# Patient Record
Sex: FEMALE | Race: WHITE | NOT HISPANIC OR LATINO | Employment: FULL TIME | URBAN - NONMETROPOLITAN AREA
[De-identification: names, ages, dates, MRNs, and addresses within clinical notes are randomized per-mention and may not be internally consistent; named-entity substitution may affect disease eponyms.]

---
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
## 2020-01-26 ENCOUNTER — TRANSFERRED RECORDS (OUTPATIENT)
Dept: HEALTH INFORMATION MANAGEMENT | Facility: CLINIC | Age: 20
End: 2020-01-26

## 2020-01-27 ENCOUNTER — OFFICE VISIT (OUTPATIENT)
Dept: OBGYN | Facility: OTHER | Age: 20
End: 2020-01-27
Attending: OBSTETRICS & GYNECOLOGY
Payer: COMMERCIAL

## 2020-01-27 VITALS
BODY MASS INDEX: 35.26 KG/M2 | WEIGHT: 199 LBS | SYSTOLIC BLOOD PRESSURE: 122 MMHG | HEIGHT: 63 IN | DIASTOLIC BLOOD PRESSURE: 70 MMHG | HEART RATE: 75 BPM

## 2020-01-27 DIAGNOSIS — N83.291 COMPLEX CYST OF RIGHT OVARY: Primary | ICD-10-CM

## 2020-01-27 PROCEDURE — G0463 HOSPITAL OUTPT CLINIC VISIT: HCPCS

## 2020-01-27 PROCEDURE — 99212 OFFICE O/P EST SF 10 MIN: CPT | Performed by: OBSTETRICS & GYNECOLOGY

## 2020-01-27 RX ORDER — NORETHINDRONE ACETATE AND ETHINYL ESTRADIOL .03; 1.5 MG/1; MG/1
1 TABLET ORAL DAILY
Qty: 28 TABLET | Refills: 11 | Status: SHIPPED | OUTPATIENT
Start: 2020-01-27 | End: 2020-06-15

## 2020-01-27 RX ORDER — OXYCODONE HYDROCHLORIDE 5 MG/1
5 TABLET ORAL
COMMUNITY
Start: 2020-01-26 | End: 2020-02-05

## 2020-01-27 ASSESSMENT — PAIN SCALES - GENERAL: PAINLEVEL: MODERATE PAIN (5)

## 2020-01-27 ASSESSMENT — MIFFLIN-ST. JEOR: SCORE: 1641.79

## 2020-01-27 NOTE — NURSING NOTE
"Chief Complaint   Patient presents with     Follow Up     ER-ovarian cyst ruptured       Initial /70   Pulse 75   Ht 1.6 m (5' 3\")   Wt 90.3 kg (199 lb)   BMI 35.25 kg/m   Estimated body mass index is 35.25 kg/m  as calculated from the following:    Height as of this encounter: 1.6 m (5' 3\").    Weight as of this encounter: 90.3 kg (199 lb).  Medication Reconciliation: complete  Yael Reyes LPN    "

## 2020-02-05 ENCOUNTER — OFFICE VISIT (OUTPATIENT)
Dept: OBGYN | Facility: OTHER | Age: 20
End: 2020-02-05
Attending: ADVANCED PRACTICE MIDWIFE
Payer: COMMERCIAL

## 2020-02-05 VITALS
BODY MASS INDEX: 35.26 KG/M2 | SYSTOLIC BLOOD PRESSURE: 130 MMHG | HEIGHT: 63 IN | WEIGHT: 199 LBS | DIASTOLIC BLOOD PRESSURE: 78 MMHG

## 2020-02-05 DIAGNOSIS — Z30.46 NEXPLANON REMOVAL: ICD-10-CM

## 2020-02-05 DIAGNOSIS — Z30.46 ENCOUNTER FOR NEXPLANON REMOVAL: Primary | ICD-10-CM

## 2020-02-05 PROCEDURE — G0463 HOSPITAL OUTPT CLINIC VISIT: HCPCS | Mod: 25

## 2020-02-05 PROCEDURE — 11982 REMOVE DRUG IMPLANT DEVICE: CPT | Performed by: ADVANCED PRACTICE MIDWIFE

## 2020-02-05 PROCEDURE — 11982 REMOVE DRUG IMPLANT DEVICE: CPT

## 2020-02-05 ASSESSMENT — MIFFLIN-ST. JEOR: SCORE: 1641.79

## 2020-02-05 ASSESSMENT — PAIN SCALES - GENERAL: PAINLEVEL: SEVERE PAIN (7)

## 2020-02-05 NOTE — PATIENT INSTRUCTIONS
Return to office in one year for well woman care and as needed.    Thank you for allowing Zac IRAHETA CNM and our OB team to participate in your care.  If you have a scheduling or an appointment question please contact [facility] Unit Coordinator at their direct line [phone].   ALL nursing questions or concerns can be directed to your OB nurse at: [phone] Alia Ruiz/Yael

## 2020-02-05 NOTE — PROGRESS NOTES
"Meg Diallo is a 20 year old female  Here today for Nexplanon removal.  Pt seen Dr. Turner OB/GYN for ovarian cysts.  Pt changed to combined oral contraception (PREETI) for birth control and for prevention of cysts.        O:   /78 (BP Location: Left arm, Patient Position: Chair, Cuff Size: Adult Regular)   Ht 1.6 m (5' 3\")   Wt 90.3 kg (199 lb)   BMI 35.25 kg/m     Pleasant without acute distress    Procedure:  Nexplanon removal  After obtaining consent from the patient the nexplanon was removed.  Her arm was prepped copiously with betadine and 1% lidocaine ~ 1 cc was injected into her arm over the nexplanon oriana.  A 0.8 cm incision was made over the previous scar and the nexplanon oriana was removed without difficulty. Pressure was placed over the incision and minimal bleeding was noted.  It was covered with triple antibiotic and 2 x 2 guaze with tegaderm and the arm was bandaged with a kerlex guaze.  She will leave that on for at least 24 hrs.    She tolerated the procedure well.  No complications.     A:    Hx of ovarian cyst  Currently on PREETI  Nexplanon in left arm    P:    Nexplanon removal    Total visit greater than 15 minutes with 10 minutes spent face to face counseling this patient on Nexplanon, explained removal procedure, site care and signs of infection.    MYRTLE [place], ANAT    "

## 2020-02-05 NOTE — NURSING NOTE
"Chief Complaint   Patient presents with     Contraception       Initial /78 (BP Location: Left arm, Patient Position: Chair, Cuff Size: Adult Regular)   Ht 1.6 m (5' 3\")   Wt 90.3 kg (199 lb)   BMI 35.25 kg/m   Estimated body mass index is 35.25 kg/m  as calculated from the following:    Height as of this encounter: 1.6 m (5' 3\").    Weight as of this encounter: 90.3 kg (199 lb).  Medication Reconciliation: complete  Sara Morel LPN    "

## 2020-02-28 ENCOUNTER — TRANSFERRED RECORDS (OUTPATIENT)
Dept: HEALTH INFORMATION MANAGEMENT | Facility: CLINIC | Age: 20
End: 2020-02-28

## 2020-03-03 ENCOUNTER — TRANSFERRED RECORDS (OUTPATIENT)
Dept: HEALTH INFORMATION MANAGEMENT | Facility: CLINIC | Age: 20
End: 2020-03-03

## 2020-03-03 LAB
ALT SERPL-CCNC: 22 IU/L (ref 6–31)
AST SERPL-CCNC: 16 IU/L (ref 10–40)
CREAT SERPL-MCNC: 0.83 MG/DL (ref 0.4–1)
GFR SERPL CREATININE-BSD FRML MDRD: >60 ML/MIN/1.73M2
GLUCOSE SERPL-MCNC: 87 MG/DL (ref 70–99)
POTASSIUM SERPL-SCNC: 4 MEQ/L (ref 3.4–5.1)

## 2020-05-18 ENCOUNTER — TRANSFERRED RECORDS (OUTPATIENT)
Dept: HEALTH INFORMATION MANAGEMENT | Facility: CLINIC | Age: 20
End: 2020-05-18

## 2020-06-03 ENCOUNTER — HOSPITAL ENCOUNTER (OUTPATIENT)
Dept: CT IMAGING | Facility: HOSPITAL | Age: 20
End: 2020-06-03
Attending: NURSE PRACTITIONER
Payer: COMMERCIAL

## 2020-06-03 ENCOUNTER — HOSPITAL ENCOUNTER (OUTPATIENT)
Dept: ULTRASOUND IMAGING | Facility: HOSPITAL | Age: 20
End: 2020-06-03
Attending: NURSE PRACTITIONER
Payer: COMMERCIAL

## 2020-06-03 ENCOUNTER — OFFICE VISIT (OUTPATIENT)
Dept: FAMILY MEDICINE | Facility: OTHER | Age: 20
End: 2020-06-03
Attending: NURSE PRACTITIONER
Payer: COMMERCIAL

## 2020-06-03 VITALS
TEMPERATURE: 97.7 F | WEIGHT: 195 LBS | HEART RATE: 65 BPM | HEIGHT: 63 IN | DIASTOLIC BLOOD PRESSURE: 66 MMHG | OXYGEN SATURATION: 100 % | SYSTOLIC BLOOD PRESSURE: 122 MMHG | BODY MASS INDEX: 34.55 KG/M2

## 2020-06-03 DIAGNOSIS — R10.2 PELVIC PAIN IN FEMALE: ICD-10-CM

## 2020-06-03 DIAGNOSIS — R89.9 ABNORMAL LABORATORY TEST: ICD-10-CM

## 2020-06-03 DIAGNOSIS — R10.84 ABDOMINAL PAIN, GENERALIZED: Primary | ICD-10-CM

## 2020-06-03 LAB
ALBUMIN SERPL-MCNC: 4 G/DL (ref 3.4–5)
ALBUMIN UR-MCNC: 10 MG/DL
ALP SERPL-CCNC: 55 U/L (ref 40–150)
ALT SERPL W P-5'-P-CCNC: 24 U/L (ref 0–50)
ANION GAP SERPL CALCULATED.3IONS-SCNC: 9 MMOL/L (ref 3–14)
APPEARANCE UR: CLEAR
AST SERPL W P-5'-P-CCNC: 15 U/L (ref 0–45)
BACTERIA #/AREA URNS HPF: ABNORMAL /HPF
BASOPHILS # BLD AUTO: 0.1 10E9/L (ref 0–0.2)
BASOPHILS NFR BLD AUTO: 0.6 %
BILIRUB SERPL-MCNC: 0.2 MG/DL (ref 0.2–1.3)
BILIRUB UR QL STRIP: NEGATIVE
BUN SERPL-MCNC: 15 MG/DL (ref 7–30)
CALCIUM SERPL-MCNC: 8.6 MG/DL (ref 8.5–10.1)
CHLORIDE SERPL-SCNC: 107 MMOL/L (ref 94–109)
CO2 SERPL-SCNC: 22 MMOL/L (ref 20–32)
COLOR UR AUTO: YELLOW
CREAT SERPL-MCNC: 0.68 MG/DL (ref 0.52–1.04)
DIFFERENTIAL METHOD BLD: NORMAL
EOSINOPHIL # BLD AUTO: 0.6 10E9/L (ref 0–0.7)
EOSINOPHIL NFR BLD AUTO: 6.9 %
ERYTHROCYTE [DISTWIDTH] IN BLOOD BY AUTOMATED COUNT: 12.8 % (ref 10–15)
GFR SERPL CREATININE-BSD FRML MDRD: >90 ML/MIN/{1.73_M2}
GLUCOSE SERPL-MCNC: 93 MG/DL (ref 70–99)
GLUCOSE UR STRIP-MCNC: NEGATIVE MG/DL
HCG UR QL: NEGATIVE
HCT VFR BLD AUTO: 40 % (ref 35–47)
HGB BLD-MCNC: 13.7 G/DL (ref 11.7–15.7)
HGB UR QL STRIP: NEGATIVE
IMM GRANULOCYTES # BLD: 0 10E9/L (ref 0–0.4)
IMM GRANULOCYTES NFR BLD: 0.1 %
KETONES UR STRIP-MCNC: NEGATIVE MG/DL
LEUKOCYTE ESTERASE UR QL STRIP: ABNORMAL
LYMPHOCYTES # BLD AUTO: 2.7 10E9/L (ref 0.8–5.3)
LYMPHOCYTES NFR BLD AUTO: 32.2 %
MCH RBC QN AUTO: 28.7 PG (ref 26.5–33)
MCHC RBC AUTO-ENTMCNC: 34.3 G/DL (ref 31.5–36.5)
MCV RBC AUTO: 84 FL (ref 78–100)
MONOCYTES # BLD AUTO: 0.8 10E9/L (ref 0–1.3)
MONOCYTES NFR BLD AUTO: 9.8 %
MUCOUS THREADS #/AREA URNS LPF: PRESENT /LPF
NEUTROPHILS # BLD AUTO: 4.2 10E9/L (ref 1.6–8.3)
NEUTROPHILS NFR BLD AUTO: 50.4 %
NITRATE UR QL: NEGATIVE
NRBC # BLD AUTO: 0 10*3/UL
NRBC BLD AUTO-RTO: 0 /100
PH UR STRIP: 6 PH (ref 4.7–8)
PLATELET # BLD AUTO: 273 10E9/L (ref 150–450)
POTASSIUM SERPL-SCNC: 3.6 MMOL/L (ref 3.4–5.3)
PROT SERPL-MCNC: 8.4 G/DL (ref 6.8–8.8)
RBC # BLD AUTO: 4.77 10E12/L (ref 3.8–5.2)
RBC #/AREA URNS AUTO: 2 /HPF (ref 0–2)
SODIUM SERPL-SCNC: 138 MMOL/L (ref 133–144)
SOURCE: ABNORMAL
SP GR UR STRIP: 1.03 (ref 1–1.03)
SQUAMOUS #/AREA URNS AUTO: 11 /HPF (ref 0–1)
UROBILINOGEN UR STRIP-MCNC: NORMAL MG/DL (ref 0–2)
WBC # BLD AUTO: 8.3 10E9/L (ref 4–11)
WBC #/AREA URNS AUTO: 6 /HPF (ref 0–5)

## 2020-06-03 PROCEDURE — 36415 COLL VENOUS BLD VENIPUNCTURE: CPT | Mod: ZL | Performed by: NURSE PRACTITIONER

## 2020-06-03 PROCEDURE — 85025 COMPLETE CBC W/AUTO DIFF WBC: CPT | Mod: ZL | Performed by: NURSE PRACTITIONER

## 2020-06-03 PROCEDURE — 80053 COMPREHEN METABOLIC PANEL: CPT | Mod: ZL | Performed by: NURSE PRACTITIONER

## 2020-06-03 PROCEDURE — G0463 HOSPITAL OUTPT CLINIC VISIT: HCPCS

## 2020-06-03 PROCEDURE — 87491 CHLMYD TRACH DNA AMP PROBE: CPT | Mod: ZL | Performed by: NURSE PRACTITIONER

## 2020-06-03 PROCEDURE — 99215 OFFICE O/P EST HI 40 MIN: CPT | Performed by: NURSE PRACTITIONER

## 2020-06-03 PROCEDURE — 81025 URINE PREGNANCY TEST: CPT | Mod: ZL | Performed by: NURSE PRACTITIONER

## 2020-06-03 PROCEDURE — 74177 CT ABD & PELVIS W/CONTRAST: CPT | Mod: TC

## 2020-06-03 PROCEDURE — 81001 URINALYSIS AUTO W/SCOPE: CPT | Mod: ZL | Performed by: NURSE PRACTITIONER

## 2020-06-03 PROCEDURE — 87591 N.GONORRHOEAE DNA AMP PROB: CPT | Mod: ZL | Performed by: NURSE PRACTITIONER

## 2020-06-03 PROCEDURE — G0463 HOSPITAL OUTPT CLINIC VISIT: HCPCS | Mod: 25

## 2020-06-03 PROCEDURE — 76856 US EXAM PELVIC COMPLETE: CPT | Mod: TC

## 2020-06-03 PROCEDURE — 25500064 ZZH RX 255 OP 636: Performed by: RADIOLOGY

## 2020-06-03 RX ORDER — IOPAMIDOL 612 MG/ML
100 INJECTION, SOLUTION INTRAVASCULAR ONCE
Status: COMPLETED | OUTPATIENT
Start: 2020-06-03 | End: 2020-06-03

## 2020-06-03 RX ORDER — NITROFURANTOIN 25; 75 MG/1; MG/1
100 CAPSULE ORAL 2 TIMES DAILY
Qty: 14 CAPSULE | Refills: 0 | Status: SHIPPED | OUTPATIENT
Start: 2020-06-03 | End: 2020-06-15

## 2020-06-03 RX ADMIN — IOPAMIDOL 100 ML: 612 INJECTION, SOLUTION INTRAVENOUS at 12:01

## 2020-06-03 RX ADMIN — DIATRIZOATE MEGLUMINE AND DIATRIZOATE SODIUM 30 ML: 660; 100 SOLUTION ORAL; RECTAL at 12:01

## 2020-06-03 ASSESSMENT — ANXIETY QUESTIONNAIRES
GAD7 TOTAL SCORE: 18
6. BECOMING EASILY ANNOYED OR IRRITABLE: MORE THAN HALF THE DAYS
4. TROUBLE RELAXING: MORE THAN HALF THE DAYS
1. FEELING NERVOUS, ANXIOUS, OR ON EDGE: NEARLY EVERY DAY
2. NOT BEING ABLE TO STOP OR CONTROL WORRYING: NEARLY EVERY DAY
7. FEELING AFRAID AS IF SOMETHING AWFUL MIGHT HAPPEN: NEARLY EVERY DAY
IF YOU CHECKED OFF ANY PROBLEMS ON THIS QUESTIONNAIRE, HOW DIFFICULT HAVE THESE PROBLEMS MADE IT FOR YOU TO DO YOUR WORK, TAKE CARE OF THINGS AT HOME, OR GET ALONG WITH OTHER PEOPLE: VERY DIFFICULT
3. WORRYING TOO MUCH ABOUT DIFFERENT THINGS: NEARLY EVERY DAY
5. BEING SO RESTLESS THAT IT IS HARD TO SIT STILL: MORE THAN HALF THE DAYS

## 2020-06-03 ASSESSMENT — ASTHMA QUESTIONNAIRES
QUESTION_5 LAST FOUR WEEKS HOW WOULD YOU RATE YOUR ASTHMA CONTROL: WELL CONTROLLED
QUESTION_3 LAST FOUR WEEKS HOW OFTEN DID YOUR ASTHMA SYMPTOMS (WHEEZING, COUGHING, SHORTNESS OF BREATH, CHEST TIGHTNESS OR PAIN) WAKE YOU UP AT NIGHT OR EARLIER THAN USUAL IN THE MORNING: NOT AT ALL
ACT_TOTALSCORE: 23
QUESTION_4 LAST FOUR WEEKS HOW OFTEN HAVE YOU USED YOUR RESCUE INHALER OR NEBULIZER MEDICATION (SUCH AS ALBUTEROL): NOT AT ALL
QUESTION_2 LAST FOUR WEEKS HOW OFTEN HAVE YOU HAD SHORTNESS OF BREATH: ONCE OR TWICE A WEEK
QUESTION_1 LAST FOUR WEEKS HOW MUCH OF THE TIME DID YOUR ASTHMA KEEP YOU FROM GETTING AS MUCH DONE AT WORK, SCHOOL OR AT HOME: NONE OF THE TIME

## 2020-06-03 ASSESSMENT — MIFFLIN-ST. JEOR: SCORE: 1623.64

## 2020-06-03 ASSESSMENT — PATIENT HEALTH QUESTIONNAIRE - PHQ9: SUM OF ALL RESPONSES TO PHQ QUESTIONS 1-9: 14

## 2020-06-03 ASSESSMENT — PAIN SCALES - GENERAL: PAINLEVEL: SEVERE PAIN (6)

## 2020-06-03 NOTE — PROGRESS NOTES
"Subjective     Meg Diallo is a 20 year old female who presents to clinic today for the following health issues:    HPI   Abdominal Pain    Duration: Initially Meg noticed blood in the urine 2 weeks ago and went to [facility] urgent care (no records available) about 9 days ago with pelvic pain and vaginal area pain with swelling. The blood in her urine had resolved by this time.   She reports that she was diagnosed with yeast infection and given a prescription to two medications. Pain is intermittent and sharp, almost pulsating feeling. \"I have a difficult time being able to sleep due to the pain\". She denies having any imagining.     Description (location/character/radiation): left side and radiates to right side at times. Seems to migrate.        Associated flank pain: None    Intensity:  moderate    Accompanying signs and symptoms:        Fever/Chills: no        Gas/Bloating: no        Nausea/vomitting: YES- nausea with food; especially water. I have not been eating as much.        Diarrhea: no        Dysuria or Hematuria: no     History (previous similar pain/trauma/previous testing): similar pain with cyst on ovary. Denies hx of ectopic pregnancy.  Believes they did a pregnancy test at [facility] and that it was negative. Is on oral birth control for ovarian cyst.     Precipitating or alleviating factors:       Pain worse with eating/BM/urination: yes       Pain relieved by BM: no     Therapies tried and outcome: tylenol and ibuprofen     LMP:  Unsure exactly. However, believes it last started around April 26th, 2019. Denies intercourse in past 3 months.       Patient Active Problem List   Diagnosis     Tinnitus     Complex cyst of right ovary--2.5cm-Ruptured,seen ER--1/2020     Major depressive disorder, recurrent episode, moderate (H)     H/O methicillin resistant Staphylococcus aureus infection     Anxiety state     Bipolar disorder, in full remission, most recent episode depressed (H)     " Mild intellectual disabilities     Encounter for initial prescription of implantable subdermal contraceptive     Pain in joint, ankle and foot, unspecified laterality     Complex cyst of LEFT OVARY 5cm on US---8/2019, Laparoscopy drainage--8/2019     Left lower quadrant pain--US 5cm Complex LEFT OVARIAN CYST---8/2019, Laparoscopy drainage-SIMPLE CYST---8/2019     S/P LAPAROSCOPY w Drainage LEFT OVARIAN CYST 5cm---8/22/2019     Nexplanon removal     Past Surgical History:   Procedure Laterality Date     HEAD & NECK SURGERY      laser on face     LAPAROSCOPIC CYSTECTOMY OVARIAN (ONCOLOGY) Left 8/22/2019    Procedure: LAPAROSCOPY TREAT LEFT OVARIAN CYST, MULTIPLE ASPIRATIONS LEFT OVARIAN CYST;  Surgeon: Frow, David Franke, MD;  Location: [facility] OR     [facility] [place] surgery birthmark to left side of face         Social History     Tobacco Use     Smoking status: Never Smoker     Smokeless tobacco: Never Used   Substance Use Topics     Alcohol use: No     Family History   Problem Relation Age of Onset     Bipolar Disorder Mother      Depression Mother      Heart Disease Father      Diabetes Other      Myocardial Infarction Paternal Grandfather      Lung Cancer Paternal Grandfather      Other - See Comments Paternal Aunt         mulitiple sclerosis     Breast Cancer Maternal Grandfather         x 2         Current Outpatient Medications   Medication Sig Dispense Refill     albuterol (PROAIR HFA/PROVENTIL HFA/VENTOLIN HFA) 108 (90 Base) MCG/ACT inhaler Inhale 2 puffs into the lungs every 6 hours as needed for shortness of breath / dyspnea or wheezing 1 Inhaler 3     budesonide-formoterol (SYMBICORT) 80-4.5 MCG/ACT Inhaler Inhale 2 puffs into the lungs 2 times daily 3 Inhaler 1     nitroFURantoin macrocrystal-monohydrate (MACROBID) 100 MG capsule Take 1 capsule (100 mg) by mouth 2 times daily for 7 days 14 capsule 0     norethindrone-ethinyl estradiol (MICROGESTIN 1.5/30) 1.5-30 MG-MCG tablet Take 1 tablet by mouth daily Take at same  "time every day 28 tablet 11     Allergies   Allergen Reactions     Lexapro [Escitalopram] Other (See Comments)     hypersensitivity to touch      Recent Labs   Lab Test 06/03/20  1047 03/03/20 08/15/19  05/08/18  1027 06/22/17  2357  11/20/16  1146   ALT 24 22 13  --   --   --    < > 21   CR 0.68 0.83 0.76   < >  --  0.41*   < > 0.64   GFRESTIMATED >90 >60 >60  --   --  >90  Non  GFR Calc     < > >90  Non  GFR Calc     GFRESTBLACK >90  --   --   --   --  >90  African American GFR Calc     < > >90   GFR Calc     POTASSIUM 3.6 4.0 3.6   < >  --  3.8   < > 3.5   TSH  --   --   --   --  2.26  --   --  1.38    < > = values in this interval not displayed.      BP Readings from Last 3 Encounters:   06/03/20 122/66   02/05/20 130/78   01/27/20 122/70    Wt Readings from Last 3 Encounters:   06/03/20 88.5 kg (195 lb)   02/05/20 90.3 kg (199 lb)   01/27/20 90.3 kg (199 lb)              Reviewed and updated as needed this visit by Provider         Review of Systems   CONSTITUTIONAL: NEGATIVE for fever, chills, change in weight  INTEGUMENTARY/SKIN: NEGATIVE for worrisome rashes, moles or lesions  EYES: NEGATIVE for vision changes or irritation  ENT/MOUTH: NEGATIVE for ear, mouth and throat problems  RESP: NEGATIVE for significant cough or SOB  CV: NEGATIVE for chest pain, palpitations or peripheral edema  GI: As in HPI   female: As in HPI  MUSCULOSKELETAL: NEGATIVE for significant arthralgias or myalgia  NEURO: NEGATIVE for weakness, dizziness or paresthesias  ENDOCRINE: NEGATIVE for temperature intolerance, skin/hair changes  HEME/ALLERGY/IMMUNE: As in HPI  PSYCHIATRIC: fatigue      Objective    /66   Pulse 65   Temp 97.7  F (36.5  C)   Ht 1.6 m (5' 3\")   Wt 88.5 kg (195 lb)   LMP 05/03/2020 (Approximate)   SpO2 100%   BMI 34.54 kg/m    Body mass index is 34.54 kg/m .     Results for orders placed or performed in visit on 06/03/20   CT Abdomen Pelvis w Contrast     " Status: None    Narrative    EXAMINATION: CT ABDOMEN PELVIS W CONTRAST, 6/3/2020 12:14 PM    TECHNIQUE:  Helical CT images from the lung bases through the  symphysis pubis were obtained  with IV contrast. Contrast dose: Isovue  300, 100ml    COMPARISON: none    HISTORY: Nausea, vomiting; Abd pain, acute, generalized; Abdominal  pain, generalized; Pelvic pain in female    FINDINGS:    There is dependent atelectasis at the lung bases.    The liver is free of masses or biliary ductal enlargement. No  calcified gallstones are seen.    The the spleen and pancreas appear normal.    The adrenal glands are normal.    The right and left kidneys are free of masses or hydronephrosis.    The periaortic lymph nodes are normal in caliber.    No intraperitoneal masses or inflammatory changes are noted. The  appendix was visualized and appears normal.    In the pelvis the bladder and rectum appear normal. There is a 4 cm  low-density mass in the left ovary possibly a cyst ultrasound  follow-up is recommended. There appears to be a recently ruptured  follicle in the right ovary with a small amount of free fluid in the  pelvic cul-de-sac    The regional skeleton is intact      Impression    IMPRESSION: 4 cm diameter low-density mass in the left ovary possibly  a benign cyst. Ultrasound follow-up is recommended     CAROLYNE ZUNIGA MD   US Pelvic Complete w Transvaginal & Abd/Pel Duplex Limited     Status: None    Narrative    PROCEDURE: US PELVIS COMPLETE W TRANSVAGINAL AND DOPPLER LIMITED  6/3/2020 1:44 PM    HISTORY: worsening pelvic pain. 4cm left ovarian cyst. Rule out  torsion.; Pelvic pain in female    COMPARISONS: None.    TECHNIQUE: Ultrasound the pelvis    FINDINGS: The uterus measures 8 x 3.3 x 5.3 cm. The endometrium  measures 5 mm in diameter. There are no masses. The right ovary  measures 2.3 x 3.3 x 2.1 cm. The left ovary measures 3.7 x 4.7 x 3.6  cm. There is a mass with internal echoes in the left ovary measuring  4  cm in maximal diameter. Both benign or malignant ovarian mass could  produce this appearance. A recently ruptured follicle is seen in the  right ovary. Arterial venous flow is identified in both ovaries There  is free fluid in the pelvic cul-de-sac.         Impression    IMPRESSION: 4 cm mass with internal echoes are seen in the left ovary.  Both benign or malignant ovarian mass could produce this appearance.    Recently ruptured right ovarian follicle with free fluid in the  cul-de-sac    CAROLYNE ZUNIGA MD   HCG qualitative urine     Status: None   Result Value Ref Range    HCG Qual Urine Negative NEG^Negative   CBC with platelets and differential     Status: None   Result Value Ref Range    WBC 8.3 4.0 - 11.0 10e9/L    RBC Count 4.77 3.8 - 5.2 10e12/L    Hemoglobin 13.7 11.7 - 15.7 g/dL    Hematocrit 40.0 35.0 - 47.0 %    MCV 84 78 - 100 fl    MCH 28.7 26.5 - 33.0 pg    MCHC 34.3 31.5 - 36.5 g/dL    RDW 12.8 10.0 - 15.0 %    Platelet Count 273 150 - 450 10e9/L    Diff Method Automated Method     % Neutrophils 50.4 %    % Lymphocytes 32.2 %    % Monocytes 9.8 %    % Eosinophils 6.9 %    % Basophils 0.6 %    % Immature Granulocytes 0.1 %    Nucleated RBCs 0 0 /100    Absolute Neutrophil 4.2 1.6 - 8.3 10e9/L    Absolute Lymphocytes 2.7 0.8 - 5.3 10e9/L    Absolute Monocytes 0.8 0.0 - 1.3 10e9/L    Absolute Eosinophils 0.6 0.0 - 0.7 10e9/L    Absolute Basophils 0.1 0.0 - 0.2 10e9/L    Abs Immature Granulocytes 0.0 0 - 0.4 10e9/L    Absolute Nucleated RBC 0.0    Comprehensive metabolic panel (BMP + Alb, Alk Phos, ALT, AST, Total. Bili, TP)     Status: None   Result Value Ref Range    Sodium 138 133 - 144 mmol/L    Potassium 3.6 3.4 - 5.3 mmol/L    Chloride 107 94 - 109 mmol/L    Carbon Dioxide 22 20 - 32 mmol/L    Anion Gap 9 3 - 14 mmol/L    Glucose 93 70 - 99 mg/dL    Urea Nitrogen 15 7 - 30 mg/dL    Creatinine 0.68 0.52 - 1.04 mg/dL    GFR Estimate >90 >60 mL/min/[1.73_m2]    GFR Estimate If Black >90 >60  mL/min/[1.73_m2]    Calcium 8.6 8.5 - 10.1 mg/dL    Bilirubin Total 0.2 0.2 - 1.3 mg/dL    Albumin 4.0 3.4 - 5.0 g/dL    Protein Total 8.4 6.8 - 8.8 g/dL    Alkaline Phosphatase 55 40 - 150 U/L    ALT 24 0 - 50 U/L    AST 15 0 - 45 U/L   UA reflex to Microscopic and Culture - [place]     Status: Abnormal    Specimen: Midstream Urine   Result Value Ref Range    Color Urine Yellow     Appearance Urine Clear     Glucose Urine Negative NEG^Negative mg/dL    Bilirubin Urine Negative NEG^Negative    Ketones Urine Negative NEG^Negative mg/dL    Specific Gravity Urine 1.035 1.003 - 1.035    Blood Urine Negative NEG^Negative    pH Urine 6.0 4.7 - 8.0 pH    Protein Albumin Urine 10 (A) NEG^Negative mg/dL    Urobilinogen mg/dL Normal 0.0 - 2.0 mg/dL    Nitrite Urine Negative NEG^Negative    Leukocyte Esterase Urine Small (A) NEG^Negative    Source Midstream Urine     RBC Urine 2 0 - 2 /HPF    WBC Urine 6 (H) 0 - 5 /HPF    Bacteria Urine None (A) NEG^Negative /HPF    Squamous Epithelial /HPF Urine 11 (H) 0 - 1 /HPF    Mucous Urine Present (A) NEG^Negative /LPF   GC/Chlamydia by PCR - [facility]     Status: None    Specimen: Urine   Result Value Ref Range    Specimen Source Urine     Neisseria gonorrhoreae PCR Not Detected NDET^Not Detected    Chlamydia Trachomatis PCR Not Detected NDET^Not Detected         Physical Exam     GENERAL: Alert. Initial impression was that this was a generally healthy individual who was well appearing.   EYES: Eyes grossly normal to inspection, PERRL and conjunctivae and sclerae normal  RESP: lungs clear to auscultation - no rales, rhonchi or wheezes  CV: regular rate and rhythm, normal S1 S2, no S3 or S4, no murmur, click or rub, no peripheral edema and peripheral pulses strong  ABDOMEN: Overall generalized, moderate tenderness to abdomen and pelvic region most specifically periumbilical, LLQ and left pelvic, but also at McBurney's point. Positive obturator test and heel drop test. The left pelvic region  and LLQ pain is slightly more tender than other areas.  Mild guarding noted with palpation.  No organomegaly or masses but this aspect of the exam is somewhat limited by tenderness. Bowel sounds normal. No bruits heard  MS: extremities normal- no gross deformities noted  SKIN: no suspicious lesions or rashes  NEURO: Normal strength and tone, mentation intact and speech normal  PSYCH: mentation appears normal. initially presented with a flat affect and anxious during discussions of her health. Judgement and insight intact    Results for orders placed or performed in visit on 06/03/20   CT Abdomen Pelvis w Contrast     Status: None    Narrative    EXAMINATION: CT ABDOMEN PELVIS W CONTRAST, 6/3/2020 12:14 PM    TECHNIQUE:  Helical CT images from the lung bases through the  symphysis pubis were obtained  with IV contrast. Contrast dose: Isovue  300, 100ml    COMPARISON: none    HISTORY: Nausea, vomiting; Abd pain, acute, generalized; Abdominal  pain, generalized; Pelvic pain in female    FINDINGS:    There is dependent atelectasis at the lung bases.    The liver is free of masses or biliary ductal enlargement. No  calcified gallstones are seen.    The the spleen and pancreas appear normal.    The adrenal glands are normal.    The right and left kidneys are free of masses or hydronephrosis.    The periaortic lymph nodes are normal in caliber.    No intraperitoneal masses or inflammatory changes are noted. The  appendix was visualized and appears normal.    In the pelvis the bladder and rectum appear normal. There is a 4 cm  low-density mass in the left ovary possibly a cyst ultrasound  follow-up is recommended. There appears to be a recently ruptured  follicle in the right ovary with a small amount of free fluid in the  pelvic cul-de-sac    The regional skeleton is intact      Impression    IMPRESSION: 4 cm diameter low-density mass in the left ovary possibly  a benign cyst. Ultrasound follow-up is recommended     CAROLYNE  MD NADIA   US Pelvic Complete w Transvaginal & Abd/Pel Duplex Limited     Status: None    Narrative    PROCEDURE: US PELVIS COMPLETE W TRANSVAGINAL AND DOPPLER LIMITED  6/3/2020 1:44 PM    HISTORY: worsening pelvic pain. 4cm left ovarian cyst. Rule out  torsion.; Pelvic pain in female    COMPARISONS: None.    TECHNIQUE: Ultrasound the pelvis    FINDINGS: The uterus measures 8 x 3.3 x 5.3 cm. The endometrium  measures 5 mm in diameter. There are no masses. The right ovary  measures 2.3 x 3.3 x 2.1 cm. The left ovary measures 3.7 x 4.7 x 3.6  cm. There is a mass with internal echoes in the left ovary measuring 4  cm in maximal diameter. Both benign or malignant ovarian mass could  produce this appearance. A recently ruptured follicle is seen in the  right ovary. Arterial venous flow is identified in both ovaries There  is free fluid in the pelvic cul-de-sac.         Impression    IMPRESSION: 4 cm mass with internal echoes are seen in the left ovary.  Both benign or malignant ovarian mass could produce this appearance.    Recently ruptured right ovarian follicle with free fluid in the  cul-de-sac    CAROLYNE ZUNIGA MD   HCG qualitative urine     Status: None   Result Value Ref Range    HCG Qual Urine Negative NEG^Negative   CBC with platelets and differential     Status: None   Result Value Ref Range    WBC 8.3 4.0 - 11.0 10e9/L    RBC Count 4.77 3.8 - 5.2 10e12/L    Hemoglobin 13.7 11.7 - 15.7 g/dL    Hematocrit 40.0 35.0 - 47.0 %    MCV 84 78 - 100 fl    MCH 28.7 26.5 - 33.0 pg    MCHC 34.3 31.5 - 36.5 g/dL    RDW 12.8 10.0 - 15.0 %    Platelet Count 273 150 - 450 10e9/L    Diff Method Automated Method     % Neutrophils 50.4 %    % Lymphocytes 32.2 %    % Monocytes 9.8 %    % Eosinophils 6.9 %    % Basophils 0.6 %    % Immature Granulocytes 0.1 %    Nucleated RBCs 0 0 /100    Absolute Neutrophil 4.2 1.6 - 8.3 10e9/L    Absolute Lymphocytes 2.7 0.8 - 5.3 10e9/L    Absolute Monocytes 0.8 0.0 - 1.3 10e9/L     Absolute Eosinophils 0.6 0.0 - 0.7 10e9/L    Absolute Basophils 0.1 0.0 - 0.2 10e9/L    Abs Immature Granulocytes 0.0 0 - 0.4 10e9/L    Absolute Nucleated RBC 0.0    Comprehensive metabolic panel (BMP + Alb, Alk Phos, ALT, AST, Total. Bili, TP)     Status: None   Result Value Ref Range    Sodium 138 133 - 144 mmol/L    Potassium 3.6 3.4 - 5.3 mmol/L    Chloride 107 94 - 109 mmol/L    Carbon Dioxide 22 20 - 32 mmol/L    Anion Gap 9 3 - 14 mmol/L    Glucose 93 70 - 99 mg/dL    Urea Nitrogen 15 7 - 30 mg/dL    Creatinine 0.68 0.52 - 1.04 mg/dL    GFR Estimate >90 >60 mL/min/[1.73_m2]    GFR Estimate If Black >90 >60 mL/min/[1.73_m2]    Calcium 8.6 8.5 - 10.1 mg/dL    Bilirubin Total 0.2 0.2 - 1.3 mg/dL    Albumin 4.0 3.4 - 5.0 g/dL    Protein Total 8.4 6.8 - 8.8 g/dL    Alkaline Phosphatase 55 40 - 150 U/L    ALT 24 0 - 50 U/L    AST 15 0 - 45 U/L   UA reflex to Microscopic and Culture - [place]     Status: Abnormal    Specimen: Midstream Urine   Result Value Ref Range    Color Urine Yellow     Appearance Urine Clear     Glucose Urine Negative NEG^Negative mg/dL    Bilirubin Urine Negative NEG^Negative    Ketones Urine Negative NEG^Negative mg/dL    Specific Gravity Urine 1.035 1.003 - 1.035    Blood Urine Negative NEG^Negative    pH Urine 6.0 4.7 - 8.0 pH    Protein Albumin Urine 10 (A) NEG^Negative mg/dL    Urobilinogen mg/dL Normal 0.0 - 2.0 mg/dL    Nitrite Urine Negative NEG^Negative    Leukocyte Esterase Urine Small (A) NEG^Negative    Source Midstream Urine     RBC Urine 2 0 - 2 /HPF    WBC Urine 6 (H) 0 - 5 /HPF    Bacteria Urine None (A) NEG^Negative /HPF    Squamous Epithelial /HPF Urine 11 (H) 0 - 1 /HPF    Mucous Urine Present (A) NEG^Negative /LPF   GC/Chlamydia by PCR - [facility]     Status: None    Specimen: Urine   Result Value Ref Range    Specimen Source Urine     Neisseria gonorrhoreae PCR Not Detected NDET^Not Detected    Chlamydia Trachomatis PCR Not Detected NDET^Not Detected             Assessment &  Plan    Initially, Meg presented with a history of chronic episodic pain related to her ovarian cysts. During the HPI interview and ROS, she was well appearing but perhaps slightly anxious and withdrawn. However, exam necessitated broadening the differential diagnosis. The most likely diagnosis was another episode of an ovarian cyst and considered a US for this. However, with the generalized nature of the abd pain, I opted for CT after lengthy discussion of risks/benefits/costs of imagine options (CT vs US). We also discussed option of ED visit and ultimately through shared decision making, Meg opted to do the labs and imaging through the clinic setting for this subacute episode.  An US was ultimately ordered based on CT results and recommendation by the on-call OB/GYN to rule out ovarian torsion. After labs and imaging, appendicitis is less likely and I have discussed referral to OB/GYN for follow-up. I have recommended to her that she see the next available appointment for this regardless of provider. She is opting to wait to see Dr. Turner, who she has seen in the past. I have advised Meg to go to the ED (not urgent care) if the pain changes/worsens, nausea increases, if she has any new symptoms including fever, not able to tolerate food, vomiting, change in bowel or bladder. She is in agreement and thanks me for this visit.    1. Abdominal pain, generalized  - HCG qualitative urine  - CBC with platelets and differential  - Comprehensive metabolic panel (BMP + Alb, Alk Phos, ALT, AST, Total. Bili, TP)  - UA reflex to Microscopic and Culture - [place]  - CT Abdomen Pelvis w Contrast    2. Pelvic pain in female  - HCG qualitative urine  - CBC with platelets and differential  - Comprehensive metabolic panel (BMP + Alb, Alk Phos, ALT, AST, Total. Bili, TP)  - UA reflex to Microscopic and Culture - [place]  - CT Abdomen Pelvis w Contrast  - nitroFURantoin macrocrystal-monohydrate (MACROBID) 100 MG capsule;  Take 1 capsule (100 mg) by mouth 2 times daily for 7 days  Dispense: 14 capsule; Refill: 0  - US Pelvic Complete w Transvaginal & Abd/Pel Duplex Limited  - GC/Chlamydia by PCR - [facility]    3. Abnormal laboratory test  - nitroFURantoin macrocrystal-monohydrate (MACROBID) 100 MG capsule; Take 1 capsule (100 mg) by mouth 2 times daily for 7 days  Dispense: 14 capsule; Refill: 0        Return if symptoms worsen or fail to improve.    Alisha Santoro, CNP  [facility] - [place]

## 2020-06-03 NOTE — NURSING NOTE
"Chief Complaint   Patient presents with     Abdominal Pain       Initial /66   Pulse 65   Temp 97.7  F (36.5  C)   Ht 1.6 m (5' 3\")   Wt 88.5 kg (195 lb)   LMP 05/03/2020 (Approximate)   SpO2 100%   BMI 34.54 kg/m   Estimated body mass index is 34.54 kg/m  as calculated from the following:    Height as of this encounter: 1.6 m (5' 3\").    Weight as of this encounter: 88.5 kg (195 lb).  Medication Reconciliation: complete  Law Enrique LPN  "

## 2020-06-03 NOTE — PATIENT INSTRUCTIONS
Make appointment with Dr. Turner for later this week or next week.   If Ultrasound is normal, you can go home and start taking the antibiotic. I am treating you for a UTI and also checking for STIs (gonnorhea and chlamydia).

## 2020-06-04 LAB
C TRACH DNA SPEC QL NAA+PROBE: NOT DETECTED
N GONORRHOEA DNA SPEC QL NAA+PROBE: NOT DETECTED
SPECIMEN SOURCE: NORMAL

## 2020-06-04 ASSESSMENT — ASTHMA QUESTIONNAIRES: ACT_TOTALSCORE: 23

## 2020-06-04 ASSESSMENT — ANXIETY QUESTIONNAIRES: GAD7 TOTAL SCORE: 18

## 2020-06-15 ENCOUNTER — OFFICE VISIT (OUTPATIENT)
Dept: OBGYN | Facility: OTHER | Age: 20
End: 2020-06-15
Attending: OBSTETRICS & GYNECOLOGY
Payer: COMMERCIAL

## 2020-06-15 VITALS
HEART RATE: 69 BPM | HEIGHT: 63 IN | OXYGEN SATURATION: 99 % | WEIGHT: 195 LBS | SYSTOLIC BLOOD PRESSURE: 122 MMHG | DIASTOLIC BLOOD PRESSURE: 76 MMHG | BODY MASS INDEX: 34.55 KG/M2

## 2020-06-15 DIAGNOSIS — Z98.890 S/P LAPAROSCOPY: ICD-10-CM

## 2020-06-15 DIAGNOSIS — N83.202 CYST OF LEFT OVARY: Primary | ICD-10-CM

## 2020-06-15 DIAGNOSIS — K58.2 IRRITABLE BOWEL SYNDROME WITH BOTH CONSTIPATION AND DIARRHEA: ICD-10-CM

## 2020-06-15 PROCEDURE — G0463 HOSPITAL OUTPT CLINIC VISIT: HCPCS | Performed by: COUNSELOR

## 2020-06-15 PROCEDURE — 99213 OFFICE O/P EST LOW 20 MIN: CPT | Performed by: OBSTETRICS & GYNECOLOGY

## 2020-06-15 PROCEDURE — G0463 HOSPITAL OUTPT CLINIC VISIT: HCPCS | Mod: 25 | Performed by: COUNSELOR

## 2020-06-15 RX ORDER — DICYCLOMINE HYDROCHLORIDE 10 MG/1
10 CAPSULE ORAL
Qty: 120 CAPSULE | Refills: 4 | Status: SHIPPED | OUTPATIENT
Start: 2020-06-15 | End: 2020-07-27

## 2020-06-15 RX ORDER — ACETAMINOPHEN AND CODEINE PHOSPHATE 120; 12 MG/5ML; MG/5ML
0.35 SOLUTION ORAL 2 TIMES DAILY WITH MEALS
Qty: 56 TABLET | Refills: 11 | Status: SHIPPED | OUTPATIENT
Start: 2020-06-15 | End: 2021-01-26

## 2020-06-15 ASSESSMENT — MIFFLIN-ST. JEOR: SCORE: 1623.64

## 2020-06-15 ASSESSMENT — PAIN SCALES - GENERAL: PAINLEVEL: NO PAIN (0)

## 2020-06-15 NOTE — PROGRESS NOTES
S:   Having episodic RLQ pain at times.  Also, has episodic watery diarrhea with alternating constipation.    Had recent CT and US which showed ruptured right ovary cyst and 4cm left ovary cyst with internal echos.  She denies any LLQ pain at this time.  Had been on Loestrin 1.5/30 for over 6 months and keeps getting cyst of ovaries.    Previous laparoscopy done 8/2019 with benign pelvis and drainage of LEFT ovarian cyst.      O:   CT and US shows 4cm LEFT ovary cyst with internal echos.    EXAM:   Tender right colon, left colon is OK.  Rest of exam is normal. Pelvic is not done as imaging done recently.      A:   4cm LEFT [place] Cyst with internal echos.  IBS with constipation and diarrhea      P:   IBS diet changes, Bentyl        Change OCPs to Progesterone only OCP twice daily for better Ovarian supression.  Offered scope, but she declines at this time.    See primary care about IBS if necessary.    Needs US follow-up in 3 months.

## 2020-06-20 ENCOUNTER — TELEPHONE (OUTPATIENT)
Dept: OBGYN | Facility: OTHER | Age: 20
End: 2020-06-20

## 2020-06-20 NOTE — TELEPHONE ENCOUNTER
Received a PA from [facility] for Norethindrone 0.35 mg tablets. Submitted on CMM. Waiting for a response.

## 2020-06-22 NOTE — TELEPHONE ENCOUNTER
Received an APPROVAL from [facility] for Norethindrone 0.35 mg tablets. Effective dates 03/20/2020 to 06/20/2021. Forms scanned to [facility].

## 2020-06-26 NOTE — PROGRESS NOTES
"Subjective     Meg Diallo is a 20 year old female who presents to clinic today for the following health issues:    HPI - From patient report.   Bowel Issues      Duration: Many years    Description (location/character/radiation): Abdominal pain, is at times is severe LUQ, RLQ    Intensity:  moderate    Accompanying signs and symptoms: Bloating with surgar, salt, meat, almost everything    History (similar episodes/previous evaluation): None    Precipitating or alleviating factors: None    Therapies tried and outcome: Bentyl- reaction to med: Burn hands and she discontinued this medication.      Hx Stools: Usually has constipation for a few days and then it goes oily and loose at times. Constipation can last about 2 weeks where she reports \"tiny round stools\", with straining present, and a feeling of incomplete emptying. She denies blood or black/tar-like stools.    Hx: of 2 urinary tract infections within approximatly past 12 months     Has started trying the FODMAP diet since appointment 6/15/2020. She reports it is helping but she has lost 7 lbs in past week. She has been told this may be IBS.       Patient Active Problem List   Diagnosis     Tinnitus     Complex cyst of right ovary--2.5cm-Ruptured,seen ER--1/2020     Major depressive disorder, recurrent episode, moderate (H)     H/O methicillin resistant Staphylococcus aureus infection     Anxiety state     Bipolar disorder, in full remission, most recent episode depressed (H)     Mild intellectual disabilities     Encounter for initial prescription of implantable subdermal contraceptive     Pain in joint, ankle and foot, unspecified laterality     Complex cyst of LEFT OVARY 5cm on US---8/2019, Laparoscopy drainage--8/2019     Left lower quadrant pain--US 5cm Complex LEFT OVARIAN CYST---8/2019, Laparoscopy drainage-SIMPLE CYST---8/2019     S/P LAPAROSCOPY w Drainage LEFT OVARIAN CYST 5cm---8/22/2019     Nexplanon removal     Cyst of left ovary--CT, US  4cm w " internal echos--6/5/20     Past Surgical History:   Procedure Laterality Date     HEAD & NECK SURGERY      laser on face     LAPAROSCOPIC CYSTECTOMY OVARIAN (ONCOLOGY) Left 8/22/2019    Procedure: LAPAROSCOPY TREAT LEFT OVARIAN CYST, MULTIPLE ASPIRATIONS LEFT OVARIAN CYST;  Surgeon: Frow, David Franke, MD;  Location: [facility]     [facility] birthmark to left side of face         Social History     Tobacco Use     Smoking status: Never Smoker     Smokeless tobacco: Never Used   Substance Use Topics     Alcohol use: No     Family History   Problem Relation Age of Onset     Bipolar Disorder Mother      Depression Mother      Heart Disease Father      Diabetes Other      Myocardial Infarction Paternal Grandfather      Lung Cancer Paternal Grandfather      Other - See Comments Paternal Aunt         mulitiple sclerosis     Breast Cancer Maternal Grandfather         x 2         Current Outpatient Medications   Medication Sig Dispense Refill     albuterol (PROAIR HFA/PROVENTIL HFA/VENTOLIN HFA) 108 (90 Base) MCG/ACT inhaler Inhale 2 puffs into the lungs every 6 hours as needed for shortness of breath / dyspnea or wheezing 1 Inhaler 3     budesonide-formoterol (SYMBICORT) 80-4.5 MCG/ACT Inhaler Inhale 2 puffs into the lungs 2 times daily 3 Inhaler 1     docusate sodium (COLACE) 100 MG capsule Take 1 capsule (100 mg) by mouth 2 times daily 120 capsule 0     norethindrone (MICRONOR) 0.35 MG tablet Take 1 tablet (0.35 mg) by mouth 2 times daily (with meals) Begin these new pills when completes current packet of old pills. 56 tablet 11     polyethylene glycol (MIRALAX) 17 GM/SCOOP powder Take 17 g (1 capful) by mouth as needed for constipation 578 g 0     psyllium (METAMUCIL/KONSYL) 58.6 % powder Take 6-12 g (1-2 teaspoonful) by mouth daily 425 g 0     dicyclomine (BENTYL) 10 MG capsule Take 1 capsule (10 mg) by mouth 4 times daily (before meals and nightly) (Patient not taking: Reported on 7/6/2020) 120 capsule 4     Allergies  "  Allergen Reactions     Lexapro [Escitalopram] Other (See Comments)     hypersensitivity to touch      Recent Labs   Lab Test 06/03/20  1047 03/03/20 08/15/19  05/08/18  1027 06/22/17  2357  11/20/16  1146   ALT 24 22 13  --   --   --    < > 21   CR 0.68 0.83 0.76   < >  --  0.41*   < > 0.64   GFRESTIMATED >90 >60 >60  --   --  >90  Non  GFR Calc     < > >90  Non  GFR Calc     GFRESTBLACK >90  --   --   --   --  >90  African American GFR Calc     < > >90   GFR Calc     POTASSIUM 3.6 4.0 3.6   < >  --  3.8   < > 3.5   TSH  --   --   --   --  2.26  --   --  1.38    < > = values in this interval not displayed.      BP Readings from Last 3 Encounters:   07/06/20 124/76   06/15/20 122/76   06/03/20 122/66    Wt Readings from Last 3 Encounters:   07/06/20 88.5 kg (195 lb)   06/15/20 88.5 kg (195 lb)   06/03/20 88.5 kg (195 lb)            Reviewed and updated as needed this visit by Provider         Review of Systems   Constitutional, HEENT, cardiovascular, pulmonary, GI, , musculoskeletal, neuro, skin, endocrine and psych systems are negative, except as otherwise noted.      Objective    /76   Pulse 85   Temp 98  F (36.7  C) (Tympanic)   Ht 1.6 m (5' 3\")   Wt 88.5 kg (195 lb)   SpO2 98%   BMI 34.54 kg/m    Body mass index is 34.54 kg/m .     Physical Exam   GENERAL: healthy, alert and no distress  EYES: Eyes grossly normal to inspection, PERRL and conjunctivae and sclerae normal  HENT: ear canals and TM's normal, nose and mouth without ulcers or lesions  NECK: no adenopathy, no asymmetry, masses, or scars and thyroid normal to palpation  RESP: lungs clear to auscultation - no rales, rhonchi or wheezes  CV: regular rate and rhythm, normal S1 S2, no S3 or S4, no murmur, click or rub, no peripheral edema   ABDOMEN: soft,  no hepatosplenomegaly, no masses and bowel sounds normal. Mild tenderness to LLQ and RLQ.   MS: no gross musculoskeletal defects noted, no " edema  SKIN: no suspicious lesions or rashes  NEURO: Normal strength and tone, mentation intact and speech normal  PSYCH: mentation appears normal, affect normal/bright     Results for orders placed or performed in visit on 07/06/20   CRP, inflammation     Status: None   Result Value Ref Range    CRP Inflammation 4.1 0.0 - 8.0 mg/L   Vitamin B12     Status: None   Result Value Ref Range    Vitamin B12 612 193 - 986 pg/mL     Component      Latest Ref Rng & Units 6/3/2020 6/3/2020 6/3/2020          10:47 AM 10:47 AM 10:50 AM   WBC      4.0 - 11.0 10e9/L 8.3     RBC Count      3.8 - 5.2 10e12/L 4.77     Hemoglobin      11.7 - 15.7 g/dL 13.7     Hematocrit      35.0 - 47.0 % 40.0     MCV      78 - 100 fl 84     MCH      26.5 - 33.0 pg 28.7     MCHC      31.5 - 36.5 g/dL 34.3     RDW      10.0 - 15.0 % 12.8     Platelet Count      150 - 450 10e9/L 273     Diff Method       Automated Method     % Neutrophils      % 50.4     % Lymphocytes      % 32.2     % Monocytes      % 9.8     % Eosinophils      % 6.9     % Basophils      % 0.6     % Immature Granulocytes      % 0.1     Nucleated RBCs      0 /100 0     Absolute Neutrophil      1.6 - 8.3 10e9/L 4.2     Absolute Lymphocytes      0.8 - 5.3 10e9/L 2.7     Absolute Monocytes      0.0 - 1.3 10e9/L 0.8     Absolute Eosinophils      0.0 - 0.7 10e9/L 0.6     Absolute Basophils      0.0 - 0.2 10e9/L 0.1     Abs Immature Granulocytes      0 - 0.4 10e9/L 0.0     Absolute Nucleated RBC       0.0     Color Urine            Appearance Urine            Glucose Urine      NEG:Negative mg/dL      Bilirubin Urine      NEG:Negative      Ketones Urine      NEG:Negative mg/dL      Specific Gravity Urine      1.003 - 1.035      Blood Urine      NEG:Negative      pH Urine      4.7 - 8.0 pH      Protein Albumin Urine      NEG:Negative mg/dL      Urobilinogen mg/dL      0.0 - 2.0 mg/dL      Nitrite Urine      NEG:Negative      Leukocyte Esterase Urine      NEG:Negative      Source             RBC Urine      0 - 2 /HPF      WBC Urine      0 - 5 /HPF      Bacteria Urine      NEG:Negative /HPF      Squamous Epithelial /HPF Urine      0 - 1 /HPF      Mucous Urine      NEG:Negative /LPF      Sodium      133 - 144 mmol/L  138    Potassium      3.4 - 5.3 mmol/L  3.6    Chloride      94 - 109 mmol/L  107    Carbon Dioxide      20 - 32 mmol/L  22    Anion Gap      3 - 14 mmol/L  9    Glucose      70 - 99 mg/dL  93    Urea Nitrogen      7 - 30 mg/dL  15    Creatinine      0.52 - 1.04 mg/dL  0.68    GFR Estimate      >60 mL/min/1.73:m2  >90    GFR Estimate If Black      >60 mL/min/1.73:m2  >90    Calcium      8.5 - 10.1 mg/dL  8.6    Bilirubin Total      0.2 - 1.3 mg/dL  0.2    Albumin      3.4 - 5.0 g/dL  4.0    Protein Total      6.8 - 8.8 g/dL  8.4    Alkaline Phosphatase      40 - 150 U/L  55    ALT      0 - 50 U/L  24    AST      0 - 45 U/L  15    Specimen Source:            Neisseria gonorrhoreae PCR      NDET:Not Detected      Chlamydia Trachomatis PCR      NDET:Not Detected      HCG Qual Urine      NEG:Negative   Negative   CRP Inflammation      0.0 - 8.0 mg/L      Vitamin B12      193 - 986 pg/mL        Component      Latest Ref Rng & Units 6/3/2020 6/3/2020 7/6/2020          10:50 AM 10:50 AM 11:58 AM   WBC      4.0 - 11.0 10e9/L      RBC Count      3.8 - 5.2 10e12/L      Hemoglobin      11.7 - 15.7 g/dL      Hematocrit      35.0 - 47.0 %      MCV      78 - 100 fl      MCH      26.5 - 33.0 pg      MCHC      31.5 - 36.5 g/dL      RDW      10.0 - 15.0 %      Platelet Count      150 - 450 10e9/L      Diff Method            % Neutrophils      %      % Lymphocytes      %      % Monocytes      %      % Eosinophils      %      % Basophils      %      % Immature Granulocytes      %      Nucleated RBCs      0 /100      Absolute Neutrophil      1.6 - 8.3 10e9/L      Absolute Lymphocytes      0.8 - 5.3 10e9/L      Absolute Monocytes      0.0 - 1.3 10e9/L      Absolute Eosinophils      0.0 - 0.7 10e9/L       Absolute Basophils      0.0 - 0.2 10e9/L      Abs Immature Granulocytes      0 - 0.4 10e9/L      Absolute Nucleated RBC            Color Urine       Yellow     Appearance Urine       Clear     Glucose Urine      NEG:Negative mg/dL Negative     Bilirubin Urine      NEG:Negative Negative     Ketones Urine      NEG:Negative mg/dL Negative     Specific Gravity Urine      1.003 - 1.035 1.035     Blood Urine      NEG:Negative Negative     pH Urine      4.7 - 8.0 pH 6.0     Protein Albumin Urine      NEG:Negative mg/dL 10 (A)     Urobilinogen mg/dL      0.0 - 2.0 mg/dL Normal     Nitrite Urine      NEG:Negative Negative     Leukocyte Esterase Urine      NEG:Negative Small (A)     Source       Midstream Urine     RBC Urine      0 - 2 /HPF 2     WBC Urine      0 - 5 /HPF 6 (H)     Bacteria Urine      NEG:Negative /HPF None (A)     Squamous Epithelial /HPF Urine      0 - 1 /HPF 11 (H)     Mucous Urine      NEG:Negative /LPF Present (A)     Sodium      133 - 144 mmol/L      Potassium      3.4 - 5.3 mmol/L      Chloride      94 - 109 mmol/L      Carbon Dioxide      20 - 32 mmol/L      Anion Gap      3 - 14 mmol/L      Glucose      70 - 99 mg/dL      Urea Nitrogen      7 - 30 mg/dL      Creatinine      0.52 - 1.04 mg/dL      GFR Estimate      >60 mL/min/1.73:m2      GFR Estimate If Black      >60 mL/min/1.73:m2      Calcium      8.5 - 10.1 mg/dL      Bilirubin Total      0.2 - 1.3 mg/dL      Albumin      3.4 - 5.0 g/dL      Protein Total      6.8 - 8.8 g/dL      Alkaline Phosphatase      40 - 150 U/L      ALT      0 - 50 U/L      AST      0 - 45 U/L      Specimen Source:        Urine    Neisseria gonorrhoreae PCR      NDET:Not Detected  Not Detected    Chlamydia Trachomatis PCR      NDET:Not Detected  Not Detected    HCG Qual Urine      NEG:Negative      CRP Inflammation      0.0 - 8.0 mg/L   4.1   Vitamin B12      193 - 986 pg/mL        Component      Latest Ref Rng & Units 7/6/2020          11:58 AM   WBC      4.0 - 11.0  10e9/L    RBC Count      3.8 - 5.2 10e12/L    Hemoglobin      11.7 - 15.7 g/dL    Hematocrit      35.0 - 47.0 %    MCV      78 - 100 fl    MCH      26.5 - 33.0 pg    MCHC      31.5 - 36.5 g/dL    RDW      10.0 - 15.0 %    Platelet Count      150 - 450 10e9/L    Diff Method          % Neutrophils      %    % Lymphocytes      %    % Monocytes      %    % Eosinophils      %    % Basophils      %    % Immature Granulocytes      %    Nucleated RBCs      0 /100    Absolute Neutrophil      1.6 - 8.3 10e9/L    Absolute Lymphocytes      0.8 - 5.3 10e9/L    Absolute Monocytes      0.0 - 1.3 10e9/L    Absolute Eosinophils      0.0 - 0.7 10e9/L    Absolute Basophils      0.0 - 0.2 10e9/L    Abs Immature Granulocytes      0 - 0.4 10e9/L    Absolute Nucleated RBC          Color Urine          Appearance Urine          Glucose Urine      NEG:Negative mg/dL    Bilirubin Urine      NEG:Negative    Ketones Urine      NEG:Negative mg/dL    Specific Gravity Urine      1.003 - 1.035    Blood Urine      NEG:Negative    pH Urine      4.7 - 8.0 pH    Protein Albumin Urine      NEG:Negative mg/dL    Urobilinogen mg/dL      0.0 - 2.0 mg/dL    Nitrite Urine      NEG:Negative    Leukocyte Esterase Urine      NEG:Negative    Source          RBC Urine      0 - 2 /HPF    WBC Urine      0 - 5 /HPF    Bacteria Urine      NEG:Negative /HPF    Squamous Epithelial /HPF Urine      0 - 1 /HPF    Mucous Urine      NEG:Negative /LPF    Sodium      133 - 144 mmol/L    Potassium      3.4 - 5.3 mmol/L    Chloride      94 - 109 mmol/L    Carbon Dioxide      20 - 32 mmol/L    Anion Gap      3 - 14 mmol/L    Glucose      70 - 99 mg/dL    Urea Nitrogen      7 - 30 mg/dL    Creatinine      0.52 - 1.04 mg/dL    GFR Estimate      >60 mL/min/1.73:m2    GFR Estimate If Black      >60 mL/min/1.73:m2    Calcium      8.5 - 10.1 mg/dL    Bilirubin Total      0.2 - 1.3 mg/dL    Albumin      3.4 - 5.0 g/dL    Protein Total      6.8 - 8.8 g/dL    Alkaline Phosphatase       "40 - 150 U/L    ALT      0 - 50 U/L    AST      0 - 45 U/L    Specimen Source:          Neisseria gonorrhoreae PCR      NDET:Not Detected    Chlamydia Trachomatis PCR      NDET:Not Detected    HCG Qual Urine      NEG:Negative    CRP Inflammation      0.0 - 8.0 mg/L    Vitamin B12      193 - 986 pg/mL 612       Assessment & Plan   1. Irritable bowel syndrome with both constipation and diarrhea  - Fecal Lactoferrin; Future  - CRP, inflammation  - docusate sodium (COLACE) 100 MG capsule; Take 1 capsule (100 mg) by mouth 2 times daily  Dispense: 120 capsule; Refill: 0  - polyethylene glycol (MIRALAX) 17 GM/SCOOP powder; Take 17 g (1 capful) by mouth as needed for constipation  Dispense: 578 g; Refill: 0  - psyllium (METAMUCIL/KONSYL) 58.6 % powder; Take 6-12 g (1-2 teaspoonful) by mouth daily  Dispense: 425 g; Refill: 0  - GASTROENTEROLOGY ADULT REF CONSULT ONLY; Future  - Vitamin B12    We have discussed at length the importance of fiber and GI motility. I have recommended that she slowly add in daily fiber and start daily stool softener. Laxitive only as needed. I also have advised a daily log of symptoms, foods, medications, stressors, etc.       BMI:   Estimated body mass index is 34.54 kg/m  as calculated from the following:    Height as of this encounter: 1.6 m (5' 3\").    Weight as of this encounter: 88.5 kg (195 lb).   Weight management plan: Discussed healthy diet and exercise guidelines    Return in about 4 weeks (around 8/3/2020).    Alisha Santoro, [facility] [place]      "

## 2020-07-06 ENCOUNTER — OFFICE VISIT (OUTPATIENT)
Dept: FAMILY MEDICINE | Facility: OTHER | Age: 20
End: 2020-07-06
Attending: NURSE PRACTITIONER
Payer: COMMERCIAL

## 2020-07-06 VITALS
OXYGEN SATURATION: 98 % | HEIGHT: 63 IN | BODY MASS INDEX: 34.55 KG/M2 | HEART RATE: 85 BPM | TEMPERATURE: 98 F | SYSTOLIC BLOOD PRESSURE: 124 MMHG | WEIGHT: 195 LBS | DIASTOLIC BLOOD PRESSURE: 76 MMHG

## 2020-07-06 DIAGNOSIS — K58.2 IRRITABLE BOWEL SYNDROME WITH BOTH CONSTIPATION AND DIARRHEA: Primary | ICD-10-CM

## 2020-07-06 LAB — CRP SERPL-MCNC: 4.1 MG/L (ref 0–8)

## 2020-07-06 PROCEDURE — 36415 COLL VENOUS BLD VENIPUNCTURE: CPT | Mod: ZL | Performed by: NURSE PRACTITIONER

## 2020-07-06 PROCEDURE — 99214 OFFICE O/P EST MOD 30 MIN: CPT | Performed by: NURSE PRACTITIONER

## 2020-07-06 PROCEDURE — 82607 VITAMIN B-12: CPT | Mod: ZL | Performed by: NURSE PRACTITIONER

## 2020-07-06 PROCEDURE — G0463 HOSPITAL OUTPT CLINIC VISIT: HCPCS

## 2020-07-06 PROCEDURE — 86140 C-REACTIVE PROTEIN: CPT | Mod: ZL | Performed by: NURSE PRACTITIONER

## 2020-07-06 RX ORDER — DOCUSATE SODIUM 100 MG/1
100 CAPSULE, LIQUID FILLED ORAL 2 TIMES DAILY
Qty: 120 CAPSULE | Refills: 0 | Status: SHIPPED | OUTPATIENT
Start: 2020-07-06 | End: 2020-09-04

## 2020-07-06 RX ORDER — POLYETHYLENE GLYCOL 3350 17 G/17G
1 POWDER, FOR SOLUTION ORAL PRN
Qty: 578 G | Refills: 0 | Status: SHIPPED | OUTPATIENT
Start: 2020-07-06 | End: 2020-09-04

## 2020-07-06 ASSESSMENT — PAIN SCALES - GENERAL: PAINLEVEL: NO PAIN (0)

## 2020-07-06 ASSESSMENT — MIFFLIN-ST. JEOR: SCORE: 1623.64

## 2020-07-06 NOTE — NURSING NOTE
"Chief Complaint   Patient presents with     Abdominal Pain       Initial /76   Pulse 85   Temp 98  F (36.7  C) (Tympanic)   Ht 1.6 m (5' 3\")   Wt 88.5 kg (195 lb)   SpO2 98%   BMI 34.54 kg/m   Estimated body mass index is 34.54 kg/m  as calculated from the following:    Height as of this encounter: 1.6 m (5' 3\").    Weight as of this encounter: 88.5 kg (195 lb).  Medication Reconciliation: complete  Hilda Gifford LPN  "

## 2020-07-06 NOTE — PATIENT INSTRUCTIONS
Daily  Docusate (Colace) twice a day unless stools are loose, then hold  Fiber (Metamucil) (okay to use generic if insurance does not cover): Daily in AM    As needed only for constipation: polyethylene glycol (Miralax) per package instructions

## 2020-07-07 LAB — VIT B12 SERPL-MCNC: 612 PG/ML (ref 193–986)

## 2020-07-24 ENCOUNTER — TRANSFERRED RECORDS (OUTPATIENT)
Dept: HEALTH INFORMATION MANAGEMENT | Facility: CLINIC | Age: 20
End: 2020-07-24

## 2020-07-24 ENCOUNTER — OFFICE VISIT (OUTPATIENT)
Dept: CARE COORDINATION | Facility: OTHER | Age: 20
End: 2020-07-24
Attending: INTERNAL MEDICINE
Payer: COMMERCIAL

## 2020-07-24 VITALS
WEIGHT: 187 LBS | HEART RATE: 96 BPM | DIASTOLIC BLOOD PRESSURE: 66 MMHG | TEMPERATURE: 97.4 F | OXYGEN SATURATION: 98 % | HEIGHT: 63 IN | SYSTOLIC BLOOD PRESSURE: 112 MMHG | BODY MASS INDEX: 33.13 KG/M2

## 2020-07-24 DIAGNOSIS — K58.2 IRRITABLE BOWEL SYNDROME WITH BOTH CONSTIPATION AND DIARRHEA: Primary | ICD-10-CM

## 2020-07-24 PROCEDURE — G0463 HOSPITAL OUTPT CLINIC VISIT: HCPCS

## 2020-07-24 ASSESSMENT — PAIN SCALES - GENERAL: PAINLEVEL: NO PAIN (0)

## 2020-07-24 ASSESSMENT — MIFFLIN-ST. JEOR: SCORE: 1587.36

## 2020-07-24 NOTE — NURSING NOTE
"Chief Complaint   Patient presents with     Consult     Irritable bowel syndrome w/ constipation and diarrhea       Initial /66 (BP Location: Right arm, Patient Position: Sitting, Cuff Size: Adult Regular)   Pulse 96   Temp 97.4  F (36.3  C) (Tympanic)   Ht 1.6 m (5' 3\")   Wt 84.8 kg (187 lb)   SpO2 98%   BMI 33.13 kg/m   Estimated body mass index is 33.13 kg/m  as calculated from the following:    Height as of this encounter: 1.6 m (5' 3\").    Weight as of this encounter: 84.8 kg (187 lb).  Medication Reconciliation: complete  Taya Patel MA    "

## 2020-07-27 ENCOUNTER — OFFICE VISIT (OUTPATIENT)
Dept: FAMILY MEDICINE | Facility: OTHER | Age: 20
End: 2020-07-27
Attending: NURSE PRACTITIONER
Payer: COMMERCIAL

## 2020-07-27 VITALS
SYSTOLIC BLOOD PRESSURE: 110 MMHG | BODY MASS INDEX: 32.95 KG/M2 | OXYGEN SATURATION: 98 % | TEMPERATURE: 98.7 F | HEART RATE: 77 BPM | WEIGHT: 186 LBS | DIASTOLIC BLOOD PRESSURE: 81 MMHG

## 2020-07-27 DIAGNOSIS — K58.1 IRRITABLE BOWEL SYNDROME WITH CONSTIPATION: ICD-10-CM

## 2020-07-27 PROCEDURE — G0463 HOSPITAL OUTPT CLINIC VISIT: HCPCS

## 2020-07-27 PROCEDURE — 99214 OFFICE O/P EST MOD 30 MIN: CPT | Performed by: NURSE PRACTITIONER

## 2020-07-27 ASSESSMENT — PAIN SCALES - GENERAL: PAINLEVEL: SEVERE PAIN (6)

## 2020-07-27 NOTE — PROGRESS NOTES
"Noted in the appointment scheduled for 8/25 - \"fu 4wks ibs, started levsin 0.125 and citrucel\" and I spoke with Becki at [facility]'s office she also stated from his note/dictation \"IBS and dyspepsia\"  "

## 2020-07-27 NOTE — PROGRESS NOTES
Subjective     Meg Diallo is a 20 year old female who presents to clinic today for the following health issues:    HPI     ABDOMINAL   PAIN     Onset: Follow up     Description:   Character: She reports Sharp, tingling, numbness that work her up from sleep yesterday.  Location: epigastric region- worse on the right side. It felt like I was severed below the waist. I could not feel my legs or feet. This has fully resolved by this visit.  Radiation:all over    Intensity: moderate    Progression of Symptoms:  same and constant    Accompanying Signs & Symptoms:  Fever/Chills?: no   Gas/Bloating: YES  Nausea: YES  Vomitting: no   Diarrhea?: YES  Constipation:YES  Dysuria or Hematuria: no    History:   Trauma: no   Previous similar pain: YES-   Previous tests done: x-ray and CT    Precipitating factors:   Does the pain change with:     Food: YES     BM: YES    Urination: no     Alleviating factors:  none    Therapies Tried and outcome: colace, miralax, laxatives, gastro    LMP:  not applicable    She is currently being followed by Dr. Galvez (gastroenterology). She reports confusion over her dx with her most recent visit there.         Patient Active Problem List   Diagnosis     Tinnitus     Complex cyst of right ovary--2.5cm-Ruptured,seen ER--1/2020     Major depressive disorder, recurrent episode, moderate (H)     H/O methicillin resistant Staphylococcus aureus infection     Anxiety state     Bipolar disorder, in full remission, most recent episode depressed (H)     Mild intellectual disabilities     Encounter for initial prescription of implantable subdermal contraceptive     Pain in joint, ankle and foot, unspecified laterality     Complex cyst of LEFT OVARY 5cm on US---8/2019, Laparoscopy drainage--8/2019     Left lower quadrant pain--US 5cm Complex LEFT OVARIAN CYST---8/2019, Laparoscopy drainage-SIMPLE CYST---8/2019     S/P LAPAROSCOPY w Drainage LEFT OVARIAN CYST 5cm---8/22/2019     Nexplanon removal     Cyst  of left ovary--CT, US  4cm w internal echos--6/5/20     Past Surgical History:   Procedure Laterality Date     HEAD & NECK SURGERY      laser on face     LAPAROSCOPIC CYSTECTOMY OVARIAN (ONCOLOGY) Left 8/22/2019    Procedure: LAPAROSCOPY TREAT LEFT OVARIAN CYST, MULTIPLE ASPIRATIONS LEFT OVARIAN CYST;  Surgeon: Frow, David Franke, MD;  Location: [facility]     [facility] surgery birthmark to left side of face         Social History     Tobacco Use     Smoking status: Never Smoker     Smokeless tobacco: Never Used   Substance Use Topics     Alcohol use: No     Family History   Problem Relation Age of Onset     Bipolar Disorder Mother      Depression Mother      Heart Disease Father      Diabetes Other      Myocardial Infarction Paternal Grandfather      Lung Cancer Paternal Grandfather      Other - See Comments Paternal Aunt         mulitiple sclerosis     Breast Cancer Maternal Grandfather         x 2         Current Outpatient Medications   Medication Sig Dispense Refill     albuterol (PROAIR HFA/PROVENTIL HFA/VENTOLIN HFA) 108 (90 Base) MCG/ACT inhaler Inhale 2 puffs into the lungs every 6 hours as needed for shortness of breath / dyspnea or wheezing 1 Inhaler 3     budesonide-formoterol (SYMBICORT) 80-4.5 MCG/ACT Inhaler Inhale 2 puffs into the lungs 2 times daily 3 Inhaler 1     docusate sodium (COLACE) 100 MG capsule Take 1 capsule (100 mg) by mouth 2 times daily 120 capsule 0     methylcellulose (CITRUCEL) 500 MG TABS tablet Take 2 tablets (1,000 mg) by mouth daily 180 tablet 0     norethindrone (MICRONOR) 0.35 MG tablet Take 1 tablet (0.35 mg) by mouth 2 times daily (with meals) Begin these new pills when completes current packet of old pills. 56 tablet 11     polyethylene glycol (MIRALAX) 17 GM/SCOOP powder Take 17 g (1 capful) by mouth as needed for constipation 578 g 0     Allergies   Allergen Reactions     Lexapro [Escitalopram] Other (See Comments)     hypersensitivity to touch      Recent Labs   Lab Test  06/03/20  1047 03/03/20 08/15/19  05/08/18  1027 06/22/17  2357  11/20/16  1146   ALT 24 22 13  --   --   --    < > 21   CR 0.68 0.83 0.76   < >  --  0.41*   < > 0.64   GFRESTIMATED >90 >60 >60  --   --  >90  Non  GFR Calc     < > >90  Non  GFR Calc     GFRESTBLACK >90  --   --   --   --  >90  African American GFR Calc     < > >90   GFR Calc     POTASSIUM 3.6 4.0 3.6   < >  --  3.8   < > 3.5   TSH  --   --   --   --  2.26  --   --  1.38    < > = values in this interval not displayed.      BP Readings from Last 3 Encounters:   07/27/20 110/81   07/24/20 112/66   07/06/20 124/76    Wt Readings from Last 3 Encounters:   07/27/20 84.4 kg (186 lb)   07/24/20 84.8 kg (187 lb)   07/06/20 88.5 kg (195 lb)      Reviewed and updated as needed this visit by Provider         Review of Systems   Constitutional, HEENT, cardiovascular, pulmonary, gi and gu systems are negative, except as otherwise noted.      Objective    /81 (BP Location: Right arm, Patient Position: Chair, Cuff Size: Adult Regular)   Pulse 77   Temp 98.7  F (37.1  C) (Tympanic)   Wt 84.4 kg (186 lb)   SpO2 98%   BMI 32.95 kg/m    Body mass index is 32.95 kg/m .     Physical Exam   GENERAL: alert and no distress  NECK: no adenopathy, no asymmetry, masses, or scars and thyroid normal to palpation  RESP: lungs clear to auscultation - no rales, rhonchi or wheezes  CV: regular rate and rhythm, normal S1 S2, no S3 or S4, no murmur, click or rub, no peripheral edema and peripheral pulses strong  ABDOMEN: soft, no hepatosplenomegaly, no masses and bowel sounds normal. Meg has some generalized abdominal tenderness, which remains at baseline. Tenderness improved to LLQ since previous visits.  MS: no gross musculoskeletal defects noted, no edema  SKIN: no suspicious lesions or rashes  NEURO: Normal strength and tone, mentation intact and speech normal  PSYCH: mentation appears normal, affect is anxious and at  "patient's baseline    Results for orders placed or performed in visit on 07/27/20   Helicobacter pylori Antigen Stool     Status: None   Result Value Ref Range    Helicobacter pylori Antigen Stool Negative NEG^Negative         Assessment & Plan   1. Irritable bowel syndrome with constipation  - methylcellulose (CITRUCEL) 500 MG TABS tablet; Take 2 tablets (1,000 mg) by mouth daily  Dispense: 180 tablet; Refill: 0  - H Pylori antigen stool; Future  - Helicobacter pylori Antigen Stool      No follow-ups on file.    Alisha Santoro, [facility]    7/29/2020 1130 See patient communication. Dr. Galvez dx: IBS and dyspepsia. Will order an H-pylori stool and occult blood  today to be added to existing lactoferrin future order.     Plan: Follow up with Dr. Galvez in gastroenterology. If no clear answers, may consider referral to general surgery. As for the feeling of the lower half of her body \"being severed\", we have discussed to report and future episodes of this. Nothing was noted on her abdominal CT in June about any spinal abnormality or mass. As there was no loss of bowel or bladder with this episode and it was bilateral, I suspect positioning during sleep may have been a factor.    "

## 2020-07-27 NOTE — NURSING NOTE
"Chief Complaint   Patient presents with     Gastrointestinal Problem       Initial /81 (BP Location: Right arm, Patient Position: Chair, Cuff Size: Adult Regular)   Pulse 77   Temp 98.7  F (37.1  C) (Tympanic)   Wt 84.4 kg (186 lb)   SpO2 98%   BMI 32.95 kg/m   Estimated body mass index is 32.95 kg/m  as calculated from the following:    Height as of 7/24/20: 1.6 m (5' 3\").    Weight as of this encounter: 84.4 kg (186 lb).  Medication Reconciliation: complete  JANICE OSHEA LPN    "

## 2020-07-27 NOTE — Clinical Note
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   Thanks  Alisha Santoro, APRN, CNP

## 2020-07-28 ENCOUNTER — TELEPHONE (OUTPATIENT)
Dept: FAMILY MEDICINE | Facility: OTHER | Age: 20
End: 2020-07-28

## 2020-07-28 NOTE — TELEPHONE ENCOUNTER
Received a PA from [employer] for Citrucel 500 mg tablets. Submitted on CMM. Waiting for a response.

## 2020-07-29 NOTE — TELEPHONE ENCOUNTER
Received a DENIAL from [facility] for Citrucel 500 mg tablets.    Forms scanned to [facility].

## 2020-07-30 DIAGNOSIS — K58.2 IRRITABLE BOWEL SYNDROME WITH BOTH CONSTIPATION AND DIARRHEA: ICD-10-CM

## 2020-07-30 DIAGNOSIS — K58.1 IRRITABLE BOWEL SYNDROME WITH CONSTIPATION: ICD-10-CM

## 2020-07-30 LAB
HEMOCCULT SP1 STL QL: NEGATIVE
LACTOFERRIN STL QL IA: NEGATIVE

## 2020-07-30 PROCEDURE — 87338 HPYLORI STOOL AG IA: CPT | Mod: ZL | Performed by: NURSE PRACTITIONER

## 2020-07-30 PROCEDURE — 83630 LACTOFERRIN FECAL (QUAL): CPT | Mod: ZL | Performed by: NURSE PRACTITIONER

## 2020-07-30 PROCEDURE — 82274 ASSAY TEST FOR BLOOD FECAL: CPT | Mod: ZL | Performed by: NURSE PRACTITIONER

## 2020-07-31 LAB — H PYLORI AG STL QL IA: NEGATIVE

## 2020-08-21 ENCOUNTER — TRANSFERRED RECORDS (OUTPATIENT)
Dept: HEALTH INFORMATION MANAGEMENT | Facility: CLINIC | Age: 20
End: 2020-08-21

## 2020-08-21 LAB
ALT SERPL-CCNC: 12 IU/L (ref 6–31)
AST SERPL-CCNC: 15 IU/L (ref 10–40)
CREAT SERPL-MCNC: 0.73 MG/DL (ref 0.4–1)
GFR SERPL CREATININE-BSD FRML MDRD: >60 ML/MIN/1.73M*2
GLUCOSE SERPL-MCNC: 91 MG/DL (ref 70–99)
POTASSIUM SERPL-SCNC: 3.3 MEQ/L (ref 3.4–5.1)

## 2020-09-11 ENCOUNTER — TELEPHONE (OUTPATIENT)
Dept: OBGYN | Facility: OTHER | Age: 20
End: 2020-09-11

## 2020-09-11 ENCOUNTER — OFFICE VISIT (OUTPATIENT)
Dept: OBGYN | Facility: OTHER | Age: 20
End: 2020-09-11
Attending: OBSTETRICS & GYNECOLOGY
Payer: COMMERCIAL

## 2020-09-11 VITALS
BODY MASS INDEX: 32.96 KG/M2 | HEART RATE: 74 BPM | WEIGHT: 186 LBS | OXYGEN SATURATION: 99 % | SYSTOLIC BLOOD PRESSURE: 108 MMHG | HEIGHT: 63 IN | DIASTOLIC BLOOD PRESSURE: 74 MMHG

## 2020-09-11 DIAGNOSIS — N83.201 OVARIAN CYST, RIGHT: Primary | ICD-10-CM

## 2020-09-11 PROCEDURE — 99213 OFFICE O/P EST LOW 20 MIN: CPT | Performed by: OBSTETRICS & GYNECOLOGY

## 2020-09-11 PROCEDURE — G0463 HOSPITAL OUTPT CLINIC VISIT: HCPCS | Performed by: COUNSELOR

## 2020-09-11 ASSESSMENT — PAIN SCALES - GENERAL: PAINLEVEL: MILD PAIN (2)

## 2020-09-11 ASSESSMENT — MIFFLIN-ST. JEOR: SCORE: 1582.82

## 2020-09-11 NOTE — PROGRESS NOTES
"Meg Diallo is a 20 year old  who has a h/o painful ovarian cysts on both ovaries. One year ago she had a cyst laparoscopically drained on the left ovary. Last week she went to the [place] ED and was diagnosed with a complex right ovarian cyst measuring 6.6 cm. She has been on multiple OCP regimens to limit cyst formation but is distressed that nothing has worked.     ROS:  Constitutional, neuro, endocrine, gastrointestinal, genitourinary and psychiatric systems are otherwise negative.    Past Medical History:   Diagnosis Date     Attention deficit disorder of childhood without me 03/05/2007     Depressive disorder      Facial hemangioma 07/23/2012     Hemangioma of skin and subcutaneous tissue 2000     Nasal injury 07/23/2012     Nasal turbinate hypertrophy 07/23/2012     Tonsillar hypertrophy 07/23/2012     Past Surgical History:   Procedure Laterality Date     HEAD & NECK SURGERY      laser on face     LAPAROSCOPIC CYSTECTOMY OVARIAN (ONCOLOGY) Left 8/22/2019    Procedure: LAPAROSCOPY TREAT LEFT OVARIAN CYST, MULTIPLE ASPIRATIONS LEFT OVARIAN CYST;  Surgeon: Frow, David Franke, MD;  Location: [facility]     [facility] surgery birthmark to left side of face          Allergies   Allergen Reactions     Lexapro [Escitalopram] Other (See Comments)     hypersensitivity to touch        EXAM  /74 (BP Location: Left arm, Patient Position: Sitting, Cuff Size: Adult Regular)   Pulse 74   Ht 1.6 m (5' 3\")   Wt 84.4 kg (186 lb)   SpO2 99%   BMI 32.95 kg/m    Gen - NAD    A/P Recurrent painful ovarian cysts   1. Pt has prescription for Norethindrone that she has not started yet. We discussed this Rx vs starting Depo-provera. Pt would like to try the OCP first.   2. Will get repeat ultrasound in two weeks to reevaluate the cyst on the right. We discussed that at her age an oophorectomy would be avoided at all costs and we wouldn't want to be performing bi-yearly laparoscopy either. Will see if cyst is persistent " and base mgmt on findings    KEVIN BEARDEN MD

## 2020-09-11 NOTE — NURSING NOTE
"Chief Complaint   Patient presents with     Hospital F/U     ED, Right Ovarian Cyst       Initial /74 (BP Location: Left arm, Patient Position: Sitting, Cuff Size: Adult Regular)   Pulse 74   Ht 1.6 m (5' 3\")   Wt 84.4 kg (186 lb)   SpO2 99%   BMI 32.95 kg/m   Estimated body mass index is 32.95 kg/m  as calculated from the following:    Height as of this encounter: 1.6 m (5' 3\").    Weight as of this encounter: 84.4 kg (186 lb).  Medication Reconciliation: complete     Kusum Liz LPN    "

## 2020-09-11 NOTE — TELEPHONE ENCOUNTER
Tried calling patient back but she does not have her voicemail set up at this time. Will try again later.

## 2020-09-29 ENCOUNTER — OFFICE VISIT (OUTPATIENT)
Dept: CARE COORDINATION | Facility: OTHER | Age: 20
End: 2020-09-29
Attending: INTERNAL MEDICINE
Payer: COMMERCIAL

## 2020-09-29 VITALS
RESPIRATION RATE: 16 BRPM | SYSTOLIC BLOOD PRESSURE: 112 MMHG | WEIGHT: 182 LBS | DIASTOLIC BLOOD PRESSURE: 64 MMHG | HEIGHT: 63 IN | TEMPERATURE: 97.5 F | HEART RATE: 64 BPM | OXYGEN SATURATION: 99 % | BODY MASS INDEX: 32.25 KG/M2

## 2020-09-29 DIAGNOSIS — K58.2 IRRITABLE BOWEL SYNDROME WITH BOTH CONSTIPATION AND DIARRHEA: Primary | ICD-10-CM

## 2020-09-29 PROCEDURE — G0463 HOSPITAL OUTPT CLINIC VISIT: HCPCS

## 2020-09-29 ASSESSMENT — MIFFLIN-ST. JEOR: SCORE: 1564.68

## 2020-09-29 ASSESSMENT — PAIN SCALES - GENERAL: PAINLEVEL: NO PAIN (0)

## 2020-09-29 NOTE — NURSING NOTE
"Chief Complaint   Patient presents with     RECHECK     IBS       Initial /64   Pulse 64   Temp 97.5  F (36.4  C) (Tympanic)   Resp 16   Ht 1.6 m (5' 3\")   Wt 82.6 kg (182 lb)   SpO2 99%   BMI 32.24 kg/m   Estimated body mass index is 32.24 kg/m  as calculated from the following:    Height as of this encounter: 1.6 m (5' 3\").    Weight as of this encounter: 82.6 kg (182 lb).  Medication Reconciliation: complete  Orin Reis LPN    "

## 2020-09-30 NOTE — PROGRESS NOTES
Subjective     Meg Diallo is a 20 year old female who presents to clinic today for the following health issues:    HPI        Acute Illness  Acute illness concerns: ear pain and sore throat  Onset/Duration: ear for a month and sore throat times 3 weeks   Symptoms:  Fever: no  Chills/Sweats: no  Headache (location?): YES  Sinus Pressure: YES  Conjunctivitis:  no  Ear Pain: YES: bilateral popping   Rhinorrhea: no  Congestion: YES  Sore Throat: YES  Cough: YES-non-productive  Wheeze: no  Decreased Appetite: no  Nausea: no  Vomiting: no  Diarrhea: no  Dysuria/Freq.: no  Dysuria or Hematuria: no  Fatigue/Achiness: YES  Sick/Strep Exposure: no  Therapies tried and outcome: tylenol and ibuprofen     Has not been around anyone with covid, has been isolating at home.      Patient Active Problem List   Diagnosis     Tinnitus     Complex cyst of right ovary--2.5cm-Ruptured,seen ER--1/2020     Major depressive disorder, recurrent episode, moderate (H)     H/O methicillin resistant Staphylococcus aureus infection     Anxiety state     Bipolar disorder, in full remission, most recent episode depressed (H)     Mild intellectual disabilities     Encounter for initial prescription of implantable subdermal contraceptive     Pain in joint, ankle and foot, unspecified laterality     Complex cyst of LEFT OVARY 5cm on US---8/2019, Laparoscopy drainage--8/2019     Left lower quadrant pain--US 5cm Complex LEFT OVARIAN CYST---8/2019, Laparoscopy drainage-SIMPLE CYST---8/2019     S/P LAPAROSCOPY w Drainage LEFT OVARIAN CYST 5cm---8/22/2019     Nexplanon removal     Cyst of left ovary--CT, US  4cm w internal echos--6/5/20     Past Surgical History:   Procedure Laterality Date     HEAD & NECK SURGERY      laser on face     LAPAROSCOPIC CYSTECTOMY OVARIAN (ONCOLOGY) Left 8/22/2019    Procedure: LAPAROSCOPY TREAT LEFT OVARIAN CYST, MULTIPLE ASPIRATIONS LEFT OVARIAN CYST;  Surgeon: Frow, David Franke, MD;  Location: [facility] OR     [facility]  birthmark to left side of face         Social History     Tobacco Use     Smoking status: Never Smoker     Smokeless tobacco: Never Used   Substance Use Topics     Alcohol use: No     Family History   Problem Relation Age of Onset     Bipolar Disorder Mother      Depression Mother      Heart Disease Father      Diabetes Other      Myocardial Infarction Paternal Grandfather      Lung Cancer Paternal Grandfather      Other - See Comments Paternal Aunt         mulitiple sclerosis     Breast Cancer Maternal Grandfather         x 2         Current Outpatient Medications   Medication Sig Dispense Refill     albuterol (PROAIR HFA/PROVENTIL HFA/VENTOLIN HFA) 108 (90 Base) MCG/ACT inhaler Inhale 2 puffs into the lungs every 6 hours as needed for shortness of breath / dyspnea or wheezing 1 Inhaler 3     budesonide-formoterol (SYMBICORT) 80-4.5 MCG/ACT Inhaler Inhale 2 puffs into the lungs 2 times daily 3 Inhaler 1     norethindrone (MICRONOR) 0.35 MG tablet Take 1 tablet (0.35 mg) by mouth 2 times daily (with meals) Begin these new pills when completes current packet of old pills. 56 tablet 11     methylcellulose (CITRUCEL) 500 MG TABS tablet Take 2 tablets (1,000 mg) by mouth daily (Patient not taking: Reported on 9/11/2020) 180 tablet 0     Allergies   Allergen Reactions     Lexapro [Escitalopram] Other (See Comments)     hypersensitivity to touch      Recent Labs   Lab Test 08/21/20 06/03/20  1047 03/03/20 05/08/18  1027 05/08/18  1027 06/22/17  2357 11/20/16  1146 11/20/16  1146   ALT 12 24 22   < >  --   --    < > 21   CR 0.73 0.68 0.83   < >  --  0.41*   < > 0.64   GFRESTIMATED >60 >90 >60   < >  --  >90  Non  GFR Calc     < > >90  Non  GFR Calc     GFRESTBLACK  --  >90  --   --   --  >90  African American GFR Calc     < > >90   GFR Calc     POTASSIUM 3.3* 3.6 4.0   < >  --  3.8   < > 3.5   TSH  --   --   --   --  2.26  --   --  1.38    < > = values in this interval not  "displayed.      BP Readings from Last 3 Encounters:   10/02/20 110/66   09/29/20 112/64   09/11/20 108/74    Wt Readings from Last 3 Encounters:   10/02/20 82.3 kg (181 lb 8 oz)   09/29/20 82.6 kg (182 lb)   09/11/20 84.4 kg (186 lb)                      Review of Systems   Constitutional, HEENT, cardiovascular, pulmonary, gi and gu systems are negative, except as otherwise noted.      Objective    /66 (BP Location: Left arm, Patient Position: Chair, Cuff Size: Adult Regular)   Pulse 83   Temp 97.2  F (36.2  C) (Tympanic)   Resp 16   Ht 1.6 m (5' 3\")   Wt 82.3 kg (181 lb 8 oz)   SpO2 99%   BMI 32.15 kg/m    Body mass index is 32.15 kg/m .  Physical Exam   GENERAL: healthy, alert and no distress  HENT: normal cephalic/atraumatic, right ear: unable to visualize TM due to excessive cerumen left ear: erythematous and bulging membrane, nose and mouth without ulcers or lesions, nasal mucosa edematous , rhinorrhea clear and oral mucous membranes moist, throat mildly erythematous.   NECK: no adenopathy, no asymmetry, masses, or scars and thyroid normal to palpation  RESP: lungs clear to auscultation - no rales, rhonchi or wheezes  CV: regular rate and rhythm, normal S1 S2, no S3 or S4, no murmur, click or rub, no peripheral edema and peripheral pulses strong  MS: no gross musculoskeletal defects noted, no edema  PSYCH: mentation appears normal, affect normal/bright          Assessment & Plan     1. OME (otitis media with effusion), left  - amoxicillin-clavulanate (AUGMENTIN) 875-125 MG tablet; Take 1 tablet by mouth 2 times daily for 10 days  Dispense: 20 tablet; Refill: 0    2. Excessive cerumen in ear canal, right  - OTOLARYNGOLOGY REFERRAL    3. TMJ (temporomandibular joint syndrome)  - PHYSICAL THERAPY REFERRAL; Future          Follow-up as needed     Taira Hidalgo, NP  [facility]      "

## 2020-10-02 ENCOUNTER — OFFICE VISIT (OUTPATIENT)
Dept: FAMILY MEDICINE | Facility: OTHER | Age: 20
End: 2020-10-02
Attending: NURSE PRACTITIONER
Payer: COMMERCIAL

## 2020-10-02 VITALS
WEIGHT: 181.5 LBS | TEMPERATURE: 97.2 F | OXYGEN SATURATION: 99 % | HEIGHT: 63 IN | BODY MASS INDEX: 32.16 KG/M2 | DIASTOLIC BLOOD PRESSURE: 66 MMHG | HEART RATE: 83 BPM | RESPIRATION RATE: 16 BRPM | SYSTOLIC BLOOD PRESSURE: 110 MMHG

## 2020-10-02 DIAGNOSIS — H61.21 EXCESSIVE CERUMEN IN EAR CANAL, RIGHT: ICD-10-CM

## 2020-10-02 DIAGNOSIS — H65.92 OME (OTITIS MEDIA WITH EFFUSION), LEFT: Primary | ICD-10-CM

## 2020-10-02 DIAGNOSIS — H65.92 OME (OTITIS MEDIA WITH EFFUSION), LEFT: ICD-10-CM

## 2020-10-02 DIAGNOSIS — M26.609 TMJ (TEMPOROMANDIBULAR JOINT SYNDROME): ICD-10-CM

## 2020-10-02 PROCEDURE — G0463 HOSPITAL OUTPT CLINIC VISIT: HCPCS | Mod: 25

## 2020-10-02 PROCEDURE — 99213 OFFICE O/P EST LOW 20 MIN: CPT | Performed by: NURSE PRACTITIONER

## 2020-10-02 PROCEDURE — G0463 HOSPITAL OUTPT CLINIC VISIT: HCPCS

## 2020-10-02 ASSESSMENT — ANXIETY QUESTIONNAIRES
IF YOU CHECKED OFF ANY PROBLEMS ON THIS QUESTIONNAIRE, HOW DIFFICULT HAVE THESE PROBLEMS MADE IT FOR YOU TO DO YOUR WORK, TAKE CARE OF THINGS AT HOME, OR GET ALONG WITH OTHER PEOPLE: VERY DIFFICULT
5. BEING SO RESTLESS THAT IT IS HARD TO SIT STILL: NEARLY EVERY DAY
3. WORRYING TOO MUCH ABOUT DIFFERENT THINGS: NEARLY EVERY DAY
GAD7 TOTAL SCORE: 21
1. FEELING NERVOUS, ANXIOUS, OR ON EDGE: NEARLY EVERY DAY
6. BECOMING EASILY ANNOYED OR IRRITABLE: NEARLY EVERY DAY
2. NOT BEING ABLE TO STOP OR CONTROL WORRYING: NEARLY EVERY DAY
7. FEELING AFRAID AS IF SOMETHING AWFUL MIGHT HAPPEN: NEARLY EVERY DAY
4. TROUBLE RELAXING: NEARLY EVERY DAY

## 2020-10-02 ASSESSMENT — PAIN SCALES - GENERAL: PAINLEVEL: SEVERE PAIN (7)

## 2020-10-02 ASSESSMENT — PATIENT HEALTH QUESTIONNAIRE - PHQ9: SUM OF ALL RESPONSES TO PHQ QUESTIONS 1-9: 17

## 2020-10-02 ASSESSMENT — MIFFLIN-ST. JEOR: SCORE: 1562.41

## 2020-10-02 NOTE — PATIENT INSTRUCTIONS
Assessment & Plan     1. OME (otitis media with effusion), left  - amoxicillin-clavulanate (AUGMENTIN) 875-125 MG tablet; Take 1 tablet by mouth 2 times daily for 10 days  Dispense: 20 tablet; Refill: 0    2. Excessive cerumen in ear canal, right  - OTOLARYNGOLOGY REFERRAL    3. TMJ (temporomandibular joint syndrome)  - PHYSICAL THERAPY REFERRAL; Future          Follow-up as needed   Taira Hidalgo, NP  [facility]

## 2020-10-02 NOTE — NURSING NOTE
"Chief Complaint   Patient presents with     Ear Problem       Initial /66 (BP Location: Left arm, Patient Position: Chair, Cuff Size: Adult Regular)   Pulse 83   Temp 97.2  F (36.2  C) (Tympanic)   Resp 16   Ht 1.6 m (5' 3\")   Wt 82.3 kg (181 lb 8 oz)   SpO2 99%   BMI 32.15 kg/m   Estimated body mass index is 32.15 kg/m  as calculated from the following:    Height as of this encounter: 1.6 m (5' 3\").    Weight as of this encounter: 82.3 kg (181 lb 8 oz).  Medication Reconciliation: complete  Pamela M. Lechevalier, LPN    "

## 2020-10-02 NOTE — TELEPHONE ENCOUNTER
Patient called and is needing prescription sent to [facility] in [facility]. I called [facility] and they were unable to pull this from [place] [facility] and need the script re sent.   Please advise,thank you.

## 2020-10-03 ASSESSMENT — ANXIETY QUESTIONNAIRES: GAD7 TOTAL SCORE: 21

## 2020-10-16 ENCOUNTER — ANCILLARY PROCEDURE (OUTPATIENT)
Dept: GENERAL RADIOLOGY | Facility: OTHER | Age: 20
End: 2020-10-16
Attending: NURSE PRACTITIONER
Payer: COMMERCIAL

## 2020-10-16 ENCOUNTER — OFFICE VISIT (OUTPATIENT)
Dept: OTOLARYNGOLOGY | Facility: OTHER | Age: 20
End: 2020-10-16
Attending: NURSE PRACTITIONER
Payer: COMMERCIAL

## 2020-10-16 VITALS
SYSTOLIC BLOOD PRESSURE: 102 MMHG | RESPIRATION RATE: 16 BRPM | HEART RATE: 80 BPM | DIASTOLIC BLOOD PRESSURE: 66 MMHG | WEIGHT: 184.6 LBS | BODY MASS INDEX: 32.71 KG/M2 | HEIGHT: 63 IN | OXYGEN SATURATION: 99 % | TEMPERATURE: 97.5 F

## 2020-10-16 DIAGNOSIS — H61.21 IMPACTED CERUMEN OF RIGHT EAR: ICD-10-CM

## 2020-10-16 DIAGNOSIS — M25.572 ACUTE LEFT ANKLE PAIN: ICD-10-CM

## 2020-10-16 DIAGNOSIS — M25.572 PAIN IN JOINT INVOLVING ANKLE AND FOOT, LEFT: Primary | ICD-10-CM

## 2020-10-16 DIAGNOSIS — F41.9 ANXIETY: ICD-10-CM

## 2020-10-16 DIAGNOSIS — R42 LIGHT-HEADED FEELING: ICD-10-CM

## 2020-10-16 PROCEDURE — G0463 HOSPITAL OUTPT CLINIC VISIT: HCPCS | Mod: 25

## 2020-10-16 PROCEDURE — 92504 EAR MICROSCOPY EXAMINATION: CPT | Performed by: NURSE PRACTITIONER

## 2020-10-16 PROCEDURE — 99213 OFFICE O/P EST LOW 20 MIN: CPT | Mod: 25 | Performed by: NURSE PRACTITIONER

## 2020-10-16 PROCEDURE — 69210 REMOVE IMPACTED EAR WAX UNI: CPT | Performed by: NURSE PRACTITIONER

## 2020-10-16 PROCEDURE — 73610 X-RAY EXAM OF ANKLE: CPT | Mod: TC,LT,FY

## 2020-10-16 ASSESSMENT — PAIN SCALES - GENERAL: PAINLEVEL: SEVERE PAIN (7)

## 2020-10-16 ASSESSMENT — MIFFLIN-ST. JEOR: SCORE: 1576.47

## 2020-10-16 NOTE — NURSING NOTE
"Chief Complaint   Patient presents with     Cerumen Impaction     excessive cerumen, possible ear infection       Initial /66   Pulse 80   Temp 97.5  F (36.4  C) (Tympanic)   Resp 16   Ht 1.6 m (5' 3\")   Wt 83.7 kg (184 lb 9.6 oz)   SpO2 99%   BMI 32.70 kg/m   Estimated body mass index is 32.7 kg/m  as calculated from the following:    Height as of this encounter: 1.6 m (5' 3\").    Weight as of this encounter: 83.7 kg (184 lb 9.6 oz).  Medication Reconciliation: complete  Sharon Wheeler LPN  "

## 2020-10-16 NOTE — PROGRESS NOTES
"Otolaryngology Note         Chief Complaint:     Patient presents with:  Cerumen Impaction: excessive cerumen, possible ear infection           History of Present Illness:     Meg Diallo is a 20 year old female who presents today for ear cleaning.  She was recently seen by Bulmaro Marcelino on 10/2/2020 for otalgia and left OM.  She was treated with Augmentin, she has 2 pills to complete.  She has some improvement with the abx.     She denies kimo drainage.  She feels her hearing is decreased bilaterally, ears fell plugged.  She can hear popping when she speaks and swallows.  She feels pressure in her ears.   She has not been taking any AH or nasal sprays.      + tinnitus, intermittent over time.   She hears it about 2 times per week  She fell yesterday at home, twisted her left ankle.  She has had some trouble with walking on it.  She took tylenol yesterday without improvement.  She iced yesterday.  She has also been elevating, but her SO who presents with her today states that \"she is always on her feet\"     She feels dizziness when she stands up and in the shower.  She feels light-headed and cannot balance.  She has been feeling this sensation for a long time.    She drinks 32-64 ounces of water per day    No kimo rotary vertigo, facial numbness or weakness.      She denies hx of COM or otologic surgeries.   She has recurrent pain in the ears she feels due to TMJ.  She was referred to PT for TMJ  She admits she grinds and clenches at times  She has not seen a dentist recently  She has occasional nasal congestion, mostly at night.  She has seasonal allergies, worse in spring and summer.   She does not currently take anything for symptoms   No history of allergy testing.    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    She admits to significant anxiety, was on Celexa in the past, would like to start back on celexa.      She also reports left ankle pain after fall yesterday, the pain is medial and " superior to the medial malleolus.  Has been able to walk.  No consistent numbness or tingling.  She has not taken anything today. She is requesting ankle x-ray           Medications:     Current Outpatient Rx   Medication Sig Dispense Refill     albuterol (PROAIR HFA/PROVENTIL HFA/VENTOLIN HFA) 108 (90 Base) MCG/ACT inhaler Inhale 2 puffs into the lungs every 6 hours as needed for shortness of breath / dyspnea or wheezing 1 Inhaler 3     amoxicillin-clavulanate (AUGMENTIN) 875-125 MG tablet Take 1 tablet by mouth 2 times daily 20 tablet 0     budesonide-formoterol (SYMBICORT) 80-4.5 MCG/ACT Inhaler Inhale 2 puffs into the lungs 2 times daily 3 Inhaler 1     norethindrone (MICRONOR) 0.35 MG tablet Take 1 tablet (0.35 mg) by mouth 2 times daily (with meals) Begin these new pills when completes current packet of old pills. 56 tablet 11     methylcellulose (CITRUCEL) 500 MG TABS tablet Take 2 tablets (1,000 mg) by mouth daily (Patient not taking: Reported on 9/11/2020) 180 tablet 0            Allergies:     Allergies: Lexapro [escitalopram]          Past Medical History:     Past Medical History:   Diagnosis Date     Attention deficit disorder of childhood without me 03/05/2007     Depressive disorder      Facial hemangioma 07/23/2012     Hemangioma of skin and subcutaneous tissue 2000     Nasal injury 07/23/2012     Nasal turbinate hypertrophy 07/23/2012     Tonsillar hypertrophy 07/23/2012            Past Surgical History:     Past Surgical History:   Procedure Laterality Date     HEAD & NECK SURGERY      laser on face     LAPAROSCOPIC CYSTECTOMY OVARIAN (ONCOLOGY) Left 8/22/2019    Procedure: LAPAROSCOPY TREAT LEFT OVARIAN CYST, MULTIPLE ASPIRATIONS LEFT OVARIAN CYST;  Surgeon: Frow, David Franke, MD;  Location: [facility]     [facility] surgery birthmark to left side of face              Social History:     Social History     Tobacco Use     Smoking status: Never Smoker     Smokeless tobacco: Never Used   Substance Use  "Topics     Alcohol use: No     Drug use: No            Review of Systems:     ROS: See HPI         Physical Exam:     /66   Pulse 80   Temp 97.5  F (36.4  C) (Tympanic)   Resp 16   Ht 1.6 m (5' 3\")   Wt 83.7 kg (184 lb 9.6 oz)   SpO2 99%   BMI 32.70 kg/m      General - The patient is well nourished and well developed, and appears to have good nutritional status.  Alert and oriented to person and place, answers questions and cooperates with examination appropriately.   Head and Face - Normocephalic and atraumatic, with no gross asymmetry noted.  The facial nerve is intact, with strong symmetric movements.  Cranial nerves 2-12 grossly intact  Voice and Breathing - The patient was breathing comfortably without the use of accessory muscles. There was no wheezing, stridor. The patients voice was clear and strong, and had appropriate pitch and quality.  Ears - The ears were examined with binocular microscopy and with otoscope.  External ears normal. Right canal cerumen impacted.  Left canal patent, cler.  The right ear was cleaned with gentle suctioning.   Right tympanic membrane is intact without effusion, retraction or mass.  Left tympanic membrane is intact without effusion, retraction or mass.  Eyes - Extraocular movements intact, sclera were not icteric or injected, conjunctiva were pink and moist.  Mouth - Examination of the oral cavity showed pink, healthy oral mucosa. Dentition in good condition. No lesions or ulcerations noted. The tongue was mobile and midline.  Pain with palpation to TMJ, significant click/pop noted with opening and closing jaw.  She also has masseter muscle tenderness to palpation  Throat - The walls of the oropharynx were smooth, pink, moist, symmetric, and had no lesions or ulcerations.  The tonsillar pillars and soft palate were symmetric. The uvula was midline on elevation.    Neck - Full range of motion on passive movement.  Palpation of the occipital, submental, " submandibular, internal jugular chain, and supraclavicular nodes did not demonstrate any abnormal lymph nodes or masses.  Palpation of the thyroid was soft and smooth, with no nodules or goiter appreciated.  The trachea was mobile and midline.  Nose - External contour is symmetric, no gross deflection or scars.  Nasal mucosa is pink and moist with no abnormal mucus.  The septum and turbinates were evaluated with nasal speculum, no polyps, masses, or purulence noted on examination.  Left ankle with mild swelling, very tender to palpation.  No bony instability, she is able to weight bear with slight limp         Assessment and Plan:       ICD-10-CM    1. Pain in joint involving ankle and foot, left  M25.572    2. Acute left ankle pain  M25.572 CANCELED: XR ANKLE LEFT 2 VW (Clinic Performed)   3. Impacted cerumen of right ear  H61.21    4. Anxiety  F41.9    5. Light-headed feeling  R42      Ears are cleaned and both look healthy  Keep scheduled appointment for physical therapy  I will order an audiogram for you at [facility]  No fracture noted on ankle x-ray, rest, ice, elevate, ACE wrap. Take ibuprofen or tylenol per package dosing for pain    Follow up with Bulmaro Marcelino for anxiety and recheck of ankle    Rest the muscles of the jaw by eating soft foods, avoid chewing gum, avoid clenching or tensing the jaw  Apply moist warm heat to the affected jaw for 30 minutes at least twice daily  OTC (such as ibuprofen or aleve) NSAIDs per package directions  Oral splints or mouth guards as sometimes necessary  If these steps are not helpful an oral surgery consult may be needed      Orin Dumont NP-C  [facility]

## 2020-10-16 NOTE — LETTER
"    10/16/2020         RE: Meg Diallo  [address]  [facility] [address]        Dear Colleague,    Thank you for referring your patient, Meg Diallo, to the [facility]. Please see a copy of my visit note below.    Otolaryngology Note         Chief Complaint:     Patient presents with:  Cerumen Impaction: excessive cerumen, possible ear infection           History of Present Illness:     Meg Diallo is a 20 year old female who presents today for ear cleaning.  She was recently seen by Bulmaro Marcelino on 10/2/2020 for otalgia and left OM.  She was treated with Augmentin, she has 2 pills to complete.  She has some improvement with the abx.     She denies kimo drainage.  She feels her hearing is decreased bilaterally, ears fell plugged.  She can hear popping when she speaks and swallows.  She feels pressure in her ears.   She has not been taking any AH or nasal sprays.      + tinnitus, intermittent over time.   She hears it about 2 times per week  She fell yesterday at home, twisted her left ankle.  She has had some trouble with walking on it.  She took tylenol yesterday without improvement.  She iced yesterday.  She has also been elevating, but her SO who presents with her today states that \"she is always on her feet\"     She feels dizziness when she stands up and in the shower.  She feels light-headed and cannot balance.  She has been feeling this sensation for a long time.    She drinks 32-64 ounces of water per day    No kimo rotary vertigo, facial numbness or weakness.      She denies hx of COM or otologic surgeries.   She has recurrent pain in the ears she feels due to TMJ.  She was referred to PT for TMJ  She admits she grinds and clenches at times  She has not seen a dentist recently  She has occasional nasal congestion, mostly at night.  She has seasonal allergies, worse in spring and summer.   She does not currently take anything for symptoms   No history of allergy " testing.    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    She admits to significant anxiety, was on Celexa in the past, would like to start back on celexa.      She also reports left ankle pain after fall yesterday, the pain is medial and superior to the medial malleolus.  Has been able to walk.  No consistent numbness or tingling.  She has not taken anything today. She is requesting ankle x-ray           Medications:     Current Outpatient Rx   Medication Sig Dispense Refill     albuterol (PROAIR HFA/PROVENTIL HFA/VENTOLIN HFA) 108 (90 Base) MCG/ACT inhaler Inhale 2 puffs into the lungs every 6 hours as needed for shortness of breath / dyspnea or wheezing 1 Inhaler 3     amoxicillin-clavulanate (AUGMENTIN) 875-125 MG tablet Take 1 tablet by mouth 2 times daily 20 tablet 0     budesonide-formoterol (SYMBICORT) 80-4.5 MCG/ACT Inhaler Inhale 2 puffs into the lungs 2 times daily 3 Inhaler 1     norethindrone (MICRONOR) 0.35 MG tablet Take 1 tablet (0.35 mg) by mouth 2 times daily (with meals) Begin these new pills when completes current packet of old pills. 56 tablet 11     methylcellulose (CITRUCEL) 500 MG TABS tablet Take 2 tablets (1,000 mg) by mouth daily (Patient not taking: Reported on 9/11/2020) 180 tablet 0            Allergies:     Allergies: Lexapro [escitalopram]          Past Medical History:     Past Medical History:   Diagnosis Date     Attention deficit disorder of childhood without me 03/05/2007     Depressive disorder      Facial hemangioma 07/23/2012     Hemangioma of skin and subcutaneous tissue 2000     Nasal injury 07/23/2012     Nasal turbinate hypertrophy 07/23/2012     Tonsillar hypertrophy 07/23/2012            Past Surgical History:     Past Surgical History:   Procedure Laterality Date     HEAD & NECK SURGERY      laser on face     LAPAROSCOPIC CYSTECTOMY OVARIAN (ONCOLOGY) Left 8/22/2019    Procedure: LAPAROSCOPY TREAT LEFT OVARIAN CYST, MULTIPLE ASPIRATIONS LEFT  "OVARIAN CYST;  Surgeon: Frow, David Franke, MD;  Location: [facility]     lazer surgery birthmark to left side of face              Social History:     Social History     Tobacco Use     Smoking status: Never Smoker     Smokeless tobacco: Never Used   Substance Use Topics     Alcohol use: No     Drug use: No            Review of Systems:     ROS: See HPI         Physical Exam:     /66   Pulse 80   Temp 97.5  F (36.4  C) (Tympanic)   Resp 16   Ht 1.6 m (5' 3\")   Wt 83.7 kg (184 lb 9.6 oz)   SpO2 99%   BMI 32.70 kg/m      General - The patient is well nourished and well developed, and appears to have good nutritional status.  Alert and oriented to person and place, answers questions and cooperates with examination appropriately.   Head and Face - Normocephalic and atraumatic, with no gross asymmetry noted.  The facial nerve is intact, with strong symmetric movements.  Cranial nerves 2-12 grossly intact  Voice and Breathing - The patient was breathing comfortably without the use of accessory muscles. There was no wheezing, stridor. The patients voice was clear and strong, and had appropriate pitch and quality.  Ears - The ears were examined with binocular microscopy and with otoscope.  External ears normal. Right canal cerumen impacted.  Left canal patent, cler.  The right ear was cleaned with gentle suctioning.   Right tympanic membrane is intact without effusion, retraction or mass.  Left tympanic membrane is intact without effusion, retraction or mass.  Eyes - Extraocular movements intact, sclera were not icteric or injected, conjunctiva were pink and moist.  Mouth - Examination of the oral cavity showed pink, healthy oral mucosa. Dentition in good condition. No lesions or ulcerations noted. The tongue was mobile and midline.  Pain with palpation to TMJ, significant click/pop noted with opening and closing jaw.  She also has masseter muscle tenderness to palpation  Throat - The walls of the oropharynx were " smooth, pink, moist, symmetric, and had no lesions or ulcerations.  The tonsillar pillars and soft palate were symmetric. The uvula was midline on elevation.    Neck - Full range of motion on passive movement.  Palpation of the occipital, submental, submandibular, internal jugular chain, and supraclavicular nodes did not demonstrate any abnormal lymph nodes or masses.  Palpation of the thyroid was soft and smooth, with no nodules or goiter appreciated.  The trachea was mobile and midline.  Nose - External contour is symmetric, no gross deflection or scars.  Nasal mucosa is pink and moist with no abnormal mucus.  The septum and turbinates were evaluated with nasal speculum, no polyps, masses, or purulence noted on examination.  Left ankle with mild swelling, very tender to palpation.  No bony instability, she is able to weight bear with slight limp         Assessment and Plan:       ICD-10-CM    1. Pain in joint involving ankle and foot, left  M25.572    2. Acute left ankle pain  M25.572 CANCELED: XR ANKLE LEFT 2 VW (Clinic Performed)   3. Impacted cerumen of right ear  H61.21    4. Anxiety  F41.9    5. Light-headed feeling  R42      Ears are cleaned and both look healthy  Keep scheduled appointment for physical therapy  I will order an audiogram for you at [facility]  No fracture noted on ankle x-ray, rest, ice, elevate, ACE wrap. Take ibuprofen or tylenol per package dosing for pain    Follow up with Bulmaro Marcelino for anxiety and recheck of ankle    Rest the muscles of the jaw by eating soft foods, avoid chewing gum, avoid clenching or tensing the jaw  Apply moist warm heat to the affected jaw for 30 minutes at least twice daily  OTC (such as ibuprofen or aleve) NSAIDs per package directions  Oral splints or mouth guards as sometimes necessary  If these steps are not helpful an oral surgery consult may be needed      Orin Dumont NP-C  [facility]        Again, thank you for allowing me to  participate in the care of your patient.        Sincerely,        Orin Dumont, NP

## 2020-10-16 NOTE — PATIENT INSTRUCTIONS
Thank you for allowing Orin Dumont NP and our ENT team to participate in your care.  If your medications are too expensive, please give the nurse a call.  We can possibly change this medication.  If you have a scheduling or an appointment question please contact our Health Unit Coordinator at their direct line [phone].   ALL nursing questions or concerns can be directed to your ENT nurse at: [phone] - Sharon  Ears are cleaned and both look healthy  Keep scheduled appointment for physical therapy  I will order an audiogram for you at [facility]  No fracture noted on ankle x-ray, rest, ice, elevate, ACE wrap. Take ibuprofen or tylenol per package dosing for pain    Follow up with Bulmaro Marcelino for anxiety and recheck of ankle    Rest the muscles of the jaw by eating soft foods, avoid chewing gum, avoid clenching or tensing the jaw  Apply moist warm heat to the affected jaw for 30 minutes at least twice daily  OTC (such as ibuprofen or aleve) NSAIDs per package directions  Oral splints or mouth guards as sometimes necessary  If these steps are not helpful an oral surgery consult may be needed        Thank you for allowing Orin FIORE and our ENT team to participate in your care.  If your medications are too expensive, please call my nurse at the number listed below.  We can possibly change this medication.    If you have a scheduling or an appointment question please contact our Health Unit Coordinator at their direct line [phone].   ALL nursing questions or concerns can be directed to your ENT nurses at: [phone] or [phone].

## 2020-10-20 ENCOUNTER — HOSPITAL ENCOUNTER (OUTPATIENT)
Dept: PHYSICAL THERAPY | Facility: OTHER | Age: 20
Discharge: HOME | End: 2020-10-20
Attending: NURSE PRACTITIONER | Admitting: NURSE PRACTITIONER
Payer: COMMERCIAL

## 2020-10-20 DIAGNOSIS — M26.609 TMJ (TEMPOROMANDIBULAR JOINT SYNDROME): ICD-10-CM

## 2020-10-20 PROCEDURE — 97110 THERAPEUTIC EXERCISES: CPT | Mod: GP

## 2020-10-20 PROCEDURE — 97162 PT EVAL MOD COMPLEX 30 MIN: CPT | Mod: GP

## 2020-10-20 NOTE — PROGRESS NOTES
"   10/20/20 1250   General Information   Type of Visit Initial OP Ortho PT Evaluation   Start of Care Date 10/20/20   Referring Physician Bulmaro Marcelino NP   Patient/Family Goals Statement Decrease neck and jaw pain   Orders Evaluate and Treat   Date of Order 10/02/20   Certification Required? Yes   Medical Diagnosis TMJ syndrome   Surgical/Medical history reviewed Yes   Precautions/Limitations no known precautions/limitations   General Information Comments PMH - see chart (tinnitus, depression, history of MRSA, anxiety, mild intellectual disabilities)   Body Part(s)   Body Part(s) TMJ;Cervical Spine   Presentation and Etiology   Pertinent history of current problem (include personal factors and/or comorbidities that impact the POC) Patient reports a longstanding history of jaw/cheek pain.  She states she has had this since she was a child she reports this pain seems to have worsened in the past year however.  She is not had any previous treatment for this.  She was seen by her nurse practitioner and is now referred to physical therapy.  She was also seen by ENT and reports that her ears were \"cleaned \"she reports being up-to-date on her dental appointments.  She is not wearing any type of mouth guards, but does think maybe she grinds her teeth.   Impairments A. Pain;D. Decreased ROM;E. Decreased flexibility;N. Headaches;M. Locking or catching   Functional Limitations perform activities of daily living;perform desired leisure / sports activities   Symptom Location Diffuse pain complaints along the bilateral jaw, cheek, anterior lateral and posterior neck   How/Where did it occur From insidious onset   Chronicity Chronic   Pain rating (0-10 point scale) Best (/10);Worst (/10)   Best (/10) 3   Worst (/10) 10   Pain quality A. Sharp;C. Aching;G. Cramping   Frequency of pain/symptoms A. Constant   Pain/symptoms are: The same all the time   Pain/symptoms exacerbated by G. Certain positions;J. ADL;M. Other   Pain " exacerbation comment Eating, opening and closing her jaw, sleeping   Pain/symptoms eased by I. OTC medication(s)   Progression of symptoms since onset: Worsened   Current Level of Function   Patient role/employment history B. Student   Fall Risk Screen   Fall screen completed by PT   Have you fallen 2 or more times in the past year? No   Have you fallen and had an injury in the past year? Yes   Is patient a fall risk? No   Cervical Spine   Posture Mild forward head rounded shoulder posture   Cervical Flexion ROM 2 inches chin to chest with general neck stretching   Cervical Extension ROM Within normal limits and pain-free   Cervical Right Side Bending ROM 0 to 27 degrees with contralateral pull   Cervical Left Side Bending ROM 0 to 31 degrees with contralateral pull   Cervical Right Rotation ROM 75% comfortable rotation with contralateral stretching   Cervical Left Rotation ROM Within normal limits with contralateral stretching   Shoulder AROM Screen Within normal limits bilaterally   Upper Trapezius Flexibility Decreased bilaterally   Levator Scapula Flexibility Decreased bilaterally   Scalene Flexibility Decreased bilaterally   Pectoralis Minor Flexibility Decreased bilaterally   Vertebral Artery Test Negative   Alar Ligament Test Negative   Transverse Ligament Test Negative   Segmental Mobility-Cervical No mechanical dysfunctions found   Palpation Soft tissue tension and tenderness to palpation along the bilateral scalenes, upper trapezius, and levator scapular muscles   TMJ Objective Findings   Observation No acute distress   Joint lock Open   Pain Chewing;Talking;Yawning;Clenching;Opening   Associated symptoms Earache;Cheek pain;Tinnitus;Stress   Headache Cervical or muscle contraction   Habits Bruxism;Nail biting;Clenching   Difficulty swallowing No   Intraoral mouth appliance No   Major dental work No   Tired or painful jaw muscles Yes   Opening click Yes   Closing click No   TMJ ROM Comments Positive  tenderness to the bilateral masseter muscles   Palpation   (Tenderness bilateral TMJ.)   Open Intermittent feeling of locking   Planned Therapy Interventions   Planned Therapy Interventions joint mobilization;manual therapy;ROM;stretching;strengthening  (Postural education)   Planned Modality Interventions   Planned Modality Interventions Comments Modalities as indicated   Clinical Impression   Criteria for Skilled Therapeutic Interventions Met yes, treatment indicated   PT Diagnosis Bilateral TMJ and neck pain   Influenced by the following impairments Pain, decreased mobility, headaches   Functional limitations due to impairments Eating, opening closing mouth, ADLs, sleeping   Clinical Presentation Evolving/Changing   Clinical Presentation Rationale Pain and functional limitations have progressed   Clinical Decision Making (Complexity) Moderate complexity   Therapy Frequency 2 times/Week   Predicted Duration of Therapy Intervention (days/wks) 4 weeks   Risk & Benefits of therapy have been explained Yes   Patient, Family & other staff in agreement with plan of care Yes   Clinical Impression Comments Patient presents with bilateral TMJ/jaw and neck pain with significant soft tissue restrictions and limited mobility   Education Assessment   Barriers to Learning No barriers   ORTHO GOALS   PT Ortho Eval Goals 1;2;3   Ortho Goal 1   Goal Identifier LTG 1   Goal Description Decreased pain when at its worst to an 8/10   Target Date 11/03/20   Ortho Goal 2   Goal Identifier LTG 1   Goal Description She will demonstrate independence with home exercise program   Target Date 11/17/20   Ortho Goal 3   Goal Identifier LTG 2   Goal Description She will be put to sleep with disruption of less than 1 hour due to pain   Target Date 11/17/20   Total Evaluation Time   PT Sushma, Moderate Complexity Minutes (85672) 25   Therapy Certification   Certification date from 10/20/20   Certification date to 01/20/21   Medical Diagnosis TMJ  syndrome       I certify the need for these services furnished under this plan of treatment and while under my care. (Physician co-signature of this document indicates review and certification of the therapy plan).

## 2020-11-11 ENCOUNTER — HOSPITAL ENCOUNTER (OUTPATIENT)
Dept: PHYSICAL THERAPY | Facility: OTHER | Age: 20
Discharge: HOME | End: 2020-11-11
Attending: NURSE PRACTITIONER | Admitting: NURSE PRACTITIONER
Payer: COMMERCIAL

## 2020-11-11 PROCEDURE — 97110 THERAPEUTIC EXERCISES: CPT | Mod: GP | Performed by: PHYSICAL THERAPIST

## 2020-11-11 PROCEDURE — 97035 APP MDLTY 1+ULTRASOUND EA 15: CPT | Mod: GP | Performed by: PHYSICAL THERAPIST

## 2020-12-15 NOTE — PROGRESS NOTES
Outpatient Physical Therapy Discharge Note     Patient: Meg Diallo  : 2000    Beginning/End Dates of Reporting Period:  10/20/2020 to 12/15/2020    Referring Provider: Bulmaro Marcelino NP    Therapy Diagnosis: TMJ syndrome     Client Self Report: Reports she has been doing the exericses, but her neck and jaw remain sore    Objective Measurements:                                          Outcome Measures (most recent score):      Goals:  Goal Identifier     Goal Description     Target Date     Date Met      Progress:     Goal Identifier     Goal Description     Target Date     Date Met      Progress:     Goal Identifier     Goal Description     Target Date     Date Met      Progress:     Goal Identifier     Goal Description     Target Date     Date Met      Progress:     Goal Identifier     Goal Description     Target Date     Date Met      Progress:     Goal Identifier     Goal Description     Target Date     Date Met      Progress:     Goal Identifier     Goal Description     Target Date     Date Met      Progress:     Goal Identifier     Goal Description     Target Date     Date Met      Progress:     Progress Toward Goals:   Not assessed this period.          Plan:  Discharge from therapy.    Discharge:    Reason for Discharge: Pt no showed or cancelled her last 7 PT sessions    Equipment Issued: none    Discharge Plan: Patient to continue home program.

## 2020-12-25 ENCOUNTER — TRANSFERRED RECORDS (OUTPATIENT)
Dept: HEALTH INFORMATION MANAGEMENT | Facility: CLINIC | Age: 20
End: 2020-12-25

## 2020-12-25 LAB
ALT SERPL-CCNC: 14 IU/L (ref 6–31)
AST SERPL-CCNC: 19 IU/L (ref 10–40)
CREAT SERPL-MCNC: 0.81 MG/DL (ref 0.4–1)
GFR SERPL CREATININE-BSD FRML MDRD: >60 ML/MIN/1.73M2
GLUCOSE SERPL-MCNC: 93 MG/DL (ref 70–99)
POTASSIUM SERPL-SCNC: 3.6 MEQ/L (ref 3.4–5.1)

## 2021-01-05 NOTE — PROGRESS NOTES
"  Assessment & Plan     1. Infection of parotid gland  Sour drops  - clindamycin (CLEOCIN) 300 MG capsule; Take 1 capsule (300 mg) by mouth 3 times daily for 10 days  Dispense: 30 capsule; Refill: 0    2. Hypoglycemia  CGMS  - DIABETES EDUCATION REFERRAL ([place])    3. Menorrhagia with irregular cycle  - OB/GYN REFERRAL  - CBC with platelets and differential      Review of external notes as documented above - ED and ob/gyn.             BMI:   Estimated body mass index is 32.42 kg/m  as calculated from the following:    Height as of this encounter: 1.6 m (5' 3\").    Weight as of this encounter: 83 kg (183 lb).   Weight management plan: Discussed healthy diet and exercise guidelines          Follow-up as needed    Tiara Hidalgo NP  [facility] [place] [facility]    Subjective     Meg Diallo is a 21 year old who presents to clinic today for the following health issues     HPI       Concern - jaw line lump  Onset: after December 9th,  Began after wisdom tooth extraction by Dr Garcia oral surgery.    Description: lump on jaw line can not be felt inside mouth near the area of wisdom tooth extraction  Intensity: moderate  Progression of Symptoms:  same  Accompanying Signs & Symptoms: pain and swelling   Previous history of similar problem: none  Precipitating factors:        Worsened by:  Palpitation, chewing  Alleviating factors:        Improved by: none  Therapies tried and outcome:  none     She had been following with ob for irregular heavy periods, her current OCP is not helping as she has been bleeding for three months.  States some days are heavier that others, usually 2-3 pads per day.     She was in the ED on Black day due to head injury from a fall, glucose was low and was diagnosed with hypoglycemia.  States she has \"episodes\" randomly - will feel weak, light headed will then eat something and will feel better.      Patient Active Problem List   Diagnosis     Tinnitus     Complex cyst " of right ovary--2.5cm-Ruptured,seen ER--1/2020     Major depressive disorder, recurrent episode, moderate (H)     H/O methicillin resistant Staphylococcus aureus infection     Anxiety state     Bipolar disorder, in full remission, most recent episode depressed (H)     Mild intellectual disabilities     Encounter for initial prescription of implantable subdermal contraceptive     Pain in joint, ankle and foot, unspecified laterality     Complex cyst of LEFT OVARY 5cm on US---8/2019, Laparoscopy drainage--8/2019     Left lower quadrant pain--US 5cm Complex LEFT OVARIAN CYST---8/2019, Laparoscopy drainage-SIMPLE CYST---8/2019     S/P LAPAROSCOPY w Drainage LEFT OVARIAN CYST 5cm---8/22/2019     Nexplanon removal     Cyst of left ovary--CT, US  4cm w internal echos--6/5/20     Past Surgical History:   Procedure Laterality Date     HEAD & NECK SURGERY      laser on face     LAPAROSCOPIC CYSTECTOMY OVARIAN (ONCOLOGY) Left 8/22/2019    Procedure: LAPAROSCOPY TREAT LEFT OVARIAN CYST, MULTIPLE ASPIRATIONS LEFT OVARIAN CYST;  Surgeon: Frow, David Franke, MD;  Location: [facility] birthmark to left side of face         Social History     Tobacco Use     Smoking status: Never Smoker     Smokeless tobacco: Never Used   Substance Use Topics     Alcohol use: No     Family History   Problem Relation Age of Onset     Bipolar Disorder Mother      Depression Mother      Heart Disease Father      Diabetes Other      Myocardial Infarction Paternal Grandfather      Lung Cancer Paternal Grandfather      Other - See Comments Paternal Aunt         mulitiple sclerosis     Breast Cancer Maternal Grandfather         x 2         Current Outpatient Medications   Medication Sig Dispense Refill     albuterol (PROAIR HFA/PROVENTIL HFA/VENTOLIN HFA) 108 (90 Base) MCG/ACT inhaler Inhale 2 puffs into the lungs every 6 hours as needed for shortness of breath / dyspnea or wheezing 1 Inhaler 3     clindamycin (CLEOCIN) 300 MG capsule Take 1  "capsule (300 mg) by mouth 3 times daily for 10 days 30 capsule 0     hyoscyamine (LEVSIN) 0.125 MG tablet TAKE 1 TABLET BY MOUTH EVERY 4 TO 6 HOURS AS NEEDED FOR ABDOMINAL PAIN       norethindrone (MICRONOR) 0.35 MG tablet Take 1 tablet (0.35 mg) by mouth 2 times daily (with meals) Begin these new pills when completes current packet of old pills. 56 tablet 11     budesonide-formoterol (SYMBICORT) 80-4.5 MCG/ACT Inhaler Inhale 2 puffs into the lungs 2 times daily 3 Inhaler 1     chlorhexidine (PERIDEX) 0.12 % solution SWISH AND SPIT 15 ML THREE TIMES A DAY       Allergies   Allergen Reactions     Lexapro [Escitalopram] Other (See Comments)     hypersensitivity to touch      Recent Labs   Lab Test 08/21/20 06/03/20  1047 03/03/20 05/08/18  1027 05/08/18  1027 06/22/17  2357 11/20/16  1146 11/20/16  1146   ALT 12 24 22   < >  --   --    < > 21   CR 0.73 0.68 0.83   < >  --  0.41*   < > 0.64   GFRESTIMATED >60 >90 >60   < >  --  >90  Non  GFR Calc     < > >90  Non  GFR Calc     GFRESTBLACK  --  >90  --   --   --  >90  African American GFR Calc     < > >90   GFR Calc     POTASSIUM 3.3* 3.6 4.0   < >  --  3.8   < > 3.5   TSH  --   --   --   --  2.26  --   --  1.38    < > = values in this interval not displayed.      BP Readings from Last 3 Encounters:   01/06/21 124/68   10/16/20 102/66   10/02/20 110/66    Wt Readings from Last 3 Encounters:   01/06/21 83 kg (183 lb)   10/16/20 83.7 kg (184 lb 9.6 oz)   10/02/20 82.3 kg (181 lb 8 oz)                      Review of Systems   Constitutional, HEENT, cardiovascular, pulmonary, gi and gu systems are negative, except as otherwise noted.      Objective    /68 (BP Location: Right arm, Patient Position: Chair, Cuff Size: Adult Regular)   Pulse 75   Temp 97.4  F (36.3  C) (Tympanic)   Ht 1.6 m (5' 3\")   Wt 83 kg (183 lb)   SpO2 99%   BMI 32.42 kg/m    Body mass index is 32.42 kg/m .  Physical Exam   GENERAL: healthy, " alert and no distress  HENT: normal cephalic/atraumatic, both ears: normal, nose and mouth without ulcers or lesions no swelling of gum line, parotid glad of right side is enlarged, very tender with palpation.   NECK: no adenopathy, no asymmetry, masses, or scars and thyroid normal to palpation  RESP: lungs clear to auscultation - no rales, rhonchi or wheezes  CV: regular rate and rhythm, normal S1 S2, no S3 or S4, no murmur, click or rub, no peripheral edema and peripheral pulses strong  MS: no gross musculoskeletal defects noted, no edema  PSYCH: mentation appears normal, affect normal/bright    Results for orders placed or performed in visit on 01/06/21   CBC with platelets and differential     Status: None   Result Value Ref Range    WBC 6.4 4.0 - 11.0 10e9/L    RBC Count 4.45 3.8 - 5.2 10e12/L    Hemoglobin 12.8 11.7 - 15.7 g/dL    Hematocrit 38.0 35.0 - 47.0 %    MCV 85 78 - 100 fl    MCH 28.8 26.5 - 33.0 pg    MCHC 33.7 31.5 - 36.5 g/dL    RDW 13.1 10.0 - 15.0 %    Platelet Count 328 150 - 450 10e9/L    % Neutrophils 50.8 %    % Lymphocytes 34.9 %    % Monocytes 8.6 %    % Eosinophils 5.2 %    % Basophils 0.5 %    Absolute Neutrophil 3.2 1.6 - 8.3 10e9/L    Absolute Lymphocytes 2.2 0.8 - 5.3 10e9/L    Absolute Monocytes 0.6 0.0 - 1.3 10e9/L    Absolute Eosinophils 0.3 0.0 - 0.7 10e9/L    Absolute Basophils 0.0 0.0 - 0.2 10e9/L    Diff Method Automated Method

## 2021-01-06 ENCOUNTER — OFFICE VISIT (OUTPATIENT)
Dept: FAMILY MEDICINE | Facility: OTHER | Age: 21
End: 2021-01-06
Attending: NURSE PRACTITIONER
Payer: COMMERCIAL

## 2021-01-06 VITALS
HEART RATE: 75 BPM | OXYGEN SATURATION: 99 % | DIASTOLIC BLOOD PRESSURE: 68 MMHG | HEIGHT: 63 IN | SYSTOLIC BLOOD PRESSURE: 124 MMHG | WEIGHT: 183 LBS | TEMPERATURE: 97.4 F | BODY MASS INDEX: 32.43 KG/M2

## 2021-01-06 DIAGNOSIS — N92.1 MENORRHAGIA WITH IRREGULAR CYCLE: ICD-10-CM

## 2021-01-06 DIAGNOSIS — E16.2 HYPOGLYCEMIA: ICD-10-CM

## 2021-01-06 DIAGNOSIS — K11.20 INFECTION OF PAROTID GLAND: Primary | ICD-10-CM

## 2021-01-06 LAB
BASOPHILS # BLD AUTO: 0 10E9/L (ref 0–0.2)
BASOPHILS NFR BLD AUTO: 0.5 %
DIFFERENTIAL METHOD BLD: NORMAL
EOSINOPHIL # BLD AUTO: 0.3 10E9/L (ref 0–0.7)
EOSINOPHIL NFR BLD AUTO: 5.2 %
ERYTHROCYTE [DISTWIDTH] IN BLOOD BY AUTOMATED COUNT: 13.1 % (ref 10–15)
HCT VFR BLD AUTO: 38 % (ref 35–47)
HGB BLD-MCNC: 12.8 G/DL (ref 11.7–15.7)
LYMPHOCYTES # BLD AUTO: 2.2 10E9/L (ref 0.8–5.3)
LYMPHOCYTES NFR BLD AUTO: 34.9 %
MCH RBC QN AUTO: 28.8 PG (ref 26.5–33)
MCHC RBC AUTO-ENTMCNC: 33.7 G/DL (ref 31.5–36.5)
MCV RBC AUTO: 85 FL (ref 78–100)
MONOCYTES # BLD AUTO: 0.6 10E9/L (ref 0–1.3)
MONOCYTES NFR BLD AUTO: 8.6 %
NEUTROPHILS # BLD AUTO: 3.2 10E9/L (ref 1.6–8.3)
NEUTROPHILS NFR BLD AUTO: 50.8 %
PLATELET # BLD AUTO: 328 10E9/L (ref 150–450)
RBC # BLD AUTO: 4.45 10E12/L (ref 3.8–5.2)
WBC # BLD AUTO: 6.4 10E9/L (ref 4–11)

## 2021-01-06 PROCEDURE — 85025 COMPLETE CBC W/AUTO DIFF WBC: CPT | Mod: ZL | Performed by: NURSE PRACTITIONER

## 2021-01-06 PROCEDURE — 36415 COLL VENOUS BLD VENIPUNCTURE: CPT | Mod: ZL | Performed by: NURSE PRACTITIONER

## 2021-01-06 PROCEDURE — G0463 HOSPITAL OUTPT CLINIC VISIT: HCPCS

## 2021-01-06 PROCEDURE — 99214 OFFICE O/P EST MOD 30 MIN: CPT | Performed by: NURSE PRACTITIONER

## 2021-01-06 RX ORDER — CHLORHEXIDINE GLUCONATE ORAL RINSE 1.2 MG/ML
SOLUTION DENTAL
COMMUNITY
Start: 2020-12-09 | End: 2021-01-19

## 2021-01-06 RX ORDER — CLINDAMYCIN HCL 300 MG
300 CAPSULE ORAL 3 TIMES DAILY
Qty: 30 CAPSULE | Refills: 0 | Status: SHIPPED | OUTPATIENT
Start: 2021-01-06 | End: 2021-01-16

## 2021-01-06 RX ORDER — HYOSCYAMINE SULFATE 0.125 MG
TABLET ORAL
COMMUNITY
Start: 2020-07-24 | End: 2021-04-22

## 2021-01-06 ASSESSMENT — PATIENT HEALTH QUESTIONNAIRE - PHQ9: SUM OF ALL RESPONSES TO PHQ QUESTIONS 1-9: 19

## 2021-01-06 ASSESSMENT — ANXIETY QUESTIONNAIRES
4. TROUBLE RELAXING: NEARLY EVERY DAY
2. NOT BEING ABLE TO STOP OR CONTROL WORRYING: NEARLY EVERY DAY
6. BECOMING EASILY ANNOYED OR IRRITABLE: NEARLY EVERY DAY
3. WORRYING TOO MUCH ABOUT DIFFERENT THINGS: NEARLY EVERY DAY
1. FEELING NERVOUS, ANXIOUS, OR ON EDGE: NEARLY EVERY DAY
GAD7 TOTAL SCORE: 21
5. BEING SO RESTLESS THAT IT IS HARD TO SIT STILL: NEARLY EVERY DAY
IF YOU CHECKED OFF ANY PROBLEMS ON THIS QUESTIONNAIRE, HOW DIFFICULT HAVE THESE PROBLEMS MADE IT FOR YOU TO DO YOUR WORK, TAKE CARE OF THINGS AT HOME, OR GET ALONG WITH OTHER PEOPLE: EXTREMELY DIFFICULT
7. FEELING AFRAID AS IF SOMETHING AWFUL MIGHT HAPPEN: NEARLY EVERY DAY

## 2021-01-06 ASSESSMENT — PAIN SCALES - GENERAL: PAINLEVEL: SEVERE PAIN (6)

## 2021-01-06 ASSESSMENT — MIFFLIN-ST. JEOR: SCORE: 1564.21

## 2021-01-06 NOTE — NURSING NOTE
"Chief Complaint   Patient presents with     Mouth Lesions       Initial /68 (BP Location: Right arm, Patient Position: Chair, Cuff Size: Adult Regular)   Pulse 75   Temp 97.4  F (36.3  C) (Tympanic)   Ht 1.6 m (5' 3\")   Wt 83 kg (183 lb)   SpO2 99%   BMI 32.42 kg/m   Estimated body mass index is 32.42 kg/m  as calculated from the following:    Height as of this encounter: 1.6 m (5' 3\").    Weight as of this encounter: 83 kg (183 lb).  Medication Reconciliation: complete  Sophy Bautista LPN    "

## 2021-01-06 NOTE — PATIENT INSTRUCTIONS
"  Assessment & Plan     1. Infection of parotid gland  Sour drops  - clindamycin (CLEOCIN) 300 MG capsule; Take 1 capsule (300 mg) by mouth 3 times daily for 10 days  Dispense: 30 capsule; Refill: 0    2. Hypoglycemia  CGMS  - DIABETES EDUCATION REFERRAL ([place])    3. Menorrhagia with irregular cycle  - OB/GYN REFERRAL  - CBC with platelets and differential      Review of external notes as documented above            BMI:   Estimated body mass index is 32.42 kg/m  as calculated from the following:    Height as of this encounter: 1.6 m (5' 3\").    Weight as of this encounter: 83 kg (183 lb).   Weight management plan: Discussed healthy diet and exercise guidelines          Follow-up as needed    Tiara Hidalgo, NP  [facility]    "

## 2021-01-07 ASSESSMENT — ANXIETY QUESTIONNAIRES: GAD7 TOTAL SCORE: 21

## 2021-01-19 ENCOUNTER — OFFICE VISIT (OUTPATIENT)
Dept: OBGYN | Facility: OTHER | Age: 21
End: 2021-01-19
Attending: NURSE PRACTITIONER
Payer: COMMERCIAL

## 2021-01-19 VITALS
SYSTOLIC BLOOD PRESSURE: 110 MMHG | HEART RATE: 93 BPM | BODY MASS INDEX: 32.07 KG/M2 | RESPIRATION RATE: 14 BRPM | OXYGEN SATURATION: 99 % | TEMPERATURE: 98.5 F | DIASTOLIC BLOOD PRESSURE: 66 MMHG | WEIGHT: 181 LBS | HEIGHT: 63 IN

## 2021-01-19 DIAGNOSIS — N92.1 MENORRHAGIA WITH IRREGULAR CYCLE: ICD-10-CM

## 2021-01-19 DIAGNOSIS — Z30.09 ENCOUNTER FOR COUNSELING REGARDING CONTRACEPTION: ICD-10-CM

## 2021-01-19 DIAGNOSIS — Z11.3 SCREEN FOR STD (SEXUALLY TRANSMITTED DISEASE): ICD-10-CM

## 2021-01-19 DIAGNOSIS — Z86.69 HX OF MIGRAINES: ICD-10-CM

## 2021-01-19 DIAGNOSIS — Z87.42 HX OF OVARIAN CYST: Primary | ICD-10-CM

## 2021-01-19 PROBLEM — Z30.017 ENCOUNTER FOR INITIAL PRESCRIPTION OF IMPLANTABLE SUBDERMAL CONTRACEPTIVE: Status: RESOLVED | Noted: 2018-11-06 | Resolved: 2021-01-19

## 2021-01-19 PROBLEM — M25.579 PAIN IN JOINT, ANKLE AND FOOT, UNSPECIFIED LATERALITY: Status: RESOLVED | Noted: 2018-11-06 | Resolved: 2021-01-19

## 2021-01-19 PROBLEM — N83.292 COMPLEX CYST OF LEFT OVARY: Status: RESOLVED | Noted: 2019-08-20 | Resolved: 2021-01-19

## 2021-01-19 PROBLEM — Z98.890 S/P LAPAROSCOPY: Status: RESOLVED | Noted: 2019-08-23 | Resolved: 2021-01-19

## 2021-01-19 PROBLEM — Z30.46 NEXPLANON REMOVAL: Status: RESOLVED | Noted: 2020-02-05 | Resolved: 2021-01-19

## 2021-01-19 PROBLEM — N83.202 CYST OF LEFT OVARY: Status: RESOLVED | Noted: 2020-06-15 | Resolved: 2021-01-19

## 2021-01-19 PROBLEM — R10.32 LEFT LOWER QUADRANT PAIN: Status: RESOLVED | Noted: 2019-08-20 | Resolved: 2021-01-19

## 2021-01-19 LAB
B-HCG SERPL-ACNC: <1 IU/L (ref 0–5)
EST. AVERAGE GLUCOSE BLD GHB EST-MCNC: 100 MG/DL
GLUCOSE SERPL-MCNC: 80 MG/DL (ref 70–99)
HBA1C MFR BLD: 5.1 % (ref 0–5.6)
HCG UR QL: NEGATIVE
SPECIMEN SOURCE: NORMAL
TSH SERPL DL<=0.005 MIU/L-ACNC: 1.46 MU/L (ref 0.4–4)
WET PREP SPEC: NORMAL

## 2021-01-19 PROCEDURE — 84702 CHORIONIC GONADOTROPIN TEST: CPT | Mod: ZL | Performed by: ADVANCED PRACTICE MIDWIFE

## 2021-01-19 PROCEDURE — 82947 ASSAY GLUCOSE BLOOD QUANT: CPT | Mod: ZL | Performed by: ADVANCED PRACTICE MIDWIFE

## 2021-01-19 PROCEDURE — 87491 CHLMYD TRACH DNA AMP PROBE: CPT | Mod: ZL | Performed by: ADVANCED PRACTICE MIDWIFE

## 2021-01-19 PROCEDURE — 82627 DEHYDROEPIANDROSTERONE: CPT | Mod: ZL | Performed by: ADVANCED PRACTICE MIDWIFE

## 2021-01-19 PROCEDURE — 84403 ASSAY OF TOTAL TESTOSTERONE: CPT | Mod: ZL | Performed by: ADVANCED PRACTICE MIDWIFE

## 2021-01-19 PROCEDURE — G0463 HOSPITAL OUTPT CLINIC VISIT: HCPCS

## 2021-01-19 PROCEDURE — 99214 OFFICE O/P EST MOD 30 MIN: CPT | Performed by: ADVANCED PRACTICE MIDWIFE

## 2021-01-19 PROCEDURE — 81025 URINE PREGNANCY TEST: CPT | Mod: ZL | Performed by: ADVANCED PRACTICE MIDWIFE

## 2021-01-19 PROCEDURE — 36415 COLL VENOUS BLD VENIPUNCTURE: CPT | Mod: ZL | Performed by: ADVANCED PRACTICE MIDWIFE

## 2021-01-19 PROCEDURE — 999N001182 HC STATISTIC ESTIMATED AVERAGE GLUCOSE: Mod: ZL | Performed by: ADVANCED PRACTICE MIDWIFE

## 2021-01-19 PROCEDURE — 84443 ASSAY THYROID STIM HORMONE: CPT | Mod: ZL | Performed by: ADVANCED PRACTICE MIDWIFE

## 2021-01-19 PROCEDURE — 87210 SMEAR WET MOUNT SALINE/INK: CPT | Mod: ZL | Performed by: ADVANCED PRACTICE MIDWIFE

## 2021-01-19 PROCEDURE — 87591 N.GONORRHOEAE DNA AMP PROB: CPT | Mod: ZL | Performed by: ADVANCED PRACTICE MIDWIFE

## 2021-01-19 PROCEDURE — G0123 SCREEN CERV/VAG THIN LAYER: HCPCS | Mod: ZL | Performed by: ADVANCED PRACTICE MIDWIFE

## 2021-01-19 PROCEDURE — 83036 HEMOGLOBIN GLYCOSYLATED A1C: CPT | Mod: ZL | Performed by: ADVANCED PRACTICE MIDWIFE

## 2021-01-19 PROCEDURE — 83520 IMMUNOASSAY QUANT NOS NONAB: CPT | Mod: ZL | Performed by: ADVANCED PRACTICE MIDWIFE

## 2021-01-19 PROCEDURE — 84146 ASSAY OF PROLACTIN: CPT | Mod: ZL | Performed by: ADVANCED PRACTICE MIDWIFE

## 2021-01-19 PROCEDURE — 84270 ASSAY OF SEX HORMONE GLOBUL: CPT | Mod: ZL | Performed by: ADVANCED PRACTICE MIDWIFE

## 2021-01-19 ASSESSMENT — MIFFLIN-ST. JEOR: SCORE: 1555.14

## 2021-01-19 ASSESSMENT — PAIN SCALES - GENERAL: PAINLEVEL: NO PAIN (0)

## 2021-01-19 NOTE — PROGRESS NOTES
"Meg Diallo is a 21 year old female  Here today for menorrhagia.  G 1 P 1.      Cycle:  Irregular 7-30 days  Bleed:  5 days to 3 months  From spotting to heavy  Pain:  Moderate to severe    Contraception: Micronor a progesterone only pill (POP)    Reports menstrual cycle regular prior to taking a POP    STI screening    Pt inquiring about oophorectomy related to cysts.  Discussed adverse effects including sterilization and menopause.      O:   /66 (BP Location: Left arm, Cuff Size: Adult Regular)   Pulse 93   Temp 98.5  F (36.9  C) (Tympanic)   Resp 14   Ht 1.6 m (5' 3\")   Wt 82.1 kg (181 lb)   LMP 12/28/2020 (Approximate)   SpO2 99%   Breastfeeding No   BMI 32.06 kg/m     Pleasant without acute distress    A:    Menorrhagia with irregular cycle with Micronor  Need of STI screening  Hx of ovarian cysts  Hx of Migraines  Sexually active  Birth control  Lab unable to determine urine hCG result      P:    Pap  Wet prep, GC/CT, AMA, Testerone total and free, DHEA, glucose, A1C, prolactin, TSH  Urine  HCG    HCG quant  Pelvic US  Contraception counseling      Total visit greater than 30 minutes with 25 minutes spent face to face counseling this patient on menorrhagia, irregular cycle vs normal cycle, side effects of progesterone only pill, correct administration of progesterone only pill, history of ovarian cysts, sexually transmitted infections, history of migraines, cervical cancer screening, and contraception counseling.    Zac Vega, MYRTLE, CNM    "

## 2021-01-19 NOTE — PATIENT INSTRUCTIONS
Return to office as needed for follow up care and contraception needs.    Thank you for allowing Zac IRAHETA CNM and our OB team to participate in your care.  If you have a scheduling or an appointment question please contact [facility] Coordinator at their direct line [phone].   ALL nursing questions or concerns can be directed to your OB nurse at: [phone] Alia Ruiz/Yael

## 2021-01-19 NOTE — NURSING NOTE
"Chief Complaint   Patient presents with     Abnormal Bleeding Problem     irregular cycles, heavy periods (stringy blood clots dark)       Initial /66 (BP Location: Left arm, Cuff Size: Adult Regular)   Pulse 93   Temp 98.5  F (36.9  C) (Tympanic)   Resp 14   Ht 1.6 m (5' 3\")   Wt 82.1 kg (181 lb)   LMP 12/28/2020 (Approximate)   SpO2 99%   Breastfeeding No   BMI 32.06 kg/m   Estimated body mass index is 32.06 kg/m  as calculated from the following:    Height as of this encounter: 1.6 m (5' 3\").    Weight as of this encounter: 82.1 kg (181 lb).  Medication Reconciliation: complete  Astrid Valentine LPN  "

## 2021-01-20 LAB
C TRACH DNA SPEC QL NAA+PROBE: NOT DETECTED
N GONORRHOEA DNA SPEC QL NAA+PROBE: NOT DETECTED
PROLACTIN SERPL-MCNC: 9 UG/L (ref 3–27)
SPECIMEN SOURCE: NORMAL

## 2021-01-21 LAB — DHEA-S SERPL-MCNC: 136 UG/DL (ref 35–430)

## 2021-01-21 NOTE — PROGRESS NOTES
Assessment & Plan     1. 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     2. Major depressive disorder, recurrent episode, moderate (H)  - vilazodone (VIIBRYD) 20 MG TABS tablet; Take 1 tablet (20 mg) by mouth daily  Dispense: 30 tablet; Refill: 1  - MENTAL HEALTH REFERRAL  - Adult; Outpatient Treatment; Individual/Couples/Family/Group Therapy/Health Psychology; Other: [facility] [phone]; We will contact you to schedule the appointment or please call with any questions  - Vitamin D Deficiency  - CBC with platelets and differential    3. Generalized anxiety disorder  - MENTAL HEALTH REFERRAL  - Adult; Outpatient Treatment; Individual/Couples/Family/Group Therapy/Health Psychology; Other: [facility] [phone]; We will contact you to schedule the appointment or please call with any questions  - hydrOXYzine (ATARAX) 25 MG tablet; Take 1 tablet (25 mg) by mouth 3 times daily as needed for anxiety  Dispense: 30 tablet; Refill: 1    4. Bilateral carpal tunnel syndrome  Dr Galvez for EMG  - NEUROLOGY ADULT REFERRAL  - Miscellaneous Order for DME - ONLY FOR DME - braces          Return in about 2 weeks (around 2/8/2021) for anxiety/depression.    Tiara Hidalgo, NP  [facility]    Darnell Weaver is a 21 year old who presents to clinic today for the following health issues     HPI       Depression and Anxiety Follow-Up    How are you doing with your depression since your last visit? Worsened     How are you doing with your anxiety  "since your last visit?  Worsened     Are you having other symptoms that might be associated with depression or anxiety? Yes:  panic attacks bouts of crying and anger     Have you had a significant life event? OTHER: dog is sick      Do you have any concerns with your use of alcohol or other drugs? No     She is going to school, struggling to keep up.  She had been working at [employer] but had to quit because \"it was just too much.\"      She is struggling even just to shower and take care of her daughter.  Denies thoughts of self-harm or harming others.  Just wants help.  Declines inpatient or [facility] referral - \"I don't want to do that again\" thought it made things worse.      Social History     Tobacco Use     Smoking status: Never Smoker     Smokeless tobacco: Never Used   Substance Use Topics     Alcohol use: No     Drug use: No     PHQ 10/2/2020 1/6/2021 1/25/2021   PHQ-9 Total Score 17 19 26   Q9: Thoughts of better off dead/self-harm past 2 weeks Several days Several days More than half the days   F/U: Thoughts of suicide or self-harm - - -   F/U: Self harm-plan - - -   F/U: Self-harm action - - -   F/U: Safety concerns - - -     RUFINA-7 SCORE 10/2/2020 1/6/2021 1/25/2021   Total Score 21 21 21     Last PHQ-9 1/25/2021   1.  Little interest or pleasure in doing things 3   2.  Feeling down, depressed, or hopeless 3   3.  Trouble falling or staying asleep, or sleeping too much 3   4.  Feeling tired or having little energy 3   5.  Poor appetite or overeating 3   6.  Feeling bad about yourself 3   7.  Trouble concentrating 3   8.  Moving slowly or restless 3   Q9: Thoughts of better off dead/self-harm past 2 weeks 2   PHQ-9 Total Score 26   Difficulty at work, home, or with people Extremely dIfficult   In the past two weeks have you had thoughts of suicide or self harm? -   Do you have concerns about your personal safety or the safety of others? -   In the past 2 weeks have you thought about a plan or had " intention to harm yourself? -   In the past 2 weeks have you acted on these thoughts in any way? -     RUFINA-7  1/25/2021   1. Feeling nervous, anxious, or on edge 3   2. Not being able to stop or control worrying 3   3. Worrying too much about different things 3   4. Trouble relaxing 3   5. Being so restless that it is hard to sit still 3   6. Becoming easily annoyed or irritable 3   7. Feeling afraid, as if something awful might happen 3   RUFINA-7 Total Score 21   If you checked any problems, how difficult have they made it for you to do your work, take care of things at home, or get along with other people? Extremely difficult       Suicide Assessment Five-step Evaluation and Treatment (SAFE-T)      How many servings of fruits and vegetables do you eat daily?  2-3    On average, how many sweetened beverages do you drink each day (Examples: soda, juice, sweet tea, etc.  Do NOT count diet or artificially sweetened beverages)?   0    How many days per week do you exercise enough to make your heart beat faster? 3 or less    How many minutes a day do you exercise enough to make your heart beat faster? 9 or less  How many days per week do you miss taking your medication? 3    What makes it hard for you to take your medications?  remembering to take    Concern - TMJ  Onset: long time   Description: bilateral   Intensity: moderate  Progression of Symptoms:  intermittent  Accompanying Signs & Symptoms: hurts more in the cold   Previous history of similar problem: yes   Precipitating factors:        Worsened by: cold   Alleviating factors:        Improved by: nothing   Therapies tried and outcome: heat and cold and tylenol - symptoms are stable    Musculoskeletal problem/pain  Onset/Duration: 3 years   Description  Location: wrist - bilateral  Joint Swelling: no  Redness: no  Pain: YES  Warmth: no  Intensity:  moderate, severe  Progression of Symptoms:  worsening  Accompanying signs and symptoms:   Fevers:  no  Numbness/tingling/weakness: YES- weakness and tingling hard to open jars  History  Trauma to the area: no  Recent illness:  no  Previous similar problem: no  Previous evaluation:  no  Precipitating or alleviating factors:  Aggravating factors include: repetitive activities   Therapies tried and outcome: acetaminophen and braces     Patient Active Problem List   Diagnosis     Tinnitus     Major depressive disorder, recurrent episode, moderate (H)     H/O methicillin resistant Staphylococcus aureus infection     Anxiety state     Bipolar disorder, in full remission, most recent episode depressed (H)     Mild intellectual disabilities     Menorrhagia with irregular cycle     Past Surgical History:   Procedure Laterality Date     HEAD & NECK SURGERY      laser on face     LAPAROSCOPIC CYSTECTOMY OVARIAN (ONCOLOGY) Left 8/22/2019    Procedure: LAPAROSCOPY TREAT LEFT OVARIAN CYST, MULTIPLE ASPIRATIONS LEFT OVARIAN CYST;  Surgeon: Frow, David Franke, MD;  Location: [facility] surgery birthmark to left side of face         Social History     Tobacco Use     Smoking status: Never Smoker     Smokeless tobacco: Never Used   Substance Use Topics     Alcohol use: No     Family History   Problem Relation Age of Onset     Bipolar Disorder Mother      Depression Mother      Heart Disease Father      Diabetes Other      Myocardial Infarction Paternal Grandfather      Lung Cancer Paternal Grandfather      Other - See Comments Paternal Aunt         mulitiple sclerosis     Breast Cancer Maternal Grandfather         x 2         Current Outpatient Medications   Medication Sig Dispense Refill     albuterol (PROAIR HFA/PROVENTIL HFA/VENTOLIN HFA) 108 (90 Base) MCG/ACT inhaler Inhale 2 puffs into the lungs every 6 hours as needed for shortness of breath / dyspnea or wheezing 1 Inhaler 3     budesonide-formoterol (SYMBICORT) 80-4.5 MCG/ACT Inhaler Inhale 2 puffs into the lungs 2 times daily 3 Inhaler 1     hydrOXYzine (ATARAX) 25 MG  "tablet Take 1 tablet (25 mg) by mouth 3 times daily as needed for anxiety 30 tablet 1     hyoscyamine (LEVSIN) 0.125 MG tablet TAKE 1 TABLET BY MOUTH EVERY 4 TO 6 HOURS AS NEEDED FOR ABDOMINAL PAIN       norethindrone (MICRONOR) 0.35 MG tablet Take 1 tablet (0.35 mg) by mouth 2 times daily (with meals) Begin these new pills when completes current packet of old pills. 56 tablet 11     vilazodone (VIIBRYD) 20 MG TABS tablet Take 1 tablet (20 mg) by mouth daily 30 tablet 1     Allergies   Allergen Reactions     Lexapro [Escitalopram] Other (See Comments)     hypersensitivity to touch      Recent Labs   Lab Test 01/19/21  1428 12/25/20 08/21/20 06/03/20  1047 05/08/18  1027 05/08/18  1027 06/22/17  2357   A1C 5.1  --   --   --   --   --   --    ALT  --  14 12 24   < >  --   --    CR  --  0.81 0.73 0.68   < >  --  0.41*   GFRESTIMATED  --  >60 >60 >90   < >  --  >90  Non  GFR Calc     GFRESTBLACK  --   --   --  >90  --   --  >90  African American GFR Calc     POTASSIUM  --  3.6 3.3* 3.6   < >  --  3.8   TSH 1.46  --   --   --   --  2.26  --     < > = values in this interval not displayed.      BP Readings from Last 3 Encounters:   01/25/21 114/62   01/19/21 110/66   01/06/21 124/68    Wt Readings from Last 3 Encounters:   01/25/21 82.1 kg (181 lb)   01/19/21 82.1 kg (181 lb)   01/06/21 83 kg (183 lb)                        Review of Systems   Constitutional, HEENT, cardiovascular, pulmonary, gi and gu systems are negative, except as otherwise noted.      Objective    /62 (BP Location: Right arm, Patient Position: Chair, Cuff Size: Adult Large)   Pulse 89   Temp 98.2  F (36.8  C) (Tympanic)   Resp 16   Ht 1.6 m (5' 3\")   Wt 82.1 kg (181 lb)   LMP 12/28/2020 (Approximate)   SpO2 99%   BMI 32.06 kg/m    Body mass index is 32.06 kg/m .  Physical Exam   GENERAL: alert and no distress but depressed  RESP: lungs clear to auscultation - no rales, rhonchi or wheezes  CV: regular rate and rhythm, " normal S1 S2, no S3 or S4, no murmur, click or rub, no peripheral edema and peripheral pulses strong  MS: no gross musculoskeletal defects noted, no edema  PSYCH: mentation appears normal, affect depressed.

## 2021-01-22 LAB
COPATH REPORT: NORMAL
PAP: NORMAL
SHBG SERPL-SCNC: 37 NMOL/L (ref 30–135)
TESTOST FREE SERPL-MCNC: 0.34 NG/DL (ref 0.08–0.74)
TESTOST SERPL-MCNC: 21 NG/DL (ref 8–60)

## 2021-01-23 LAB — MIS SERPL-MCNC: 0.92 NG/ML (ref 0.4–16.02)

## 2021-01-25 ENCOUNTER — OFFICE VISIT (OUTPATIENT)
Dept: FAMILY MEDICINE | Facility: OTHER | Age: 21
End: 2021-01-25
Attending: NURSE PRACTITIONER
Payer: COMMERCIAL

## 2021-01-25 VITALS
DIASTOLIC BLOOD PRESSURE: 62 MMHG | SYSTOLIC BLOOD PRESSURE: 114 MMHG | WEIGHT: 181 LBS | TEMPERATURE: 98.2 F | BODY MASS INDEX: 32.07 KG/M2 | OXYGEN SATURATION: 99 % | HEART RATE: 89 BPM | HEIGHT: 63 IN | RESPIRATION RATE: 16 BRPM

## 2021-01-25 DIAGNOSIS — F41.1 GENERALIZED ANXIETY DISORDER: ICD-10-CM

## 2021-01-25 DIAGNOSIS — G56.03 BILATERAL CARPAL TUNNEL SYNDROME: ICD-10-CM

## 2021-01-25 DIAGNOSIS — F33.1 MAJOR DEPRESSIVE DISORDER, RECURRENT EPISODE, MODERATE (H): ICD-10-CM

## 2021-01-25 DIAGNOSIS — F31.4 BIPOLAR DISORDER, CURRENT EPISODE DEPRESSED, SEVERE, WITHOUT PSYCHOTIC FEATURES (H): Primary | ICD-10-CM

## 2021-01-25 PROCEDURE — 99214 OFFICE O/P EST MOD 30 MIN: CPT | Performed by: NURSE PRACTITIONER

## 2021-01-25 PROCEDURE — G0463 HOSPITAL OUTPT CLINIC VISIT: HCPCS

## 2021-01-25 RX ORDER — HYDROXYZINE HYDROCHLORIDE 25 MG/1
25 TABLET, FILM COATED ORAL 3 TIMES DAILY PRN
Qty: 30 TABLET | Refills: 1 | Status: SHIPPED | OUTPATIENT
Start: 2021-01-25 | End: 2021-02-25

## 2021-01-25 RX ORDER — VILAZODONE HYDROCHLORIDE 20 MG/1
20 TABLET ORAL DAILY
Qty: 30 TABLET | Refills: 1 | Status: SHIPPED | OUTPATIENT
Start: 2021-01-25 | End: 2021-01-28

## 2021-01-25 ASSESSMENT — ANXIETY QUESTIONNAIRES
4. TROUBLE RELAXING: NEARLY EVERY DAY
1. FEELING NERVOUS, ANXIOUS, OR ON EDGE: NEARLY EVERY DAY
6. BECOMING EASILY ANNOYED OR IRRITABLE: NEARLY EVERY DAY
2. NOT BEING ABLE TO STOP OR CONTROL WORRYING: NEARLY EVERY DAY
7. FEELING AFRAID AS IF SOMETHING AWFUL MIGHT HAPPEN: NEARLY EVERY DAY
3. WORRYING TOO MUCH ABOUT DIFFERENT THINGS: NEARLY EVERY DAY
GAD7 TOTAL SCORE: 21
5. BEING SO RESTLESS THAT IT IS HARD TO SIT STILL: NEARLY EVERY DAY
IF YOU CHECKED OFF ANY PROBLEMS ON THIS QUESTIONNAIRE, HOW DIFFICULT HAVE THESE PROBLEMS MADE IT FOR YOU TO DO YOUR WORK, TAKE CARE OF THINGS AT HOME, OR GET ALONG WITH OTHER PEOPLE: EXTREMELY DIFFICULT

## 2021-01-25 ASSESSMENT — PAIN SCALES - GENERAL: PAINLEVEL: EXTREME PAIN (8)

## 2021-01-25 ASSESSMENT — PATIENT HEALTH QUESTIONNAIRE - PHQ9: SUM OF ALL RESPONSES TO PHQ QUESTIONS 1-9: 26

## 2021-01-25 ASSESSMENT — MIFFLIN-ST. JEOR: SCORE: 1555.14

## 2021-01-25 NOTE — NURSING NOTE
"Chief Complaint   Patient presents with     Depression     Anxiety     TMJ       Initial /62 (BP Location: Right arm, Patient Position: Chair, Cuff Size: Adult Large)   Pulse 89   Temp 98.2  F (36.8  C) (Tympanic)   Resp 16   Ht 1.6 m (5' 3\")   Wt 82.1 kg (181 lb)   LMP 12/28/2020 (Approximate)   SpO2 99%   BMI 32.06 kg/m   Estimated body mass index is 32.06 kg/m  as calculated from the following:    Height as of this encounter: 1.6 m (5' 3\").    Weight as of this encounter: 82.1 kg (181 lb).  Medication Reconciliation: complete  Pamela M. Lechevalier, LPN    "

## 2021-01-25 NOTE — PATIENT INSTRUCTIONS
Assessment & Plan     1. Bipolar disorder, in full remission, most recent episode depressed (H)  Start viibryd once daily  - vilazodone (VIIBRYD) 20 MG TABS tablet; Take 1 tablet (20 mg) by mouth daily  Dispense: 30 tablet; Refill: 1  - MENTAL HEALTH REFERRAL  - Adult; Outpatient Treatment; Individual/Couples/Family/Group Therapy/Health Psychology; Other: [facility] [phone]; We will contact you to schedule the appointment or please call with any questions    2. Major depressive disorder, recurrent episode, moderate (H)  - vilazodone (VIIBRYD) 20 MG TABS tablet; Take 1 tablet (20 mg) by mouth daily  Dispense: 30 tablet; Refill: 1  - MENTAL HEALTH REFERRAL  - Adult; Outpatient Treatment; Individual/Couples/Family/Group Therapy/Health Psychology; Other: [facility] [phone]; We will contact you to schedule the appointment or please call with any questions  - Vitamin D Deficiency  - CBC with platelets and differential    3. Generalized anxiety disorder  - MENTAL HEALTH REFERRAL  - Adult; Outpatient Treatment; Individual/Couples/Family/Group Therapy/Health Psychology; Other: [facility] [phone]; We will contact you to schedule the appointment or please call with any questions  - hydrOXYzine (ATARAX) 25 MG tablet; Take 1 tablet (25 mg) by mouth 3 times daily as needed for anxiety  Dispense: 30 tablet; Refill: 1    4. Bilateral carpal tunnel syndrome  Dr Galvez for EMG  - NEUROLOGY ADULT REFERRAL  - Miscellaneous Order for DME - ONLY FOR DME - braces          Return in about 2 weeks (around 2/8/2021) for anxiety/depression.    Tiara Hidalgo, NP  [facility]

## 2021-01-26 ENCOUNTER — TELEPHONE (OUTPATIENT)
Dept: FAMILY MEDICINE | Facility: OTHER | Age: 21
End: 2021-01-26

## 2021-01-26 ENCOUNTER — OFFICE VISIT (OUTPATIENT)
Dept: OBGYN | Facility: OTHER | Age: 21
End: 2021-01-26
Attending: ADVANCED PRACTICE MIDWIFE
Payer: COMMERCIAL

## 2021-01-26 VITALS
DIASTOLIC BLOOD PRESSURE: 67 MMHG | SYSTOLIC BLOOD PRESSURE: 122 MMHG | WEIGHT: 181 LBS | BODY MASS INDEX: 32.07 KG/M2 | HEIGHT: 63 IN

## 2021-01-26 DIAGNOSIS — Z30.430 ENCOUNTER FOR INSERTION OF INTRAUTERINE CONTRACEPTIVE DEVICE: Primary | ICD-10-CM

## 2021-01-26 DIAGNOSIS — F41.1 GENERALIZED ANXIETY DISORDER: ICD-10-CM

## 2021-01-26 DIAGNOSIS — F33.1 MAJOR DEPRESSIVE DISORDER, RECURRENT EPISODE, MODERATE (H): ICD-10-CM

## 2021-01-26 DIAGNOSIS — Z30.09 FAMILY PLANNING: ICD-10-CM

## 2021-01-26 DIAGNOSIS — F31.4 BIPOLAR DISORDER, CURRENT EPISODE DEPRESSED, SEVERE, WITHOUT PSYCHOTIC FEATURES (H): Primary | ICD-10-CM

## 2021-01-26 DIAGNOSIS — Z30.430 ENCOUNTER FOR INSERTION OF INTRAUTERINE CONTRACEPTIVE DEVICE (IUD): ICD-10-CM

## 2021-01-26 LAB — HCG UR QL: NEGATIVE

## 2021-01-26 PROCEDURE — 58300 INSERT INTRAUTERINE DEVICE: CPT | Performed by: ADVANCED PRACTICE MIDWIFE

## 2021-01-26 PROCEDURE — G0463 HOSPITAL OUTPT CLINIC VISIT: HCPCS | Mod: 25

## 2021-01-26 PROCEDURE — 58300 INSERT INTRAUTERINE DEVICE: CPT

## 2021-01-26 PROCEDURE — 81025 URINE PREGNANCY TEST: CPT | Mod: ZL | Performed by: ADVANCED PRACTICE MIDWIFE

## 2021-01-26 RX ADMIN — LEVONORGESTREL 20 MCG: 52 INTRAUTERINE DEVICE INTRAUTERINE at 17:04

## 2021-01-26 ASSESSMENT — ANXIETY QUESTIONNAIRES: GAD7 TOTAL SCORE: 21

## 2021-01-26 ASSESSMENT — PAIN SCALES - GENERAL: PAINLEVEL: NO PAIN (0)

## 2021-01-26 ASSESSMENT — MIFFLIN-ST. JEOR: SCORE: 1555.14

## 2021-01-26 NOTE — PATIENT INSTRUCTIONS
Return to office in 3 weeks for follow up care and as needed.    Thank you for allowing Zac IRAHETA CNM and our OB team to participate in your care.  If you have a scheduling or an appointment question please contact [facility] Coordinator at their direct line [phone].   ALL nursing questions or concerns can be directed to your OB nurse at: [phone] Alia Ruiz/Yael

## 2021-01-26 NOTE — NURSING NOTE
"Chief Complaint   Patient presents with     IUD       Initial /67 (BP Location: Left arm, Patient Position: Chair, Cuff Size: Adult Regular)   Ht 1.6 m (5' 3\")   Wt 82.1 kg (181 lb)   LMP 12/28/2020 (Approximate)   BMI 32.06 kg/m   Estimated body mass index is 32.06 kg/m  as calculated from the following:    Height as of this encounter: 1.6 m (5' 3\").    Weight as of this encounter: 82.1 kg (181 lb).  Medication Reconciliation: complete  Sara Morel LPN    "

## 2021-01-26 NOTE — TELEPHONE ENCOUNTER
Received a PA from [employer] for Viibryd 20mg tablets. Submitted on CMM. Waiting for a response. Forms scanned to Epic.

## 2021-01-26 NOTE — PROGRESS NOTES
"CC:  IUD insertion for family planning or bleeding control  HPI:  Meg Diallo is a 21 year old female Patient's last menstrual period was 12/28/2020 (approximate).  No other c/o.  She is here for an IUD insertion. Patient has verbalized understanding of risks and benefits and has signed the consent form.          Prior ectopic n  Prior CT n    Allergies: Lexapro [escitalopram]    EXAM:  Blood pressure 122/67, height 1.6 m (5' 3\"), weight 82.1 kg (181 lb), last menstrual period 12/28/2020, not currently breastfeeding.  General - pleasant female in no acute distress.  Pelvic - External Genitalia: normal adult female; [place],urethral and [place]: within normal limits,   Vagina: well rugated, no discharge,   Cervix: no lesions or Cervical Motion Tenderness,   Uterus: firm, normal sized and nontender, Adnexae: no masses or tenderness.    PROCEDURE:  After informed consent was obtained from the patient, a speculum was placed in the vagina to visualized the cervix  Tenaculum was placed at the 12 o'clock.  The Mirena IUD was then placed in the usual fashion.  Strings were clipped about 2-3 cm from the cervical os.  Tenaculum was removed and cervix was hemostatic. There were no complications. The patient tolerated the procedure well.    8/10 went to 4/10 immediately after.    ASSESSMENT:  Contraception management  Request IUD  Wet prep and GC/CT, and hCG negative one week ago (1/19/21)  Reports abstinence for past 7 days  Currently using Progesterone only pill (POP)  Negative hCG today    PLAN:  IUD insertion   Urine hCG qualitative   Instructed to wait 7 days for protection       The patient should feel for the IUD strings after her next menses.  If unable to locate them, she should return to clinic for a speculum examination for confirmation that the IUD is in place. Bleeding pattern of this particular IUD was discussed with the patient. She is aware that the IUD will need to be removed in 5 years or PRN.  She is to " return in 3 wks  to clinic and for her next annual or PRN.      .  Zac Vega, MYRTLE, CNM

## 2021-01-28 ENCOUNTER — HOSPITAL ENCOUNTER (OUTPATIENT)
Dept: ULTRASOUND IMAGING | Facility: HOSPITAL | Age: 21
Discharge: HOME | End: 2021-01-28
Attending: ADVANCED PRACTICE MIDWIFE | Admitting: ADVANCED PRACTICE MIDWIFE
Payer: COMMERCIAL

## 2021-01-28 DIAGNOSIS — Z87.42 HX OF OVARIAN CYST: ICD-10-CM

## 2021-01-28 DIAGNOSIS — N92.1 MENORRHAGIA WITH IRREGULAR CYCLE: ICD-10-CM

## 2021-01-28 PROCEDURE — 76830 TRANSVAGINAL US NON-OB: CPT

## 2021-01-28 RX ORDER — BUPROPION HYDROCHLORIDE 150 MG/1
150 TABLET ORAL EVERY MORNING
Qty: 30 TABLET | Refills: 1 | Status: SHIPPED | OUTPATIENT
Start: 2021-01-28 | End: 2021-02-25

## 2021-01-28 NOTE — TELEPHONE ENCOUNTER
Received a DENIAL from [facility] for Viibryd 20mg tablets.        Forms scanned to Epic.

## 2021-01-29 ENCOUNTER — TRANSFERRED RECORDS (OUTPATIENT)
Dept: HEALTH INFORMATION MANAGEMENT | Facility: CLINIC | Age: 21
End: 2021-01-29

## 2021-01-29 LAB
ALT SERPL-CCNC: 15 IU/L (ref 6–31)
AST SERPL-CCNC: 14 IU/L (ref 10–40)
CREAT SERPL-MCNC: 0.77 MG/DL (ref 0.4–1)
GFR SERPL CREATININE-BSD FRML MDRD: >60 ML/MIN/1.73M2
GLUCOSE SERPL-MCNC: 103 MG/DL (ref 70–99)
POTASSIUM SERPL-SCNC: 3 MEQ/L (ref 3.4–5.1)
TSH SERPL-ACNC: 4.17 UIU/ML (ref 0.4–3.99)

## 2021-02-25 ENCOUNTER — OFFICE VISIT (OUTPATIENT)
Dept: OBGYN | Facility: OTHER | Age: 21
End: 2021-02-25
Attending: ADVANCED PRACTICE MIDWIFE
Payer: COMMERCIAL

## 2021-02-25 VITALS
WEIGHT: 181 LBS | BODY MASS INDEX: 32.07 KG/M2 | DIASTOLIC BLOOD PRESSURE: 67 MMHG | HEIGHT: 63 IN | SYSTOLIC BLOOD PRESSURE: 108 MMHG

## 2021-02-25 DIAGNOSIS — Z30.432 ENCOUNTER FOR IUD REMOVAL: Primary | ICD-10-CM

## 2021-02-25 DIAGNOSIS — Z30.013 ENCOUNTER FOR INITIAL PRESCRIPTION OF INJECTABLE CONTRACEPTIVE: ICD-10-CM

## 2021-02-25 DIAGNOSIS — Z30.09 ENCOUNTER FOR COUNSELING REGARDING CONTRACEPTION: ICD-10-CM

## 2021-02-25 PROCEDURE — 58301 REMOVE INTRAUTERINE DEVICE: CPT | Performed by: ADVANCED PRACTICE MIDWIFE

## 2021-02-25 PROCEDURE — 99213 OFFICE O/P EST LOW 20 MIN: CPT | Mod: 25 | Performed by: ADVANCED PRACTICE MIDWIFE

## 2021-02-25 PROCEDURE — 96372 THER/PROPH/DIAG INJ SC/IM: CPT

## 2021-02-25 PROCEDURE — G0463 HOSPITAL OUTPT CLINIC VISIT: HCPCS | Mod: 25

## 2021-02-25 RX ORDER — MEDROXYPROGESTERONE ACETATE 150 MG/ML
150 INJECTION, SUSPENSION INTRAMUSCULAR
Status: ACTIVE | OUTPATIENT
Start: 2021-02-25 | End: 2022-02-20

## 2021-02-25 RX ADMIN — MEDROXYPROGESTERONE ACETATE 150 MG: 150 INJECTION, SUSPENSION INTRAMUSCULAR at 16:05

## 2021-02-25 ASSESSMENT — MIFFLIN-ST. JEOR: SCORE: 1555.14

## 2021-02-25 ASSESSMENT — PAIN SCALES - GENERAL: PAINLEVEL: SEVERE PAIN (7)

## 2021-02-25 NOTE — NURSING NOTE
"Chief Complaint   Patient presents with     IUD       Initial /67 (BP Location: Left arm, Patient Position: Chair, Cuff Size: Adult Regular)   Ht 1.6 m (5' 3\")   Wt 82.1 kg (181 lb)   BMI 32.06 kg/m   Estimated body mass index is 32.06 kg/m  as calculated from the following:    Height as of this encounter: 1.6 m (5' 3\").    Weight as of this encounter: 82.1 kg (181 lb).  Medication Reconciliation: complete  Sara Morel LPN    "

## 2021-02-25 NOTE — PROGRESS NOTES
"Meg Diallo is a 21 year old female  Here today for IUD removal.  Pt reports she feels the IUD is causing sharp pain in her upper abdomin, sides, and back.  Also concerned about feeling strings.  Declines ultrasound for IUD placement.  Pt reports no sexual intercourse for > 7 days.    Pt reports history of migraines.      O:   /67 (BP Location: Left arm, Patient Position: Chair, Cuff Size: Adult Regular)   Ht 1.6 m (5' 3\")   Wt 82.1 kg (181 lb)   BMI 32.06 kg/m     Pleasant without acute   Pelvic:  Vagina and vulva are normal;  no discharge is noted.    Cervix: normal without lesions.  IUD strings visible  Uterus:  mobile,  normal in size and shape without tenderness.  Adnexa: without masses or tenderness.      A:    Contraception management  IUD strings visualized  Pt request IUD removal  Request Depo shot    P:    Discussed Contraception options, risks, benefits, side effects, and effectiveness  IUD removal  Depo shot  Discussed safe sex/condoms  RTO in 3 months for next Depo shot    Additional 20 minutes not including procedure, spent on the date of the encounter doing chart review, history and exam, documentation and further activities as noted above      MYRTLE [place], ANAT    Procedure:  After verbal consent was obtained from the patient, IUD strings were grasped with ring forcep and IUD easily removed intact with minimal patient discomfort noted.  No bleeding noted.    MYRTLE [place], ANAT    "

## 2021-02-25 NOTE — NURSING NOTE
Clinic Administered Medication Documentation      Depo Provera Documentation    URINE HCG: not indicated    Depo-Provera Standing Order inclusion/exclusion criteria reviewed.   Patient meets: inclusion criteria     BP: 108/67  LAST PAP/EXAM:   Lab Results   Component Value Date    PAP NIL 01/19/2021       Prior to injection, verified patient identity using patient's name and date of birth. Medication was administered. Please see MAR and medication order for additional information.     Was entire vial of medication used? Yes  Vial/Syringe: Single dose vial  Expiration Date:  02/2022    Patient instructed to stay in clinic after the injection but patient declined.  NEXT INJECTION DUE: 5/13/21 - 5/27/21    Sara Morel LPN

## 2021-02-25 NOTE — PATIENT INSTRUCTIONS
Return to office in 3 months for Depo shot and as needed.    Thank you for allowing Zac IRAHETA CNM and our OB team to participate in your care.  If you have a scheduling or an appointment question please contact [facility] Coordinator at their direct line [phone].   ALL nursing questions or concerns can be directed to your OB nurse at: [phone] Alia Ruiz/Yael

## 2021-03-18 ENCOUNTER — OFFICE VISIT (OUTPATIENT)
Dept: FAMILY MEDICINE | Facility: OTHER | Age: 21
End: 2021-03-18
Attending: NURSE PRACTITIONER
Payer: COMMERCIAL

## 2021-03-18 VITALS
BODY MASS INDEX: 31.89 KG/M2 | HEIGHT: 63 IN | WEIGHT: 180 LBS | RESPIRATION RATE: 16 BRPM | TEMPERATURE: 98.4 F | SYSTOLIC BLOOD PRESSURE: 116 MMHG | DIASTOLIC BLOOD PRESSURE: 62 MMHG | OXYGEN SATURATION: 100 % | HEART RATE: 94 BPM

## 2021-03-18 DIAGNOSIS — N30.00 ACUTE CYSTITIS WITHOUT HEMATURIA: Primary | ICD-10-CM

## 2021-03-18 DIAGNOSIS — F31.76 BIPOLAR DISORDER, IN FULL REMISSION, MOST RECENT EPISODE DEPRESSED (H): ICD-10-CM

## 2021-03-18 DIAGNOSIS — D17.30 LIPOMA OF SKIN AND SUBCUTANEOUS TISSUE: ICD-10-CM

## 2021-03-18 DIAGNOSIS — F41.1 ANXIETY STATE: ICD-10-CM

## 2021-03-18 DIAGNOSIS — R30.0 DYSURIA: ICD-10-CM

## 2021-03-18 LAB
ALBUMIN UR-MCNC: NEGATIVE MG/DL
APPEARANCE UR: ABNORMAL
BACTERIA #/AREA URNS HPF: ABNORMAL /HPF
BILIRUB UR QL STRIP: NEGATIVE
COLOR UR AUTO: YELLOW
GLUCOSE UR STRIP-MCNC: NEGATIVE MG/DL
HGB UR QL STRIP: NEGATIVE
KETONES UR STRIP-MCNC: NEGATIVE MG/DL
LEUKOCYTE ESTERASE UR QL STRIP: ABNORMAL
NITRATE UR QL: NEGATIVE
NON-SQ EPI CELLS #/AREA URNS LPF: ABNORMAL /LPF
PH UR STRIP: 7.5 PH (ref 5–7)
RBC #/AREA URNS AUTO: ABNORMAL /HPF
SOURCE: ABNORMAL
SP GR UR STRIP: 1.02 (ref 1–1.03)
UROBILINOGEN UR STRIP-ACNC: 0.2 EU/DL (ref 0.2–1)
WBC #/AREA URNS AUTO: ABNORMAL /HPF

## 2021-03-18 PROCEDURE — 81001 URINALYSIS AUTO W/SCOPE: CPT | Mod: ZL | Performed by: NURSE PRACTITIONER

## 2021-03-18 PROCEDURE — 87086 URINE CULTURE/COLONY COUNT: CPT | Mod: ZL | Performed by: NURSE PRACTITIONER

## 2021-03-18 PROCEDURE — G0463 HOSPITAL OUTPT CLINIC VISIT: HCPCS

## 2021-03-18 PROCEDURE — 99214 OFFICE O/P EST MOD 30 MIN: CPT | Performed by: NURSE PRACTITIONER

## 2021-03-18 RX ORDER — QUETIAPINE FUMARATE 25 MG/1
TABLET, FILM COATED ORAL
COMMUNITY
Start: 2021-03-09 | End: 2021-04-22

## 2021-03-18 RX ORDER — SULFAMETHOXAZOLE/TRIMETHOPRIM 800-160 MG
1 TABLET ORAL 2 TIMES DAILY
Qty: 14 TABLET | Refills: 0 | Status: SHIPPED | OUTPATIENT
Start: 2021-03-18 | End: 2021-03-25

## 2021-03-18 ASSESSMENT — MIFFLIN-ST. JEOR: SCORE: 1550.6

## 2021-03-18 ASSESSMENT — PAIN SCALES - GENERAL: PAINLEVEL: EXTREME PAIN (8)

## 2021-03-18 NOTE — NURSING NOTE
"Chief Complaint   Patient presents with     Musculoskeletal Problem     Dysuria       Initial /62 (BP Location: Right arm, Patient Position: Chair, Cuff Size: Adult Large)   Pulse 94   Temp 98.4  F (36.9  C) (Tympanic)   Resp 16   Ht 1.6 m (5' 3\")   Wt 81.6 kg (180 lb)   SpO2 100%   BMI 31.89 kg/m   Estimated body mass index is 31.89 kg/m  as calculated from the following:    Height as of this encounter: 1.6 m (5' 3\").    Weight as of this encounter: 81.6 kg (180 lb).  Medication Reconciliation: complete  Sophy Bautista LPN    "

## 2021-03-18 NOTE — PROGRESS NOTES
Assessment & Plan     1. Acute cystitis without hematuria  Increase fluids  - sulfamethoxazole-trimethoprim (BACTRIM DS) 800-160 MG tablet; Take 1 tablet by mouth 2 times daily for 7 days  Dispense: 14 tablet; Refill: 0    2. Lipoma of skin and subcutaneous tissue  Benign     3. Dysuria  - UA with Microscopic reflex to Culture - [facility]  - Urine Culture Aerobic Bacterial    4. Bipolar disorder, in full remission, most recent episode depressed (H)  Continue following with psychiatry and counseling.     5. Anxiety state  As above    Review of the result(s) of each unique test - UA      Follow-up as needed        Tiara Hidalgo, NP  [facility]    Darnell Weaver is a 21 year old who presents for the following health issues     HPI     Genitourinary - Female  Onset/Duration: few weeks   Description:   Painful urination (Dysuria): YES- burns            Frequency: YES  Blood in urine (Hematuria): no  Delay in urine (Hesitency): YES  Intensity: moderate  Progression of Symptoms:  same  Accompanying Signs & Symptoms:  Fever/chills: no  Flank pain: YES  Nausea and vomiting: YES  Vaginal symptoms: none  Abdominal/Pelvic Pain: YES  History:   History of frequent UTI s: YES  History of kidney stones: no  Sexually Active: YES  Possibility of pregnancy: No  Precipitating or alleviating factors: None  Therapies tried and outcome: Cranberry juice prn (contraindicated in Coumadin patients) and cranberry pill       Concern - Leg pain  Onset: few days   Description: lump on shin causing pain   Intensity: moderate  Progression of Symptoms:  worsening  Accompanying Signs & Symptoms: pain  Previous history of similar problem: none  Precipitating factors:        Worsened by: none  Alleviating factors:        Improved by: none  Therapies tried and outcome: stretching     Anxiety-depression:  Has been following with psych and counseling.      Patient Active Problem List   Diagnosis      Tinnitus     Major depressive disorder, recurrent episode, moderate (H)     H/O methicillin resistant Staphylococcus aureus infection     Anxiety state     Bipolar disorder, in full remission, most recent episode depressed (H)     Mild intellectual disabilities     Menorrhagia with irregular cycle     Encounter for insertion of intrauterine contraceptive device (IUD)     Past Surgical History:   Procedure Laterality Date     HEAD & NECK SURGERY      laser on face     LAPAROSCOPIC CYSTECTOMY OVARIAN (ONCOLOGY) Left 8/22/2019    Procedure: LAPAROSCOPY TREAT LEFT OVARIAN CYST, MULTIPLE ASPIRATIONS LEFT OVARIAN CYST;  Surgeon: Frow, David Franke, MD;  Location: [facility] OR     [facility] surgery birthmark to left side of face         Social History     Tobacco Use     Smoking status: Never Smoker     Smokeless tobacco: Never Used   Substance Use Topics     Alcohol use: No     Family History   Problem Relation Age of Onset     Bipolar Disorder Mother      Depression Mother      Heart Disease Father      Diabetes Other      Myocardial Infarction Paternal Grandfather      Lung Cancer Paternal Grandfather      Other - See Comments Paternal Aunt         mulitiple sclerosis     Breast Cancer Maternal Grandfather         x 2         Current Outpatient Medications   Medication Sig Dispense Refill     albuterol (PROAIR HFA/PROVENTIL HFA/VENTOLIN HFA) 108 (90 Base) MCG/ACT inhaler Inhale 2 puffs into the lungs every 6 hours as needed for shortness of breath / dyspnea or wheezing 1 Inhaler 3     budesonide-formoterol (SYMBICORT) 80-4.5 MCG/ACT Inhaler Inhale 2 puffs into the lungs 2 times daily 3 Inhaler 1     QUEtiapine (SEROQUEL) 25 MG tablet TAKE 1 2 (ONE HALF) TABLET BY MOUTH AT BEDTIME       sulfamethoxazole-trimethoprim (BACTRIM DS) 800-160 MG tablet Take 1 tablet by mouth 2 times daily for 7 days 14 tablet 0     hyoscyamine (LEVSIN) 0.125 MG tablet TAKE 1 TABLET BY MOUTH EVERY 4 TO 6 HOURS AS NEEDED FOR ABDOMINAL PAIN        "levonorgestrel (MIRENA) 20 MCG/24HR IUD 1 each by Intrauterine route once       Allergies   Allergen Reactions     Cymbalta      Lexapro [Escitalopram] Other (See Comments)     hypersensitivity to touch      Recent Labs   Lab Test 01/29/21 01/19/21  1428 12/25/20 08/21/20 06/03/20  1047 06/22/17  2357 06/22/17  2357   A1C  --  5.1  --   --   --   --   --    ALT 15  --  14 12 24   < >  --    CR 0.77  --  0.81 0.73 0.68   < > 0.41*   GFRESTIMATED >60  --  >60 >60 >90   < > >90  Non  GFR Calc     GFRESTBLACK  --   --   --   --  >90  --  >90  African American GFR Calc     POTASSIUM 3.0*  --  3.6 3.3* 3.6   < > 3.8   TSH 4.17* 1.46  --   --   --    < >  --     < > = values in this interval not displayed.      BP Readings from Last 3 Encounters:   03/18/21 116/62   02/25/21 108/67   01/26/21 122/67    Wt Readings from Last 3 Encounters:   03/18/21 81.6 kg (180 lb)   02/25/21 82.1 kg (181 lb)   01/26/21 82.1 kg (181 lb)                      Review of Systems   Constitutional, HEENT, cardiovascular, pulmonary, gi and gu systems are negative, except as otherwise noted.      Objective    /62 (BP Location: Right arm, Patient Position: Chair, Cuff Size: Adult Large)   Pulse 94   Temp 98.4  F (36.9  C) (Tympanic)   Resp 16   Ht 1.6 m (5' 3\")   Wt 81.6 kg (180 lb)   SpO2 100%   BMI 31.89 kg/m    Body mass index is 31.89 kg/m .  Physical Exam   GENERAL: healthy, alert and no distress  RESP: lungs clear to auscultation - no rales, rhonchi or wheezes  CV: regular rate and rhythm, normal S1 S2, no S3 or S4, no murmur, click or rub, no peripheral edema and peripheral pulses strong  MS: no gross musculoskeletal defects noted, no edema  SKIN: left anterior shin - soft cystic mass consistent with lipoma.   PSYCH: mentation appears normal, affect normal/bright        Results for orders placed or performed in visit on 03/18/21   UA with Microscopic reflex to Culture - [facility]     Status: Abnormal    " Specimen: Midstream Urine   Result Value Ref Range    Color Urine Yellow     Appearance Urine Slightly Cloudy     Glucose Urine Negative NEG^Negative mg/dL    Bilirubin Urine Negative NEG^Negative    Ketones Urine Negative NEG^Negative mg/dL    Specific Gravity Urine 1.020 1.003 - 1.035    pH Urine 7.5 (H) 5.0 - 7.0 pH    Protein Albumin Urine Negative NEG^Negative mg/dL    Urobilinogen Urine 0.2 0.2 - 1.0 EU/dL    Nitrite Urine Negative NEG^Negative    Blood Urine Negative NEG^Negative    Leukocyte Esterase Urine Small (A) NEG^Negative    Source Midstream Urine     WBC Urine 5-10 (A) OTO5^0 - 5 /HPF    RBC Urine O - 2 OTO2^O - 2 /HPF    Squamous Epithelial /LPF Urine Few FEW^Few /LPF    Bacteria Urine Few (A) NEG^Negative /HPF

## 2021-03-18 NOTE — PATIENT INSTRUCTIONS
Assessment & Plan     1. Acute cystitis without hematuria  Increase fluids  - sulfamethoxazole-trimethoprim (BACTRIM DS) 800-160 MG tablet; Take 1 tablet by mouth 2 times daily for 7 days  Dispense: 14 tablet; Refill: 0    2. Lipoma of skin and subcutaneous tissue  Benign     3. Dysuria  - UA with Microscopic reflex to Culture - [facility]  - Urine Culture Aerobic Bacterial    4. Bipolar disorder, in full remission, most recent episode depressed (H)  Continue following with psychiatry and counseling.     5. Anxiety state  As above    Review of the result(s) of each unique test - UA      Follow-up as needed        Tiara Hidalgo, NP  [facility]

## 2021-03-20 LAB
BACTERIA SPEC CULT: ABNORMAL
SPECIMEN SOURCE: ABNORMAL

## 2021-03-24 ENCOUNTER — TRANSFERRED RECORDS (OUTPATIENT)
Dept: HEALTH INFORMATION MANAGEMENT | Facility: CLINIC | Age: 21
End: 2021-03-24

## 2021-03-24 LAB
CREAT SERPL-MCNC: 0.86 MG/DL (ref 0.4–1)
GFR SERPL CREATININE-BSD FRML MDRD: >60 ML/MIN/1.73M2
GLUCOSE SERPL-MCNC: 92 MG/DL (ref 70–99)
POTASSIUM SERPL-SCNC: 3.4 MEQ/L (ref 3.4–5.1)

## 2021-04-13 ENCOUNTER — TELEPHONE (OUTPATIENT)
Dept: FAMILY MEDICINE | Facility: OTHER | Age: 21
End: 2021-04-13

## 2021-04-13 NOTE — PROGRESS NOTES
"  Assessment & Plan     1. Pelvic pain  All tests are normal  - *UA reflex to Microscopic and Culture - [facility]  - Wet prep  - GC/Chlamydia by PCR  - pending  - Urine Microscopic    2. Bilateral non-suppurative otitis media  - amoxicillin (AMOXIL) 875 MG tablet; Take 1 tablet (875 mg) by mouth 2 times daily for 10 days  Dispense: 20 tablet; Refill: 0    3. Excessive cerumen in ear canal, bilateral  For ear cleaning.   - OTOLARYNGOLOGY REFERRAL    [facility]    Patient is seen in conjunction with PA student.  History is reviewed with patient and pertinent portions of the exam are repeated.  Assessment and plan is reviewed with the patient.        Tiara Hidalgo NP  [facility]      Darnell Weaver is a 21 year old who presents for the following health issues     HPI     Concern - Ear popping   Onset: 2-3 weeks  Description: both ears pop with shooting pain and congested  Intensity: moderate  Progression of Symptoms:  worsening  Accompanying Signs & Symptoms: congestion, shooting pain, vertigo, nausea, achy pain to fingers from ears  Previous history of similar problem: yes  Precipitating factors:        Worsened by: none  Alleviating factors:        Improved by: heat packs  Therapies tried and outcome: tylenol and heat packs takes pain away for a short time    - Have had two episodes of dizziness that lasted for ~4 minutes  -Some \"fuzziness\" in her head with thinking/concentration  -Endorses: Sore throat, congestion in front of ears and by nose, Post nasal, dry mouth, tickle in the throat  -Denies rhinorrhea, ottorrhea  -Tugging on ears provides some relief and warm compresses help  -Having troubles hearing voices, muffled    -Have had ear infections in the past and this feels similar  -Has TMJ and Seasonal allergies      Follow-Up Bladder Infection/ Pelvic Pain  -3/18/21: was treated for acute cystitis without hematuria with " Bactrim 800-160mg tab twice daily for 7 days  -Feels UTI is still going on  -Endorses hesitancy with urination  -Sharp pains in lower abdomen/pelvic area   -Located on both sides, can alternate one side or be throughout lower abdomen   -Comes and goes  -Describes pain on vagina, feels like someone kicked her there    -LMP: N/A from Depo  -Denies being sexually active    PHQ 1/6/2021 1/25/2021 4/16/2021   PHQ-9 Total Score 19 26 21   Q9: Thoughts of better off dead/self-harm past 2 weeks Several days More than half the days Not at all   F/U: Thoughts of suicide or self-harm - - -   F/U: Self harm-plan - - -   F/U: Self-harm action - - -   F/U: Safety concerns - - -     RUFINA-7 SCORE 1/6/2021 1/25/2021 4/16/2021   Total Score 21 21 21       Review of Systems   CONSTITUTIONAL: NEGATIVE for fever, chills, change in weight  INTEGUMENTARY/SKIN: NEGATIVE for worrisome rashes, moles or lesions  EYES: NEGATIVE for vision changes or irritation  ENT/MOUTH:  POSITIVE for sore throat, ear pain per HPI  RESP: NEGATIVE for significant cough or SOB  CV: NEGATIVE for chest pain, palpitations or peripheral edema  GI: NEGATIVE for nausea, heartburn, or change in bowel habits; POSITIVE for   : NEGATIVE for frequency, dysuria, or hematuria; POSITIVE for hesitancy  MUSCULOSKELETAL: NEGATIVE for significant arthralgias or myalgia  NEURO: NEGATIVE for headaches, weakness, or paresthesias; POSITIVE for  dizziness  PSYCHIATRIC: NEGATIVE for changes in mood or affect        Objective    /64 (BP Location: Left arm, Patient Position: Sitting, Cuff Size: Adult Regular)   Pulse 84   Temp 98.5  F (36.9  C) (Tympanic)   Resp 18   Wt 84.8 kg (187 lb)   SpO2 99%   BMI 33.13 kg/m    Body mass index is 33.13 kg/m .       Physical Exam   GENERAL: healthy, alert and no distress  HENT: Nose and mouth without ulcers or lesions; External ears are normal bilaterally. Left ear canal and tympanic membrane are completely obstructed by dark  yellow/reddish cerumen. Right tympanic membrane visible and slightly bulging with impacted cerumen obstructing part of ear canal, so unable to do complete assessment. Tenderness to palpation over tragus and mastoid processes bilaterally. No erythema or swelling around ear.  NECK: no adenopathy, no asymmetry, masses, or scars and thyroid normal to palpation  RESP: lungs clear to auscultation - no rales, rhonchi or wheezes  CV: regular rate and rhythm, normal S1 S2, no S3 or S4, no murmur, click or rub, no peripheral edema and peripheral pulses strong      Results for orders placed or performed in visit on 04/16/21 (from the past 24 hour(s))   *UA reflex to Microscopic and Culture - [facility]    Specimen: Midstream Urine   Result Value Ref Range    Color Urine Yellow     Appearance Urine Clear     Glucose Urine Negative NEG^Negative mg/dL    Bilirubin Urine Negative NEG^Negative    Ketones Urine Negative NEG^Negative mg/dL    Specific Gravity Urine 1.020 1.003 - 1.035    Blood Urine Negative NEG^Negative    pH Urine 6.0 5.0 - 7.0 pH    Protein Albumin Urine Negative NEG^Negative mg/dL    Urobilinogen Urine 0.2 0.2 - 1.0 EU/dL    Nitrite Urine Negative NEG^Negative    Leukocyte Esterase Urine Trace (A) NEG^Negative    Source Midstream Urine    Urine Microscopic   Result Value Ref Range    WBC Urine 0 - 5 OTO5^0 - 5 /HPF    RBC Urine O - 2 OTO2^O - 2 /HPF    Squamous Epithelial /LPF Urine Moderate (A) FEW^Few /LPF    Bacteria Urine Few (A) NEG^Negative /HPF   Wet prep    Specimen: Vagina   Result Value Ref Range    Specimen Description Vagina     Wet Prep No clue cells seen     Wet Prep No yeast seen     Wet Prep No Trichomonas seen     Wet Prep WBC'S seen  Few

## 2021-04-13 NOTE — TELEPHONE ENCOUNTER
Call from patient reporting her neighbor and daughter are showing symptoms of covid, running around apartment building without masks.     Patient requesting self and daughter to be tested due to possible exposure.     Neighbors are getting tested for covid 19 tomorrow per patient.     Patient notified in order to meet qualifications of exposure, the neighbor and daughter would have to be confirmed positive for covid 19. Patient and daughter would then have to be within 6' for 15 minutes or greater.     Patient verbalized understanding.

## 2021-04-15 ENCOUNTER — TELEPHONE (OUTPATIENT)
Dept: FAMILY MEDICINE | Facility: OTHER | Age: 21
End: 2021-04-15

## 2021-04-15 NOTE — TELEPHONE ENCOUNTER
Pt called, requesting covid testing. Pt is not experiencing symptoms and was not in direct contact with anyone with a confirmed positive for covid. Pt reports that her neighbor in the apartment across the alicia has covid but she has not been in direct contact with this person. Pt informed that she does not meet criteria for testing at this time. Call back with symptoms or direct exposure. Pt verbalizes understanding.

## 2021-04-16 ENCOUNTER — OFFICE VISIT (OUTPATIENT)
Dept: FAMILY MEDICINE | Facility: OTHER | Age: 21
End: 2021-04-16
Attending: NURSE PRACTITIONER
Payer: COMMERCIAL

## 2021-04-16 VITALS
HEART RATE: 84 BPM | WEIGHT: 187 LBS | SYSTOLIC BLOOD PRESSURE: 112 MMHG | BODY MASS INDEX: 33.13 KG/M2 | OXYGEN SATURATION: 99 % | RESPIRATION RATE: 18 BRPM | DIASTOLIC BLOOD PRESSURE: 64 MMHG | TEMPERATURE: 98.5 F

## 2021-04-16 DIAGNOSIS — H65.93 BILATERAL NON-SUPPURATIVE OTITIS MEDIA: ICD-10-CM

## 2021-04-16 DIAGNOSIS — R10.2 PELVIC PAIN: Primary | ICD-10-CM

## 2021-04-16 DIAGNOSIS — H61.23 EXCESSIVE CERUMEN IN EAR CANAL, BILATERAL: ICD-10-CM

## 2021-04-16 LAB
ALBUMIN UR-MCNC: NEGATIVE MG/DL
APPEARANCE UR: CLEAR
BACTERIA #/AREA URNS HPF: ABNORMAL /HPF
BILIRUB UR QL STRIP: NEGATIVE
COLOR UR AUTO: YELLOW
GLUCOSE UR STRIP-MCNC: NEGATIVE MG/DL
HGB UR QL STRIP: NEGATIVE
KETONES UR STRIP-MCNC: NEGATIVE MG/DL
LEUKOCYTE ESTERASE UR QL STRIP: ABNORMAL
NITRATE UR QL: NEGATIVE
NON-SQ EPI CELLS #/AREA URNS LPF: ABNORMAL /LPF
PH UR STRIP: 6 PH (ref 5–7)
RBC #/AREA URNS AUTO: ABNORMAL /HPF
SOURCE: ABNORMAL
SP GR UR STRIP: 1.02 (ref 1–1.03)
SPECIMEN SOURCE: NORMAL
UROBILINOGEN UR STRIP-ACNC: 0.2 EU/DL (ref 0.2–1)
WBC #/AREA URNS AUTO: ABNORMAL /HPF
WET PREP SPEC: NORMAL

## 2021-04-16 PROCEDURE — 87591 N.GONORRHOEAE DNA AMP PROB: CPT | Mod: ZL | Performed by: NURSE PRACTITIONER

## 2021-04-16 PROCEDURE — 87491 CHLMYD TRACH DNA AMP PROBE: CPT | Mod: ZL | Performed by: NURSE PRACTITIONER

## 2021-04-16 PROCEDURE — G0463 HOSPITAL OUTPT CLINIC VISIT: HCPCS

## 2021-04-16 PROCEDURE — 81001 URINALYSIS AUTO W/SCOPE: CPT | Mod: ZL | Performed by: NURSE PRACTITIONER

## 2021-04-16 PROCEDURE — 99214 OFFICE O/P EST MOD 30 MIN: CPT | Performed by: NURSE PRACTITIONER

## 2021-04-16 PROCEDURE — 87210 SMEAR WET MOUNT SALINE/INK: CPT | Mod: ZL | Performed by: NURSE PRACTITIONER

## 2021-04-16 RX ORDER — AMOXICILLIN 875 MG
875 TABLET ORAL 2 TIMES DAILY
Qty: 20 TABLET | Refills: 0 | Status: SHIPPED | OUTPATIENT
Start: 2021-04-16 | End: 2021-04-26

## 2021-04-16 RX ORDER — MIRTAZAPINE 7.5 MG/1
7.5 TABLET, FILM COATED ORAL AT BEDTIME
COMMUNITY
Start: 2021-03-26 | End: 2022-08-03

## 2021-04-16 RX ORDER — SERTRALINE HYDROCHLORIDE 25 MG/1
25 TABLET, FILM COATED ORAL DAILY
COMMUNITY
Start: 2021-03-25 | End: 2021-10-08

## 2021-04-16 ASSESSMENT — ANXIETY QUESTIONNAIRES
1. FEELING NERVOUS, ANXIOUS, OR ON EDGE: NEARLY EVERY DAY
IF YOU CHECKED OFF ANY PROBLEMS ON THIS QUESTIONNAIRE, HOW DIFFICULT HAVE THESE PROBLEMS MADE IT FOR YOU TO DO YOUR WORK, TAKE CARE OF THINGS AT HOME, OR GET ALONG WITH OTHER PEOPLE: EXTREMELY DIFFICULT
5. BEING SO RESTLESS THAT IT IS HARD TO SIT STILL: NEARLY EVERY DAY
6. BECOMING EASILY ANNOYED OR IRRITABLE: NEARLY EVERY DAY
2. NOT BEING ABLE TO STOP OR CONTROL WORRYING: NEARLY EVERY DAY
3. WORRYING TOO MUCH ABOUT DIFFERENT THINGS: NEARLY EVERY DAY
7. FEELING AFRAID AS IF SOMETHING AWFUL MIGHT HAPPEN: NEARLY EVERY DAY
GAD7 TOTAL SCORE: 21

## 2021-04-16 ASSESSMENT — PATIENT HEALTH QUESTIONNAIRE - PHQ9
5. POOR APPETITE OR OVEREATING: NEARLY EVERY DAY
SUM OF ALL RESPONSES TO PHQ QUESTIONS 1-9: 21

## 2021-04-16 ASSESSMENT — PAIN SCALES - GENERAL: PAINLEVEL: EXTREME PAIN (8)

## 2021-04-16 NOTE — PATIENT INSTRUCTIONS
Assessment & Plan     1. Pelvic pain  All tests are normal  - *UA reflex to Microscopic and Culture - [facility]  - Wet prep  - GC/Chlamydia by PCR - [facility] - pending  - Urine Microscopic    2. Bilateral non-suppurative otitis media  - amoxicillin (AMOXIL) 875 MG tablet; Take 1 tablet (875 mg) by mouth 2 times daily for 10 days  Dispense: 20 tablet; Refill: 0    3. Excessive cerumen in ear canal, bilateral  For ear cleaning.   - OTOLARYNGOLOGY REFERRAL      Tiara Hidalgo,   Certified Adult Nurse Practitioner  [phone]

## 2021-04-16 NOTE — NURSING NOTE
"Chief Complaint   Patient presents with     Ear Problem       Initial /64 (BP Location: Left arm, Patient Position: Sitting, Cuff Size: Adult Regular)   Pulse 84   Temp 98.5  F (36.9  C) (Tympanic)   Resp 18   Wt 84.8 kg (187 lb)   SpO2 99%   BMI 33.13 kg/m   Estimated body mass index is 33.13 kg/m  as calculated from the following:    Height as of 3/18/21: 1.6 m (5' 3\").    Weight as of this encounter: 84.8 kg (187 lb).  Medication Reconciliation: complete  Nila Payne LPN  "

## 2021-04-17 ASSESSMENT — ANXIETY QUESTIONNAIRES: GAD7 TOTAL SCORE: 21

## 2021-04-21 ENCOUNTER — NURSE TRIAGE (OUTPATIENT)
Dept: FAMILY MEDICINE | Facility: OTHER | Age: 21
End: 2021-04-21

## 2021-04-21 NOTE — TELEPHONE ENCOUNTER
"Call from patient reporting sinus congestion and headaches, difficulty breathing and pressure in chest x 3 days. Patient has a history of asthma reports the difficulty breathing and pressure in chest does not feel like asthma.     See protocol for details.     Per protocol patient has been advised to go to the ED/ to be evaluated due to symptoms of difficulty breathing at rest and upon exertion along with chest pain/pressure.     Patient verbalized understanding, will contact sister for transportation.       Reason for Disposition    MODERATE difficulty breathing (e.g., speaks in phrases, SOB even at rest, pulse 100-120) of new onset or worse than normal    Additional Information    Negative: Chest pain    Negative: Wheezing (high pitched whistling sound) and previous asthma attacks or use of asthma medicines    Negative: Difficulty breathing and only present when coughing    Negative: Difficulty breathing and only from stuffy or runny nose    Negative: Breathing stopped and hasn't returned    Negative: Choking on something    Negative: SEVERE difficulty breathing (e.g., struggling for each breath, speaks in single words, pulse > 120)    Negative: Bluish (or gray) lips or face    Negative: Difficult to awaken or acting confused (e.g., disoriented, slurred speech)    Negative: Passed out (i.e., fainted, collapsed and was not responding)    Negative: Wheezing started suddenly after medicine, an allergic food, or bee sting    Negative: Stridor    Negative: Slow, shallow and weak breathing    Negative: Sounds like a life-threatening emergency to the triager    Answer Assessment - Initial Assessment Questions  1. RESPIRATORY STATUS: \"Describe your breathing?\" (e.g., wheezing, shortness of breath, unable to speak, severe coughing)       Shortness of breath at rest and upon exertion    2. ONSET: \"When did this breathing problem begin?\"       X 3 days     3. PATTERN \"Does the difficult breathing come and go, or has it " "been constant since it started?\"       Constant     4. SEVERITY: \"How bad is your breathing?\" (e.g., mild, moderate, severe)     - MILD: No SOB at rest, mild SOB with walking, speaks normally in sentences, can lay down, no retractions, pulse < 100.     - MODERATE: SOB at rest, SOB with minimal exertion and prefers to sit, cannot lie down flat, speaks in phrases, mild retractions, audible wheezing, pulse 100-120.     - SEVERE: Very SOB at rest, speaks in single words, struggling to breathe, sitting hunched forward, retractions, pulse > 120       Moderate    5. RECURRENT SYMPTOM: \"Have you had difficulty breathing before?\" If so, ask: \"When was the last time?\" and \"What happened that time?\"       No     6. CARDIAC HISTORY: \"Do you have any history of heart disease?\" (e.g., heart attack, angina, bypass surgery, angioplasty)       No     7. LUNG HISTORY: \"Do you have any history of lung disease?\"  (e.g., pulmonary embolus, asthma, emphysema)      Asthma     8. CAUSE: \"What do you think is causing the breathing problem?\"       Unknown     9. OTHER SYMPTOMS: \"Do you have any other symptoms? (e.g., dizziness, runny nose, cough, chest pain, fever)      Chest pressure, lightheaded, sinus congestion and body aches (legs and back).         Patient states she is currently on antibiotics (Amoxicillin) for ear infection    10. PREGNANCY: \"Is there any chance you are pregnant?\" \"When was your last menstrual period?\"        No     11. TRAVEL: \"Have you traveled out of the country in the last month?\" (e.g., travel history, exposures)        No    Protocols used: BREATHING DIFFICULTY-A-[place]      "

## 2021-04-22 ENCOUNTER — VIRTUAL VISIT (OUTPATIENT)
Dept: FAMILY MEDICINE | Facility: OTHER | Age: 21
End: 2021-04-22
Attending: NURSE PRACTITIONER
Payer: COMMERCIAL

## 2021-04-22 ENCOUNTER — TELEPHONE (OUTPATIENT)
Dept: FAMILY MEDICINE | Facility: OTHER | Age: 21
End: 2021-04-22

## 2021-04-22 DIAGNOSIS — J98.9 REACTIVE AIRWAY DISEASE THAT IS NOT ASTHMA: ICD-10-CM

## 2021-04-22 DIAGNOSIS — U07.1 COVID-19: Primary | ICD-10-CM

## 2021-04-22 PROCEDURE — 99213 OFFICE O/P EST LOW 20 MIN: CPT | Mod: 95 | Performed by: NURSE PRACTITIONER

## 2021-04-22 RX ORDER — ALBUTEROL SULFATE 90 UG/1
2 AEROSOL, METERED RESPIRATORY (INHALATION) EVERY 6 HOURS PRN
Qty: 18 G | Refills: 3 | Status: SHIPPED | OUTPATIENT
Start: 2021-04-22 | End: 2022-08-18

## 2021-04-22 RX ORDER — BUDESONIDE AND FORMOTEROL FUMARATE DIHYDRATE 80; 4.5 UG/1; UG/1
2 AEROSOL RESPIRATORY (INHALATION) 2 TIMES DAILY
Qty: 10.2 G | Refills: 3 | Status: SHIPPED | OUTPATIENT
Start: 2021-04-22 | End: 2022-08-18 | Stop reason: SINTOL

## 2021-04-22 NOTE — TELEPHONE ENCOUNTER
3:09 PM    Reason for Call: Phone Call    Description: pt called she has Covid-19, not feeling well. Wanting to know what she can take for her symptoms. Please call pt.     Was an appointment offered for this call? No  If yes : Appointment type              Date    Preferred method for responding to this message: Telephone Call  What is your phone number ? [phone]    If we cannot reach you directly, may we leave a detailed response at the number you provided? Yes    Can this message wait until your PCP/provider returns, if available today? YES, No, Provider is in    [facility]

## 2021-04-22 NOTE — PROGRESS NOTES
Meg is a 21 year old who is being evaluated via a billable telephone visit.      What phone number would you like to be contacted at? [phone]  How would you like to obtain your AVS? Mail a copy    Assessment & Plan     1. COVID-19  Tylenol as needed  Symptomatic cares  - quarantine guidelines reviewed.   - albuterol (PROAIR HFA/PROVENTIL HFA/VENTOLIN HFA) 108 (90 Base) MCG/ACT inhaler; Inhale 2 puffs into the lungs every 6 hours as needed for shortness of breath / dyspnea or wheezing  Dispense: 18 g; Refill: 3    2. Reactive airway disease that is not asthma  - albuterol (PROAIR HFA/PROVENTIL HFA/VENTOLIN HFA) 108 (90 Base) MCG/ACT inhaler; Inhale 2 puffs into the lungs every 6 hours as needed for shortness of breath / dyspnea or wheezing  Dispense: 18 g; Refill: 3  - budesonide-formoterol (SYMBICORT) 80-4.5 MCG/ACT Inhaler; Inhale 2 puffs into the lungs 2 times daily  Dispense: 10.2 g; Refill: 3        Follow-up if any worsening.     Tiara Hidalgo NP  [facility]   Meg is a 21 year old who presents for the following health issues     HPI       Concern for COVID-19  About how many days ago did these symptoms start? 4/20/21  Is this your first visit for this illness? Yes  In the 14 days before your symptoms started, have you had close contact with someone with COVID-19 (Coronavirus)? Positive covid test at [facility] 4/21/21  Do you have a fever or chills? Yes, I felt feverish or had chills  Are you having new or worsening difficulty breathing? Yes   Please describe what kind of difficulty you are having breathing:Moderate dyspnea (short of breath with ADLs, shortness of breath that limits the ability to walk up one flight of stairs without needing to rest)  Do you have new or worsening cough? Yes, it's a dry cough.   Have you had any new or unexplained body aches? YES    Have you experienced any of the following NEW symptoms?    Headache: YES    Sore throat:  YES    Loss of taste or smell: No    Chest pain: YES    Diarrhea: No    Rash: No  What treatments have you tried? none  Who do you live with? Daughter and boyfriend  Are you, or a household member, a healthcare worker or a ? No  Do you live in a nursing home, group home, or shelter? No  Do you have a way to get food/medications if quarantined? Only has dad or sister to depend on         Review of Systems   Constitutional, HEENT, cardiovascular, pulmonary, gi and gu systems are negative, except as otherwise noted.      Objective           Vitals:  No vitals were obtained today due to virtual visit.    Physical Exam   alert and no distress  PSYCH: Alert and oriented times 3; coherent speech, normal   rate and volume, able to articulate logical thoughts, able   to abstract reason, no tangential thoughts, no hallucinations   or delusions  Her affect is normal and pleasant  RESP: No cough, no audible wheezing, able to talk in full sentences  Remainder of exam unable to be completed due to telephone visits                Phone call duration: 11 minutes

## 2021-05-03 ENCOUNTER — TELEPHONE (OUTPATIENT)
Dept: FAMILY MEDICINE | Facility: OTHER | Age: 21
End: 2021-05-03

## 2021-05-03 NOTE — TELEPHONE ENCOUNTER
Pt called, reports that she tested positive for covid on 4/21. Pt is feeling better and is done with quarantine. She is wondering if she is okay to receive covid vaccine or if she should wait. Please advise. Thank you!

## 2021-05-11 ENCOUNTER — NURSE TRIAGE (OUTPATIENT)
Dept: FAMILY MEDICINE | Facility: OTHER | Age: 21
End: 2021-05-11

## 2021-05-11 ENCOUNTER — TRANSFERRED RECORDS (OUTPATIENT)
Dept: HEALTH INFORMATION MANAGEMENT | Facility: CLINIC | Age: 21
End: 2021-05-11

## 2021-05-11 LAB
ALT SERPL-CCNC: 15 IU/L (ref 6–31)
AST SERPL-CCNC: 16 IU/L (ref 10–40)
CREAT SERPL-MCNC: 0.81 MG/DL (ref 0.4–1)
GFR SERPL CREATININE-BSD FRML MDRD: >60 ML/MIN/1.73M2
GLUCOSE SERPL-MCNC: 91 MG/DL (ref 70–99)
POTASSIUM SERPL-SCNC: 3.6 MEQ/L (ref 3.4–5.1)

## 2021-05-11 NOTE — TELEPHONE ENCOUNTER
"Pt callig and had Covid vaccine on 5.6.2021.Had like an allergic reaction then her chest got flush,tongue felt funny and her throat,BP was high and HR when she got the vaccine that day per pt.She received the Phizer.She had chest pressure then and has not left since.She has chest pain and pressure in right neck at this time. Did have back pain at one point too. She has been dizzy when walking.Chest pain worsens with excerption and moves around her chest.Feels heavy and describes like gravity is trying to pull her down.Has been constant since 11-12pm today.Advised ED and to have a  if s/s worsen call 911.She verbalized understanding.    Please note.      Jessica Encarnacion RN      Reason for Disposition    [1] Chest pain lasts > 5 minutes AND [2] described as crushing, pressure-like, or heavy    Additional Information    Negative: Severe difficulty breathing (e.g., struggling for each breath, speaks in single words)    Negative: Difficult to awaken or acting confused (e.g., disoriented, slurred speech)    Negative: Shock suspected (e.g., cold/pale/clammy skin, too weak to stand, low BP, rapid pulse)    Negative: [1] Chest pain lasts > 5 minutes AND [2] history of heart disease  (i.e., heart attack, bypass surgery, angina, angioplasty, CHF; not just a heart murmur)    Answer Assessment - Initial Assessment Questions  1. LOCATION: \"Where does it hurt?\"        Chest pain in the middle this moves around and right now it is on left chest and right side neck pain-had covid vaccine in right arm 5.6.2021. Phizer  2. RADIATION: \"Does the pain go anywhere else?\" (e.g., into neck, jaw, arms, back)      Neck right side-pressure, feels like in back on left side below ribcage   3. ONSET: \"When did the chest pain begin?\" (Minutes, hours or days)       Few days ago  4. PATTERN \"Does the pain come and go, or has it been constant since it started?\"  \"Does it get worse with exertion?\"       Constant since shot-but have pressure from " "Covid before that  5. DURATION: \"How long does it last\" (e.g., seconds, minutes, hours)     Since the vaccine  6. SEVERITY: \"How bad is the pain?\"  (e.g., Scale 1-10; mild, moderate, or severe)     - MILD (1-3): doesn't interfere with normal activities      - MODERATE (4-7): interferes with normal activities or awakens from sleep     - SEVERE (8-10): excruciating pain, unable to do any normal activities          6/10 sharp and achey  7. CARDIAC RISK FACTORS: \"Do you have any history of heart problems or risk factors for heart disease?\" (e.g., prior heart attack, angina; high blood pressure, diabetes, being overweight, high cholesterol, smoking, or strong family history of heart disease)      no  8. PULMONARY RISK FACTORS: \"Do you have any history of lung disease?\"  (e.g., blood clots in lung, asthma, emphysema, birth control pills)     Asthma,depo shot  9. CAUSE: \"What do you think is causing the chest pain?\"     Not sure  10. OTHER SYMPTOMS: \"Do you have any other symptoms?\" (e.g., dizziness, nausea, vomiting, sweating, fever, difficulty breathing, cough)       Little dizzy when moving around,pain get worse when moving aroundand when she got the shot, a scratchy cough  11. PREGNANCY: \"Is there any chance you are pregnant?\" \"When was your last menstrual period?\"       no    Protocols used: CHEST PAIN-A-AH      "

## 2021-05-21 ENCOUNTER — ALLIED HEALTH/NURSE VISIT (OUTPATIENT)
Dept: FAMILY MEDICINE | Facility: OTHER | Age: 21
End: 2021-05-21
Attending: NURSE PRACTITIONER
Payer: COMMERCIAL

## 2021-05-21 DIAGNOSIS — Z30.42 SURVEILLANCE FOR DEPO-PROVERA CONTRACEPTION: Primary | ICD-10-CM

## 2021-05-21 PROCEDURE — 96372 THER/PROPH/DIAG INJ SC/IM: CPT

## 2021-05-21 RX ADMIN — MEDROXYPROGESTERONE ACETATE 150 MG: 150 INJECTION, SUSPENSION INTRAMUSCULAR at 15:10

## 2021-05-21 NOTE — PROGRESS NOTES
Clinic Administered Medication Documentation      Depo Provera Documentation    URINE HCG: not indicated    Depo-Provera Standing Order inclusion/exclusion criteria reviewed.   Patient meets: inclusion criteria     BP: Data Unavailable  LAST PAP/EXAM:   Lab Results   Component Value Date    PAP NIL 01/19/2021       Prior to injection, verified patient identity using patient's name and date of birth. Medication was administered. Please see MAR and medication order for additional information.     Was entire vial of medication used? Yes  Vial/Syringe: Single dose vial  Expiration Date:  11/2021    Patient instructed to remain in clinic for 15 minutes.  NEXT INJECTION DUE: 8/6/21 - 8/20/21       Please read the healthy family handout that you were given and share it with your family. Please compare this printed medication list with your medications at home to be sure they are the same. If you have any medications that are different please contact us immediately at [phone]. Also review your allergies that we have listed, these may also include medications that you have not been able to tolerate, make sure everything listed is correct. If you have any allergies that are different please contact us immediately at [phone].

## 2021-08-10 NOTE — PROGRESS NOTES
"    Assessment & Plan     1. Bipolar affective disorder, currently depressed, moderate (H)  Continue zoloft  Continue working with psychiatry and counseling as scheduled.     2. Major depressive disorder, recurrent episode, moderate (H)  As above         BMI:   Estimated body mass index is 32.43 kg/m  as calculated from the following:    Height as of this encounter: 1.6 m (5' 3\").    Weight as of this encounter: 83.1 kg (183 lb 1.6 oz).   Weight management plan: Discussed healthy diet and exercise guidelines      Follow-up in 3-6 months as needed    Tiara Hidalgo, NP  [facility] REHAN Weaver is a 21 year old who presents for the following health issues     HPI   Advanced care planning  Hep c  Preventive care visit       Depression and Anxiety Follow-Up    How are you doing with your depression since your last visit? No change    How are you doing with your anxiety since your last visit?  No change    Are you having other symptoms that might be associated with depression or anxiety? Yes:  panic attacks     Have you had a significant life event? No     Do you have any concerns with your use of alcohol or other drugs? No    Social History     Tobacco Use     Smoking status: Never Smoker     Smokeless tobacco: Never Used   Substance Use Topics     Alcohol use: No     Drug use: No     PHQ 1/25/2021 4/16/2021 8/18/2021   PHQ-9 Total Score 26 21 21   Q9: Thoughts of better off dead/self-harm past 2 weeks More than half the days Not at all Several days   F/U: Thoughts of suicide or self-harm - - -   F/U: Self harm-plan - - -   F/U: Self-harm action - - -   F/U: Safety concerns - - -     RUFINA-7 SCORE 1/25/2021 4/16/2021 8/18/2021   Total Score 21 21 21     Last PHQ-9 8/18/2021   1.  Little interest or pleasure in doing things 3   2.  Feeling down, depressed, or hopeless 3   3.  Trouble falling or staying asleep, or sleeping too much 3   4.  Feeling tired or having little energy 2 "   5.  Poor appetite or overeating 3   6.  Feeling bad about yourself 3   7.  Trouble concentrating 3   8.  Moving slowly or restless 0   Q9: Thoughts of better off dead/self-harm past 2 weeks 1   PHQ-9 Total Score 21   Difficulty at work, home, or with people Extremely dIfficult   In the past two weeks have you had thoughts of suicide or self harm? -   Do you have concerns about your personal safety or the safety of others? -   In the past 2 weeks have you thought about a plan or had intention to harm yourself? -   In the past 2 weeks have you acted on these thoughts in any way? -     RUFINA-7  8/18/2021   1. Feeling nervous, anxious, or on edge 3   2. Not being able to stop or control worrying 3   3. Worrying too much about different things 3   4. Trouble relaxing 3   5. Being so restless that it is hard to sit still 3   6. Becoming easily annoyed or irritable 3   7. Feeling afraid, as if something awful might happen 3   RUFINA-7 Total Score 21   If you checked any problems, how difficult have they made it for you to do your work, take care of things at home, or get along with other people? Extremely difficult       Suicide Assessment Five-step Evaluation and Treatment (SAFE-T)        How many servings of fruits and vegetables do you eat daily?  2-3    On average, how many sweetened beverages do you drink each day (Examples: soda, juice, sweet tea, etc.  Do NOT count diet or artificially sweetened beverages)?   0    How many days per week do you exercise enough to make your heart beat faster? 6    How many minutes a day do you exercise enough to make your heart beat faster? 30 - 60    How many days per week do you miss taking your medication? 0    Concern - Lump under chin   Onset: 7/29/21  Description: lump under chin  Intensity: was pain now none   Progression of Symptoms:  improving  Accompanying Signs & Symptoms: thinks swollen gland   Previous history of similar problem: no  Precipitating factors:        Worsened  by: unknown   Alleviating factors:        Improved by: nothing went away on own   Therapies tried and outcome: None      Patient Active Problem List   Diagnosis     Tinnitus     Major depressive disorder, recurrent episode, moderate (H)     H/O methicillin resistant Staphylococcus aureus infection     Anxiety state     Bipolar disorder, in full remission, most recent episode depressed (H)     Mild intellectual disabilities     Menorrhagia with irregular cycle     Encounter for insertion of intrauterine contraceptive device (IUD)     Past Surgical History:   Procedure Laterality Date     HEAD & NECK SURGERY      laser on face     LAPAROSCOPIC CYSTECTOMY OVARIAN (ONCOLOGY) Left 8/22/2019    Procedure: LAPAROSCOPY TREAT LEFT OVARIAN CYST, MULTIPLE ASPIRATIONS LEFT OVARIAN CYST;  Surgeon: Frow, David Franke, MD;  Location: [facility]     [facility] surgery birthmark to left side of face         Social History     Tobacco Use     Smoking status: Never Smoker     Smokeless tobacco: Never Used   Substance Use Topics     Alcohol use: No     Family History   Problem Relation Age of Onset     Bipolar Disorder Mother      Depression Mother      Heart Disease Father      Diabetes Other      Myocardial Infarction Paternal Grandfather      Lung Cancer Paternal Grandfather      Other - See Comments Paternal Aunt         mulitiple sclerosis     Breast Cancer Maternal Grandfather         x 2         Current Outpatient Medications   Medication Sig Dispense Refill     albuterol (PROAIR HFA/PROVENTIL HFA/VENTOLIN HFA) 108 (90 Base) MCG/ACT inhaler Inhale 2 puffs into the lungs every 6 hours as needed for shortness of breath / dyspnea or wheezing 18 g 3     budesonide-formoterol (SYMBICORT) 80-4.5 MCG/ACT Inhaler Inhale 2 puffs into the lungs 2 times daily 10.2 g 3     diclofenac (VOLTAREN) 1 % topical gel Apply 4 g topically       LORazepam (ATIVAN) 0.5 MG tablet TAKE 1 TABLET BY MOUTH ONCE DAILY AS NEEDED FOR ANXIETY       mirtazapine (REMERON)  "7.5 MG tablet Take 7.5 mg by mouth At Bedtime       sertraline (ZOLOFT) 25 MG tablet Take 25 mg by mouth daily       sertraline (ZOLOFT) 50 MG tablet TAKE 1 TABLET BY MOUTH ONCE DAILY. TAKE IN ADITION TO A 25 MG TABLET FOR A TOTAL DAILY DOSE OF 75MG       Allergies   Allergen Reactions     Cymbalta      Lexapro [Escitalopram] Other (See Comments)     hypersensitivity to touch      Recent Labs   Lab Test 05/11/21  0000 03/24/21  0000 01/29/21  0000 01/19/21  1428 12/25/20  0000 06/03/20  1047 05/08/18  1027 06/22/17  2357   A1C  --   --   --  5.1  --   --   --   --    ALT 15  --  15  --  14 24   < >  --    CR 0.81 0.86 0.77  --  0.81 0.68   < > 0.41*   GFRESTIMATED >60 >60 >60  --  >60 >90   < > >90  Non  GFR Calc     GFRESTBLACK  --   --   --   --   --  >90  --  >90  African American GFR Calc     POTASSIUM 3.6 3.4 3.0*  --  3.6 3.6   < > 3.8   TSH  --   --  4.17* 1.46  --   --   --   --     < > = values in this interval not displayed.      BP Readings from Last 3 Encounters:   08/18/21 122/62   04/16/21 112/64   03/18/21 116/62    Wt Readings from Last 3 Encounters:   08/18/21 83.1 kg (183 lb 1.6 oz)   04/16/21 84.8 kg (187 lb)   03/18/21 81.6 kg (180 lb)                      Review of Systems   Constitutional, HEENT, cardiovascular, pulmonary, gi and gu systems are negative, except as otherwise noted.      Objective    /62 (BP Location: Right arm, Patient Position: Chair, Cuff Size: Adult Regular)   Pulse 75   Temp 98.3  F (36.8  C) (Tympanic)   Resp 16   Ht 1.6 m (5' 3\")   Wt 83.1 kg (183 lb 1.6 oz)   SpO2 99%   BMI 32.43 kg/m    Body mass index is 32.43 kg/m .  Physical Exam   GENERAL: healthy, alert and no distress  NECK: no adenopathy, no asymmetry, masses, or scars and thyroid normal to palpation  RESP: lungs clear to auscultation - no rales, rhonchi or wheezes  CV: regular rate and rhythm, normal S1 S2, no S3 or S4, no murmur, click or rub, no peripheral edema and peripheral " pulses strong  MS: no gross musculoskeletal defects noted, no edema  PSYCH: mentation appears normal, affect normal/bright

## 2021-08-17 ENCOUNTER — ALLIED HEALTH/NURSE VISIT (OUTPATIENT)
Dept: FAMILY MEDICINE | Facility: OTHER | Age: 21
End: 2021-08-17
Attending: NURSE PRACTITIONER
Payer: COMMERCIAL

## 2021-08-17 DIAGNOSIS — Z30.42 SURVEILLANCE FOR DEPO-PROVERA CONTRACEPTION: Primary | ICD-10-CM

## 2021-08-17 PROCEDURE — 96372 THER/PROPH/DIAG INJ SC/IM: CPT

## 2021-08-17 RX ADMIN — MEDROXYPROGESTERONE ACETATE 150 MG: 150 INJECTION, SUSPENSION INTRAMUSCULAR at 13:36

## 2021-08-17 NOTE — PROGRESS NOTES
Clinic Administered Medication Documentation    Administrations This Visit     medroxyPROGESTERone (DEPO-PROVERA) injection 150 mg     Admin Date  08/17/2021 Action  Given Dose  150 mg Route  Intramuscular Site   Administered By  Angie Mckoy LPN    Ordering Provider: Zac Vega APRN CNM    Patient Supplied?: No                  Depo Provera Documentation    URINE HCG: not indicated    Depo-Provera Standing Order inclusion/exclusion criteria reviewed.   Patient meets: inclusion criteria     BP: Data Unavailable  LAST PAP/EXAM:   Lab Results   Component Value Date    PAP NIL 01/19/2021       Prior to injection, verified patient identity using patient's name and date of birth. Medication was administered. Please see MAR and medication order for additional information.     Was entire vial of medication used? Yes  Vial/Syringe: Single dose vial  Expiration Date:  3/31/23    Patient instructed to stay in clinic after the injection but patient declined.  NEXT INJECTION DUE: 11/2/21 - 11/16/21

## 2021-08-18 ENCOUNTER — TELEPHONE (OUTPATIENT)
Dept: FAMILY MEDICINE | Facility: OTHER | Age: 21
End: 2021-08-18

## 2021-08-18 ENCOUNTER — OFFICE VISIT (OUTPATIENT)
Dept: FAMILY MEDICINE | Facility: OTHER | Age: 21
End: 2021-08-18
Attending: NURSE PRACTITIONER
Payer: COMMERCIAL

## 2021-08-18 VITALS
RESPIRATION RATE: 16 BRPM | WEIGHT: 183.1 LBS | HEIGHT: 63 IN | BODY MASS INDEX: 32.44 KG/M2 | DIASTOLIC BLOOD PRESSURE: 62 MMHG | HEART RATE: 75 BPM | TEMPERATURE: 98.3 F | SYSTOLIC BLOOD PRESSURE: 122 MMHG | OXYGEN SATURATION: 99 %

## 2021-08-18 DIAGNOSIS — F33.1 MAJOR DEPRESSIVE DISORDER, RECURRENT EPISODE, MODERATE (H): ICD-10-CM

## 2021-08-18 DIAGNOSIS — F31.32 BIPOLAR AFFECTIVE DISORDER, CURRENTLY DEPRESSED, MODERATE (H): Primary | ICD-10-CM

## 2021-08-18 PROCEDURE — G0463 HOSPITAL OUTPT CLINIC VISIT: HCPCS

## 2021-08-18 PROCEDURE — 99213 OFFICE O/P EST LOW 20 MIN: CPT | Performed by: NURSE PRACTITIONER

## 2021-08-18 RX ORDER — LORAZEPAM 0.5 MG/1
TABLET ORAL
COMMUNITY
Start: 2021-05-25 | End: 2023-01-19

## 2021-08-18 ASSESSMENT — ANXIETY QUESTIONNAIRES
2. NOT BEING ABLE TO STOP OR CONTROL WORRYING: NEARLY EVERY DAY
GAD7 TOTAL SCORE: 21
6. BECOMING EASILY ANNOYED OR IRRITABLE: NEARLY EVERY DAY
7. FEELING AFRAID AS IF SOMETHING AWFUL MIGHT HAPPEN: NEARLY EVERY DAY
5. BEING SO RESTLESS THAT IT IS HARD TO SIT STILL: NEARLY EVERY DAY
3. WORRYING TOO MUCH ABOUT DIFFERENT THINGS: NEARLY EVERY DAY
1. FEELING NERVOUS, ANXIOUS, OR ON EDGE: NEARLY EVERY DAY
IF YOU CHECKED OFF ANY PROBLEMS ON THIS QUESTIONNAIRE, HOW DIFFICULT HAVE THESE PROBLEMS MADE IT FOR YOU TO DO YOUR WORK, TAKE CARE OF THINGS AT HOME, OR GET ALONG WITH OTHER PEOPLE: EXTREMELY DIFFICULT
4. TROUBLE RELAXING: NEARLY EVERY DAY

## 2021-08-18 ASSESSMENT — PAIN SCALES - GENERAL: PAINLEVEL: NO PAIN (0)

## 2021-08-18 ASSESSMENT — MIFFLIN-ST. JEOR: SCORE: 1564.67

## 2021-08-18 ASSESSMENT — PATIENT HEALTH QUESTIONNAIRE - PHQ9: SUM OF ALL RESPONSES TO PHQ QUESTIONS 1-9: 21

## 2021-08-18 NOTE — TELEPHONE ENCOUNTER
11:39 AM    Reason for Call: Phone Call    Description: Crystal from [facility] calling about a Medical Opinion Form that was filled out on or around June 24, 2021 needing this form faxed to them at [phone] patient does have a copy but [facility] does not accept a copy from the patient they need this form fax from [facility] office.     Was an appointment offered for this call? No  If yes : Appointment type              Date    Preferred method for responding to this message: Telephone Call  What is your phone number ? Phone Crystal [facility] [phone], Fax [phone]    If we cannot reach you directly, may we leave a detailed response at the number you provided? Yes    Can this message wait until your PCP/provider returns, if available today?  Sonia Orosco

## 2021-08-18 NOTE — NURSING NOTE
"Chief Complaint   Patient presents with     Mass     uncer chin       Initial /62 (BP Location: Right arm, Patient Position: Chair, Cuff Size: Adult Regular)   Pulse 75   Temp 98.3  F (36.8  C) (Tympanic)   Resp 16   Ht 1.6 m (5' 3\")   Wt 83.1 kg (183 lb 1.6 oz)   SpO2 99%   BMI 32.43 kg/m   Estimated body mass index is 32.43 kg/m  as calculated from the following:    Height as of this encounter: 1.6 m (5' 3\").    Weight as of this encounter: 83.1 kg (183 lb 1.6 oz).  Medication Reconciliation: complete  Pamela M. Lechevalier, LPN    "

## 2021-08-19 ASSESSMENT — ANXIETY QUESTIONNAIRES: GAD7 TOTAL SCORE: 21

## 2021-10-01 ENCOUNTER — OFFICE VISIT (OUTPATIENT)
Dept: FAMILY MEDICINE | Facility: OTHER | Age: 21
End: 2021-10-01
Attending: FAMILY MEDICINE
Payer: COMMERCIAL

## 2021-10-01 VITALS
OXYGEN SATURATION: 100 % | DIASTOLIC BLOOD PRESSURE: 58 MMHG | BODY MASS INDEX: 30.65 KG/M2 | HEART RATE: 83 BPM | WEIGHT: 173 LBS | TEMPERATURE: 98 F | SYSTOLIC BLOOD PRESSURE: 122 MMHG | HEIGHT: 63 IN

## 2021-10-01 DIAGNOSIS — H61.23 CERUMINOSIS, BILATERAL: ICD-10-CM

## 2021-10-01 DIAGNOSIS — H69.92 DYSFUNCTION OF LEFT EUSTACHIAN TUBE: Primary | ICD-10-CM

## 2021-10-01 PROCEDURE — G0463 HOSPITAL OUTPT CLINIC VISIT: HCPCS

## 2021-10-01 PROCEDURE — 99213 OFFICE O/P EST LOW 20 MIN: CPT | Performed by: FAMILY MEDICINE

## 2021-10-01 RX ORDER — VENLAFAXINE HYDROCHLORIDE 37.5 MG/1
CAPSULE, EXTENDED RELEASE ORAL
COMMUNITY
Start: 2021-09-28 | End: 2022-08-18 | Stop reason: ALTCHOICE

## 2021-10-01 RX ORDER — METHYLPREDNISOLONE 4 MG
TABLET, DOSE PACK ORAL
Qty: 21 TABLET | Refills: 0 | Status: SHIPPED | OUTPATIENT
Start: 2021-10-01 | End: 2022-08-03

## 2021-10-01 ASSESSMENT — MIFFLIN-ST. JEOR: SCORE: 1518.85

## 2021-10-01 ASSESSMENT — PAIN SCALES - GENERAL: PAINLEVEL: EXTREME PAIN (9)

## 2021-10-01 NOTE — NURSING NOTE
"Chief Complaint   Patient presents with     Otalgia       Initial /58 (BP Location: Right arm, Patient Position: Chair, Cuff Size: Adult Regular)   Pulse 83   Temp 98  F (36.7  C) (Tympanic)   Ht 1.6 m (5' 3\")   Wt 78.5 kg (173 lb)   SpO2 100%   BMI 30.65 kg/m   Estimated body mass index is 30.65 kg/m  as calculated from the following:    Height as of this encounter: 1.6 m (5' 3\").    Weight as of this encounter: 78.5 kg (173 lb).  Medication Reconciliation: complete  Sophy Bautista LPN    "

## 2021-10-08 ENCOUNTER — OFFICE VISIT (OUTPATIENT)
Dept: FAMILY MEDICINE | Facility: OTHER | Age: 21
End: 2021-10-08
Attending: NURSE PRACTITIONER
Payer: COMMERCIAL

## 2021-10-08 VITALS
OXYGEN SATURATION: 98 % | DIASTOLIC BLOOD PRESSURE: 74 MMHG | BODY MASS INDEX: 29.94 KG/M2 | WEIGHT: 169 LBS | HEART RATE: 91 BPM | TEMPERATURE: 98.3 F | SYSTOLIC BLOOD PRESSURE: 112 MMHG

## 2021-10-08 DIAGNOSIS — R68.83 CHILLS: Primary | ICD-10-CM

## 2021-10-08 DIAGNOSIS — J06.9 VIRAL URI: ICD-10-CM

## 2021-10-08 DIAGNOSIS — R05.9 COUGH: ICD-10-CM

## 2021-10-08 DIAGNOSIS — J34.89 RHINORRHEA: ICD-10-CM

## 2021-10-08 PROCEDURE — U0005 INFEC AGEN DETEC AMPLI PROBE: HCPCS | Mod: ZL | Performed by: NURSE PRACTITIONER

## 2021-10-08 PROCEDURE — G0463 HOSPITAL OUTPT CLINIC VISIT: HCPCS

## 2021-10-08 PROCEDURE — 99214 OFFICE O/P EST MOD 30 MIN: CPT | Performed by: NURSE PRACTITIONER

## 2021-10-08 RX ORDER — FLUTICASONE PROPIONATE 50 MCG
1 SPRAY, SUSPENSION (ML) NASAL DAILY
Qty: 15.8 ML | Refills: 0 | Status: SHIPPED | OUTPATIENT
Start: 2021-10-08 | End: 2022-08-03

## 2021-10-08 ASSESSMENT — PAIN SCALES - GENERAL: PAINLEVEL: MODERATE PAIN (5)

## 2021-10-08 NOTE — NURSING NOTE
"Chief Complaint   Patient presents with     Otalgia       Initial /74 (BP Location: Right arm, Patient Position: Chair, Cuff Size: Adult Regular)   Pulse 91   Temp 98.3  F (36.8  C) (Tympanic)   Wt 76.7 kg (169 lb)   SpO2 98%   BMI 29.94 kg/m   Estimated body mass index is 29.94 kg/m  as calculated from the following:    Height as of 10/1/21: 1.6 m (5' 3\").    Weight as of this encounter: 76.7 kg (169 lb).  Medication Reconciliation: complete  Sophy Bautista LPN    "

## 2021-10-08 NOTE — PATIENT INSTRUCTIONS
Assessment & Plan       Chills  - Symptomatic COVID-19 Virus (Coronavirus) by PCR Nose    Cough  - Symptomatic COVID-19 Virus (Coronavirus) by PCR Nose    Rhinorrhea  - Symptomatic COVID-19 Virus (Coronavirus) by PCR Nose  - fluticasone (FLONASE) 50 MCG/ACT nasal spray; Spray 1 spray into both nostrils daily    Viral URI  Supportive care      Insure adequate fluid intake  Get plenty of rest  Monitor for temp at home, treat with OTC Tylenol or Ibuprofen per package instruction.  Humidity at home (add bacteriostatic solution to humidifier)  Please return in you do not improve  To UC or ER with persistent, worsening, or concerning symptoms        Nel Vazquez, CNP  [facility]

## 2021-10-08 NOTE — PROGRESS NOTES
Assessment & Plan       Chills  - Symptomatic COVID-19 Virus (Coronavirus) by PCR Nose    Cough  - Symptomatic COVID-19 Virus (Coronavirus) by PCR Nose    Rhinorrhea  - Symptomatic COVID-19 Virus (Coronavirus) by PCR Nose  - fluticasone (FLONASE) 50 MCG/ACT nasal spray; Spray 1 spray into both nostrils daily    Viral URI  Supportive care      Insure adequate fluid intake  Get plenty of rest  Monitor for temp at home, treat with OTC Tylenol or Ibuprofen per package instruction.  Humidity at home (add bacteriostatic solution to humidifier)  Please return in you do not improve  To UC or ER with persistent, worsening, or concerning symptoms        Nel Vazquez, CNP  [facility] [place] REHAN Weaver is a 21 year old who presents for the following health issues         Acute Illness  Acute illness concerns: sore throat, sinus drainage, fatigue   Onset/Duration: seen on first for ear pain,new  sx 10/3/21  Symptoms:  Fever: no  Chills/Sweats: YES  Headache (location?): YES  Sinus Pressure: YES  Conjunctivitis:  no  Ear Pain: YES: right  Rhinorrhea: YES  Congestion: no  Sore Throat: YES  Cough: YES states due to drainage   Wheeze: no  Decreased Appetite: no  Nausea: no  Vomiting: no  Diarrhea: no  Dysuria/Freq.: no  Dysuria or Hematuria: no  Fatigue/Achiness: YES  Sick/Strep Exposure: YES- states daughter has been sick with URI, was Dx with Ear infection but was not tested for anything   Therapies tried and outcome: medrol, tylenol, and ibuprofen       Patient Active Problem List   Diagnosis     Tinnitus     Major depressive disorder, recurrent episode, moderate (H)     H/O methicillin resistant Staphylococcus aureus infection     Anxiety state     Bipolar disorder, unspecified (H)     Mild intellectual disabilities     Menorrhagia with irregular cycle     Encounter for insertion of intrauterine contraceptive device (IUD)     Past Surgical History:   Procedure Laterality Date     HEAD & NECK SURGERY       laser on face     LAPAROSCOPIC CYSTECTOMY OVARIAN (ONCOLOGY) Left 8/22/2019    Procedure: LAPAROSCOPY TREAT LEFT OVARIAN CYST, MULTIPLE ASPIRATIONS LEFT OVARIAN CYST;  Surgeon: Frow, David Franke, MD;  Location: [facility]     [facility] surgery birthmark to left side of face         Social History     Tobacco Use     Smoking status: Never Smoker     Smokeless tobacco: Never Used   Substance Use Topics     Alcohol use: No     Family History   Problem Relation Age of Onset     Bipolar Disorder Mother      Depression Mother      Heart Disease Father      Diabetes Other      Myocardial Infarction Paternal Grandfather      Lung Cancer Paternal Grandfather      Other - See Comments Paternal Aunt         mulitiple sclerosis     Breast Cancer Maternal Grandfather         x 2           Current Outpatient Medications   Medication Sig Dispense Refill     albuterol (PROAIR HFA/PROVENTIL HFA/VENTOLIN HFA) 108 (90 Base) MCG/ACT inhaler Inhale 2 puffs into the lungs every 6 hours as needed for shortness of breath / dyspnea or wheezing 18 g 3     budesonide-formoterol (SYMBICORT) 80-4.5 MCG/ACT Inhaler Inhale 2 puffs into the lungs 2 times daily 10.2 g 3     diclofenac (VOLTAREN) 1 % topical gel Apply 4 g topically       LORazepam (ATIVAN) 0.5 MG tablet TAKE 1 TABLET BY MOUTH ONCE DAILY AS NEEDED FOR ANXIETY       methylPREDNISolone (MEDROL DOSEPAK) 4 MG tablet therapy pack Follow Package Directions 21 tablet 0     mirtazapine (REMERON) 7.5 MG tablet Take 7.5 mg by mouth At Bedtime       carbamide peroxide (DEBROX) 6.5 % otic solution Place 5 drops into both ears 2 times daily (Patient not taking: Reported on 10/8/2021) 15 mL 1     venlafaxine (EFFEXOR-XR) 37.5 MG 24 hr capsule TAKE 1 CAPSULE BY MOUTH AT BEDTIME       Allergies   Allergen Reactions     Cymbalta      Lexapro [Escitalopram] Other (See Comments)     hypersensitivity to touch      Recent Labs   Lab Test 05/11/21  0000 03/24/21  0000 01/29/21  0000 01/29/21  0000 01/19/21  4266  12/25/20  0000 12/25/20  0000 08/21/20  0000 06/03/20  1047 05/08/18  1027 06/22/17  2357   A1C  --   --   --   --  5.1  --   --   --   --   --   --    ALT 15  --   --  15  --   --  14   < > 24   < >  --    CR 0.81 0.86   < > 0.77  --    < > 0.81   < > 0.68   < > 0.41*   GFRESTIMATED >60 >60   < > >60  --    < > >60   < > >90   < > >90  Non  GFR Calc     GFRESTBLACK  --   --   --   --   --   --   --   --  >90  --  >90  African American GFR Calc     POTASSIUM 3.6 3.4   < > 3.0*  --    < > 3.6   < > 3.6   < > 3.8   TSH  --   --   --  4.17* 1.46  --   --   --   --    < >  --     < > = values in this interval not displayed.        BP Readings from Last 3 Encounters:   10/08/21 112/74   10/01/21 122/58   08/18/21 122/62    Wt Readings from Last 3 Encounters:   10/08/21 76.7 kg (169 lb)   10/01/21 78.5 kg (173 lb)   08/18/21 83.1 kg (183 lb 1.6 oz)              Review of Systems   Constitutional, HEENT, cardiovascular, pulmonary, gi and gu systems are negative, except as otherwise noted.        Objective    /74 (BP Location: Right arm, Patient Position: Chair, Cuff Size: Adult Regular)   Pulse 91   Temp 98.3  F (36.8  C) (Tympanic)   Wt 76.7 kg (169 lb)   SpO2 98%   BMI 29.94 kg/m    Body mass index is 29.94 kg/m .       Physical Exam   GENERAL: healthy, alert and no distress  EYES: Eyes grossly normal to inspection, PERRL and conjunctivae and sclerae normal  HENT: ear canals and TM's normal, nose and mouth without ulcers or lesions  NECK: no adenopathy, no asymmetry, masses, or scars and thyroid normal to palpation  RESP: lungs clear to auscultation - no rales, rhonchi or wheezes  CV: regular rate and rhythm, normal S1 S2, no S3 or S4, no murmur, click or rub, no peripheral edema and peripheral pulses strong  SKIN: no suspicious lesions or rashes  PSYCH: mentation appears normal, affect normal/bright

## 2021-10-10 LAB — SARS-COV-2 RNA RESP QL NAA+PROBE: NEGATIVE

## 2021-12-10 ENCOUNTER — TELEPHONE (OUTPATIENT)
Dept: FAMILY MEDICINE | Facility: OTHER | Age: 21
End: 2021-12-10
Payer: COMMERCIAL

## 2021-12-10 DIAGNOSIS — Z30.9 CONTRACEPTIVE MANAGEMENT: Primary | ICD-10-CM

## 2021-12-10 NOTE — TELEPHONE ENCOUNTER
She could always take Plan B from the pharmacy as long as she is within that 24-48 hours window of intercourse.  I can't guarantee that she has pregnancy prevention as she is this late with her injection.  Date for pregnancy tests are fine

## 2021-12-10 NOTE — TELEPHONE ENCOUNTER
It would be too early to detect early pregnancy.  She should abstain from sexual activity, pregnancy test later next week, repeat 2 weeks after, if both tests negative and she has abstained from sexual activity, can have her shot then.

## 2021-12-10 NOTE — TELEPHONE ENCOUNTER
Pt updated on below. Updated pt the below medication will prevent ovulation if she has not ovulated. Also will prevent implantation if egg is present.She does not want pregnancy.Updated her that this can be controversial medication to some people.    She states that this is what she wants and is wondering if it will harm her? Updated unsure of S/E and she could ask the pharmacist.    She states she is sorry for asking so many questions.    Any Side effects?    Jessica Encarnacion RN

## 2021-12-10 NOTE — TELEPHONE ENCOUNTER
"Pt of Richy    Pt calling and has an appt for Depo shot for Monday.This is scheduled about 6 weeks late per patient.    She had intercourse last night and now is wondering if Monday when she comes in for her depo shot if that will be \"too early\" to tell if she is pregnant?     She is wondering if the Depo shot still decreases the chance of pregnancy even though she is 6 weeks late on getting the shot? She still has not had her period.    Still get the Depo on Monday?  and would it be too early to detect pregnancy?    Please advise    Please call back [phone]    Jessica Encarnacion RN    "

## 2021-12-16 ENCOUNTER — LAB (OUTPATIENT)
Dept: LAB | Facility: OTHER | Age: 21
End: 2021-12-16
Payer: COMMERCIAL

## 2021-12-16 DIAGNOSIS — Z30.9 CONTRACEPTIVE MANAGEMENT: Primary | ICD-10-CM

## 2021-12-16 LAB — HCG UR QL: NEGATIVE

## 2021-12-16 PROCEDURE — 81025 URINE PREGNANCY TEST: CPT | Mod: ZL

## 2022-03-10 ENCOUNTER — TRANSFERRED RECORDS (OUTPATIENT)
Dept: HEALTH INFORMATION MANAGEMENT | Facility: HOSPITAL | Age: 22
End: 2022-03-10

## 2022-03-10 LAB
C TRACH DNA SPEC QL PROBE+SIG AMP: NOT DETECTED
N GONORRHOEA DNA SPEC QL PROBE+SIG AMP: NOT DETECTED
SPECIMEN DESCRIP: NORMAL
SPECIMEN DESCRIPTION: NORMAL

## 2022-03-15 ENCOUNTER — TELEPHONE (OUTPATIENT)
Dept: FAMILY MEDICINE | Facility: OTHER | Age: 22
End: 2022-03-15
Payer: COMMERCIAL

## 2022-03-15 DIAGNOSIS — K58.2 IRRITABLE BOWEL SYNDROME WITH BOTH CONSTIPATION AND DIARRHEA: Primary | ICD-10-CM

## 2022-03-15 DIAGNOSIS — K58.2 IRRITABLE BOWEL SYNDROME WITH BOTH CONSTIPATION AND DIARRHEA: ICD-10-CM

## 2022-03-15 RX ORDER — POLYETHYLENE GLYCOL 3350 17 G/17G
1 POWDER, FOR SOLUTION ORAL DAILY
Qty: 578 G | Refills: 0 | Status: SHIPPED | OUTPATIENT
Start: 2022-03-15 | End: 2022-08-03

## 2022-03-15 NOTE — TELEPHONE ENCOUNTER
Call from patient requesting a prescription for Miralax. Patient was in to ED, advised to take Miralax.     Requesting a prescription so insurance will cover.     Patient reports hx of IBS. PCP Out of Office, patient requesting to send to [facility].     Please advise.

## 2022-03-16 RX ORDER — POLYETHYLENE GLYCOL 3350 17 G/17G
POWDER, FOR SOLUTION ORAL
Qty: 510 G | Refills: 0 | OUTPATIENT
Start: 2022-03-16

## 2022-07-06 NOTE — DISCHARGE INSTRUCTIONS
Sent pt a anchor.travel-chart message. Светлана Dixon LPN     Thank you for coming to [facility].  If you are concerned or things get worse, don't hesitate to return to the Emergency Room.    Congratulations! Take one day at a time.    Eat small frequent meals.     You need to be drinking at least 64 ounces of water per day.  To figure out your water needs take your weight and divide it by 2.  That is how many ounces of water you need to drink daily.  If you weigh 200 lbs -- 1/2 of that is 100 and therefore you need 100 ounces of water daily---that's almost 13 glasses of water daily.  If you drink any caffeinated beverages, drink one extra glass of water for each cup/can that you drink.          Abdominal Pain and Early Pregnancy    (To rule out ectopic pregnancy or miscarriage)  Our tests show that you are pregnant, but the exact cause of your pain isn t clear.  Some pain and bleeding are common early in pregnancy. Often they stop, and you can go on to have a normal pregnancy and baby. Other times the pain or bleeding can be signs of a miscarriage or ectopic pregnancy. An ectopic pregnancy is a very serious problem. At this time it is unclear if your pregnancy will continue normally, if you will have a miscarriage, or if you could have an ectopic pregnancy. Below is some information about this.  Miscarriage  At this time we don t know whether you will have a miscarriage, or if things will clear up and your pregnancy will continue normally. We understand that this is emotionally difficult. There is little we can say to change the way you feel. But understand that miscarriages are common.  About 1 or 2 out of every 10 pregnancies end this way. Some end even before you know you are pregnant. This happens for a number of reasons, and usually we never figure out why. It s important you know that it is not your fault. It didn t happen because you did anything wrong.  Having sex or exercising does not cause a miscarriage. These activities are usually safe  unless you have pain or bleeding or your doctor tells you to stop. Even minor falls won t cause a miscarriage. Miscarriages happen because things were not developing as they were supposed to. No medicine can prevent a miscarriage.  Ectopic pregnancy  In a normal pregnancy, the fertilized egg attaches to the wall of the womb (uterus). In an ectopic or tubal pregnancy, the fertilized egg attaches outside the uterus, usually in the fallopian tube. Very rarely, the egg attaches to an ovary or somewhere else in the abdomen. An ectopic pregnancy is much less common than a miscarriage, but it is very serious. The baby cannot survive, and as it grows it can rupture the tube. This can cause internal bleeding and even death. Risk factors for an ectopic are:    An ectopic pregnancy in the past    Pelvic inflammatory disease, or PID    Endometriosis    Smoking    An IUD  Additional tests  Because we don t know what s causing your symptoms, you will need more tests to help your doctor figure out what the problem is. You may need:  Ultrasound  An ultrasound can usually find a normal pregnancy as early as 4 to 5 weeks along. If the ultrasound does not show the baby inside the uterus, it means that:    You have a normal pregnancy less than 4 weeks along, or    You are having or recently had a miscarriage, or    You have an ectopic pregnancy  Quantitative HCG  This test measures the amount of a pregnancy hormone in your blood. Comparing today's test result to a repeat test in 2 days will show whether you have a normal pregnancy.  Laparoscopy  This is a type of surgery. The doctor will put a tube with a light inside your belly (abdomen) to look directly at your pelvic organs. This test is used when it is not safe to wait 2 days for blood test results.     Important information  If you do have an ectopic pregnancy, there is a small chance that the growing fetus can tear the fallopian tube. This can cause severe internal bleeding. If  this happens, you may have:    Sudden severe pain in your lower abdomen    Vaginal bleeding    Weakness, dizziness, and sometimes fainting  If any of these symptoms occur:    Call 911 or return immediately to the hospital.    Do not drive yourself.    Do not go to your doctor's office or to a clinic - go to the hospital.      Home care  Follow these guidelines to help care for yourself at home:    Rest until your next exam. Don t do anything strenuous.    Eat a light diet with foods that are easy to digest.    Follow-up care  Follow up with your doctor for repeat blood testing. If you were told to have a repeat blood test in 2 days, it s important to get it done.  If you had an X-ray or ultrasound, a radiologist will review it. You will be told of any new findings that may affect your care.  Call 911  Call 911 if you have any of these:    Severe pain and very heavy bleeding    Severe lightheadedness, passing out, or fainting    Rapid heart rate    Trouble breathing    Confused or difficulty waking up  When to seek medical advice  Call your health care provider right away  if any of these occur:    The pain in your abdomen gets worse, either suddenly or gradually.    You are dizzy or weak when you stand.    You have heavy vaginal bleeding. This means soaking 1 pad an hour for 3 hours.    You have vaginal bleeding for more than 5 days.    You have repeated vomiting or diarrhea.    The pain in your abdomen moves to the lower right.    You have blood in your vomit or bowel movements. This will be dark red or black.    You have a fever of 100.4 F (38 C) or higher, or as directed by your health care provider       7932-7819 [email or web address] [address] [place] [address], [place] [address]. All rights reserved. This information is not intended as a substitute for professional medical care. Always follow your healthcare professional's instructions.

## 2022-08-03 ENCOUNTER — NURSE TRIAGE (OUTPATIENT)
Dept: FAMILY MEDICINE | Facility: OTHER | Age: 22
End: 2022-08-03

## 2022-08-03 ENCOUNTER — OFFICE VISIT (OUTPATIENT)
Dept: FAMILY MEDICINE | Facility: OTHER | Age: 22
End: 2022-08-03
Attending: NURSE PRACTITIONER
Payer: COMMERCIAL

## 2022-08-03 VITALS
OXYGEN SATURATION: 99 % | DIASTOLIC BLOOD PRESSURE: 62 MMHG | TEMPERATURE: 97.5 F | RESPIRATION RATE: 18 BRPM | BODY MASS INDEX: 28.31 KG/M2 | HEART RATE: 79 BPM | WEIGHT: 159.8 LBS | SYSTOLIC BLOOD PRESSURE: 107 MMHG

## 2022-08-03 DIAGNOSIS — F41.1 ANXIETY STATE: ICD-10-CM

## 2022-08-03 DIAGNOSIS — J01.00 SUBACUTE MAXILLARY SINUSITIS: Primary | ICD-10-CM

## 2022-08-03 DIAGNOSIS — F33.1 MAJOR DEPRESSIVE DISORDER, RECURRENT EPISODE, MODERATE (H): ICD-10-CM

## 2022-08-03 PROCEDURE — G0463 HOSPITAL OUTPT CLINIC VISIT: HCPCS

## 2022-08-03 PROCEDURE — 99214 OFFICE O/P EST MOD 30 MIN: CPT | Performed by: NURSE PRACTITIONER

## 2022-08-03 RX ORDER — AZITHROMYCIN 250 MG/1
TABLET, FILM COATED ORAL
Qty: 11 TABLET | Refills: 0 | Status: SHIPPED | OUTPATIENT
Start: 2022-08-03 | End: 2022-08-13

## 2022-08-03 ASSESSMENT — PATIENT HEALTH QUESTIONNAIRE - PHQ9
5. POOR APPETITE OR OVEREATING: NEARLY EVERY DAY
SUM OF ALL RESPONSES TO PHQ QUESTIONS 1-9: 24

## 2022-08-03 ASSESSMENT — ANXIETY QUESTIONNAIRES
7. FEELING AFRAID AS IF SOMETHING AWFUL MIGHT HAPPEN: NEARLY EVERY DAY
GAD7 TOTAL SCORE: 21
5. BEING SO RESTLESS THAT IT IS HARD TO SIT STILL: NEARLY EVERY DAY
2. NOT BEING ABLE TO STOP OR CONTROL WORRYING: NEARLY EVERY DAY
GAD7 TOTAL SCORE: 21
1. FEELING NERVOUS, ANXIOUS, OR ON EDGE: NEARLY EVERY DAY
3. WORRYING TOO MUCH ABOUT DIFFERENT THINGS: NEARLY EVERY DAY
6. BECOMING EASILY ANNOYED OR IRRITABLE: NEARLY EVERY DAY
IF YOU CHECKED OFF ANY PROBLEMS ON THIS QUESTIONNAIRE, HOW DIFFICULT HAVE THESE PROBLEMS MADE IT FOR YOU TO DO YOUR WORK, TAKE CARE OF THINGS AT HOME, OR GET ALONG WITH OTHER PEOPLE: EXTREMELY DIFFICULT

## 2022-08-03 ASSESSMENT — PAIN SCALES - GENERAL: PAINLEVEL: NO PAIN (0)

## 2022-08-03 NOTE — PROGRESS NOTES
Assessment & Plan        Subacute maxillary sinusitis  - azithromycin (ZITHROMAX) 250 MG tablet; Take 2 tablets (500 mg) by mouth daily for 1 day, THEN 1 tablet (250 mg) daily for 9 days.      Insure adequate fluid intake  Get plenty of rest  Monitor for temp at home, treat with OTC Tylenol or Ibuprofen per package instruction.  Humidity at home (add bacteriostatic solution to humidifier)  Please return in you do not improve  To UC or ER with persistent, worsening, or concerning symptoms        Anxiety state  - Please schedule an appointment with psychiatry  - Continue counseling    Major depressive disorder, recurrent episode, moderate (H)  - Please schedule an appointment with psychiatry  - Continue counseling      Nel Vazquez, MONTSE  [facility] - [place]FIE Weaver is a 22 year old presenting for the following health issues:  URI and Anxiety        Acute Illness  Acute illness concerns: throat pain  Onset/Duration: 15 months  Symptoms:  Fever: No  Chills/Sweats: No  Headache (location?): No  Sinus Pressure: YES  Conjunctivitis:  No  Ear Pain: YES: bilateral  Rhinorrhea: No  Congestion: YES  Sore Throat: YES  Cough: no  Wheeze: No  Decreased Appetite: No  Nausea: No  Vomiting: No  Diarrhea: No  Dysuria/Freq.: No  Dysuria or Hematuria: No  Fatigue/Achiness: No  Sick/Strep Exposure: No  Therapies tried and outcome: None        Anxiety Follow-Up    How are you doing with your anxiety since your last visit? Worsened     Are you having other symptoms that might be associated with anxiety? No    Have you had a significant life event? No     Are you feeling depressed? yes    Do you have any concerns with your use of alcohol or other drugs? No         Social History     Tobacco Use     Smoking status: Never Smoker     Smokeless tobacco: Never Used   Substance Use Topics     Alcohol use: No     Drug use: No       RUFINA-7 SCORE 4/16/2021 8/18/2021 8/3/2022   Total Score 21 21 21       PHQ 4/16/2021 8/18/2021  8/3/2022   PHQ-9 Total Score 21 21 24   Q9: Thoughts of better off dead/self-harm past 2 weeks Not at all Several days More than half the days   F/U: Thoughts of suicide or self-harm - - -   F/U: Self harm-plan - - -   F/U: Self-harm action - - -   F/U: Safety concerns - - -         Patient Active Problem List   Diagnosis     Tinnitus     Major depressive disorder, recurrent episode, moderate (H)     H/O methicillin resistant Staphylococcus aureus infection     Anxiety state     Bipolar disorder, unspecified (H)     Mild intellectual disabilities     Menorrhagia with irregular cycle     Encounter for insertion of intrauterine contraceptive device (IUD)     Past Surgical History:   Procedure Laterality Date     HEAD & NECK SURGERY      laser on face     LAPAROSCOPIC CYSTECTOMY OVARIAN (ONCOLOGY) Left 8/22/2019    Procedure: LAPAROSCOPY TREAT LEFT OVARIAN CYST, MULTIPLE ASPIRATIONS LEFT OVARIAN CYST;  Surgeon: Frow, David Franke, MD;  Location: [facility] surgery birthmark to left side of face         Social History     Tobacco Use     Smoking status: Never Smoker     Smokeless tobacco: Never Used   Substance Use Topics     Alcohol use: No     Family History   Problem Relation Age of Onset     Bipolar Disorder Mother      Depression Mother      Heart Disease Father      Diabetes Other      Myocardial Infarction Paternal Grandfather      Lung Cancer Paternal Grandfather      Other - See Comments Paternal Aunt         mulitiple sclerosis     Breast Cancer Maternal Grandfather         x 2           Current Outpatient Medications   Medication Sig Dispense Refill     albuterol (PROAIR HFA/PROVENTIL HFA/VENTOLIN HFA) 108 (90 Base) MCG/ACT inhaler Inhale 2 puffs into the lungs every 6 hours as needed for shortness of breath / dyspnea or wheezing 18 g 3     budesonide-formoterol (SYMBICORT) 80-4.5 MCG/ACT Inhaler Inhale 2 puffs into the lungs 2 times daily 10.2 g 3     diclofenac (VOLTAREN) 1 % topical gel Apply 4 g  topically       LORazepam (ATIVAN) 0.5 MG tablet TAKE 1 TABLET BY MOUTH ONCE DAILY AS NEEDED FOR ANXIETY       venlafaxine (EFFEXOR-XR) 37.5 MG 24 hr capsule TAKE 1 CAPSULE BY MOUTH AT BEDTIME         Allergies   Allergen Reactions     Cymbalta      Lexapro [Escitalopram] Other (See Comments)     hypersensitivity to touch        Recent Labs   Lab Test 05/11/21  0000 03/24/21  0000 01/29/21  0000 01/19/21  1428 12/25/20  0000 08/21/20  0000 06/03/20  1047 05/08/18  1027 06/22/17  2357   A1C  --   --   --  5.1  --   --   --   --   --    ALT 15  --  15  --  14   < > 24   < >  --    CR 0.81 0.86 0.77  --  0.81   < > 0.68   < > 0.41*   GFRESTIMATED >60 >60 >60  --  >60   < > >90   < > >90  Non  GFR Calc     GFRESTBLACK  --   --   --   --   --   --  >90  --  >90  African American GFR Calc     POTASSIUM 3.6 3.4 3.0*  --  3.6   < > 3.6   < > 3.8   TSH  --   --  4.17* 1.46  --   --   --    < >  --     < > = values in this interval not displayed.          BP Readings from Last 3 Encounters:   08/03/22 107/62   10/08/21 112/74   10/01/21 122/58    Wt Readings from Last 3 Encounters:   08/03/22 72.5 kg (159 lb 12.8 oz)   10/08/21 76.7 kg (169 lb)   10/01/21 78.5 kg (173 lb)                Review of Systems   Constitutional, HEENT, cardiovascular, pulmonary, gi and gu systems are negative, except as otherwise noted.          Objective    /62 (BP Location: Left arm, Patient Position: Sitting, Cuff Size: Adult Regular)   Pulse 79   Temp 97.5  F (36.4  C) (Tympanic)   Resp 18   Wt 72.5 kg (159 lb 12.8 oz)   SpO2 99%   BMI 28.31 kg/m    Body mass index is 28.31 kg/m .       Physical Exam   GENERAL: healthy, alert and no distress  EYES: Eyes grossly normal to inspection, PERRL and conjunctivae and sclerae normal  HENT: sinuses: maxillary, frontal tenderness on both sides, PND - yellow  NECK: no adenopathy, no asymmetry, masses, or scars and thyroid normal to palpation  RESP: lungs clear to auscultation -  no rales, rhonchi or wheezes  CV: regular rate and rhythm, normal S1 S2, no S3 or S4, no murmur, click or rub, no peripheral edema and peripheral pulses strong  SKIN: no suspicious lesions or rashes  PSYCH: anxious            .  ..

## 2022-08-03 NOTE — NURSING NOTE
"Chief Complaint   Patient presents with     URI     Anxiety       Initial /62 (BP Location: Left arm, Patient Position: Sitting, Cuff Size: Adult Regular)   Pulse 79   Temp 97.5  F (36.4  C) (Tympanic)   Resp 18   Wt 72.5 kg (159 lb 12.8 oz)   SpO2 99%   BMI 28.31 kg/m   Estimated body mass index is 28.31 kg/m  as calculated from the following:    Height as of 10/1/21: 1.6 m (5' 3\").    Weight as of this encounter: 72.5 kg (159 lb 12.8 oz).  Medication Reconciliation: complete  Balbina Nielsen LPN  "

## 2022-08-03 NOTE — COMMUNITY RESOURCES LIST (ENGLISH)
08/03/2022   [facility] - Outpatient Clinics  N/A  For questions about this resource list or additional care needs, please contact your primary care clinic or care manager.  Phone: [phone]   Email: N/A   Address: [address] [place] [id number]   Hours: N/A        Mental Health       Individual counseling  1  [facility] Distance: 1.46 miles      COVID-19 Status: Phone/Virtual   [address] [place] [address]  Language: English  Hours: Mon - Thu 8:00 AM - 5:00 PM  Fees: Insurance, Self Pay   Phone: [phone] Website: [email or web address]     2  [facility] Distance[facility] 2 miles      COVID-19 Status: Phone/Virtual   [address] [place] [address]  Language: English  Hours: Mon - Fri 8:00 AM - 5:00 PM  Fees: Insurance, Self Pay   Phone: [phone] Website: [email or web address]          Important Numbers & Websites       Emergency Services   911  [facility]   311  Poison Control   (736) 645-2952  Suicide Prevention Lifeline   (357) 544-4565 (TALK)  Child Abuse Hotline   (760) 513-5883 (4-A-Child)  Sexual Assault Hotline   (887) 133-1692 (HOPE)  National Runaway Safeline   (819) 894-5077 (RUNAWAY)  All-Options Talkline   (898) 622-2505  Substance Abuse Referral   (243) 481-9226 (HELP)

## 2022-08-03 NOTE — PATIENT INSTRUCTIONS
Assessment & Plan        Subacute maxillary sinusitis  - azithromycin (ZITHROMAX) 250 MG tablet; Take 2 tablets (500 mg) by mouth daily for 1 day, THEN 1 tablet (250 mg) daily for 9 days.      Insure adequate fluid intake  Get plenty of rest  Monitor for temp at home, treat with OTC Tylenol or Ibuprofen per package instruction.  Humidity at home (add bacteriostatic solution to humidifier)  Please return in you do not improve  To UC or ER with persistent, worsening, or concerning symptoms        Anxiety state  - Please schedule an appointment with psychiatry  - Continue counseling    Major depressive disorder, recurrent episode, moderate (H)  - Please schedule an appointment with psychiatry  - Continue counseling      Nel Vazquez, MONTSE  [facility] - [place] [facility]

## 2022-08-03 NOTE — TELEPHONE ENCOUNTER
"    Reason for Disposition    [1] Large node AND [2] present > 2 weeks    Symptoms of anxiety or panic and has not been evaluated for this by physician    Answer Assessment - Initial Assessment Questions  1. LOCATION: \"Where is the swollen node located?\" \"Is the matching node on the other side of the body also swollen?\"       under her chin  2. SIZE: \"How big is the node?\" (Inches or centimeters) (or compare to common objects such as pea, bean, marble, golf ball)       Angela size  3. ONSET: \"When did the swelling start?\"       One month ago  4. NECK NODES: \"Is there a sore throat, runny nose or other symptoms of a cold?\"       Congestion sore throat ears hurt  5. GROIN OR ARMPIT NODES: \"Is there a sore, scratch, cut or painful red area on that arm or leg?\"       no  6. FEVER: \"Do you have a fever?\" If so, ask: \"What is it, how was it measured, and when did it start?\"       no  7. CAUSE: \"What do you think is causing the swollen lymph nodes?\"      unknown  8. OTHER SYMPTOMS: \"Do you have any other symptoms?\"      no  9. PREGNANCY: \"Is there any chance you are pregnant?\" \"When was your last menstrual period?\"      no    Answer Assessment - Initial Assessment Questions  1. CONCERN: \"What happened that made you call today?\"      Patient started a new job and is feeling increased anxiety  2. ANXIETY SYMPTOM SCREENING: \"Can you describe how you have been feeling?\"  (e.g., tense, restless, panicky, anxious, keyed up, trouble sleeping, trouble concentrating)      Has been having panic attacks,  She thinks she is working too long of hours at [employer]  3. ONSET: \"How long have you been feeling this way?\"      May  4. RECURRENT: \"Have you felt this way before?\"  If yes: \"What happened that time?\" \"What helped these feelings go away in the past?\"       Yes she has had panic attacks before and she also is feeling paranoid  5. RISK OF HARM - SUICIDAL IDEATION:  \"Do you ever have thoughts of hurting or killing yourself?\"  " "(e.g., yes, no, no but preoccupation with thoughts about death)    - INTENT:  \"Do you have thoughts of hurting or killing yourself right NOW?\" (e.g., yes, no, N/A)    - PLAN: \"Do you have a specific plan for how you would do this?\" (e.g., gun, knife, overdose, no plan, N/A)      no  6. RISK OF HARM - HOMICIDAL IDEATION:  \"Do you ever have thoughts of hurting or killing someone else?\"  (e.g., yes, no, no but preoccupation with thoughts about death)    - INTENT:  \"Do you have thoughts of hurting or killing someone right NOW?\" (e.g., yes, no, N/A)    - PLAN: \"Do you have a specific plan for how you would do this?\" (e.g., gun, knife, no plan, N/A)       no  7. FUNCTIONAL IMPAIRMENT: \"How have things been going for you overall in your life? Have you had any more difficulties than usual doing your normal daily activities?\"  (e.g., better, same, worse; self-care, school, work, interactions)      Patient is able to do normal daily activities  8. SUPPORT: \"Who is with you now?\" \"Who do you live with?\" \"Do you have family or friends nearby who you can talk to?\"       Has one person that she can talk to and also see's a psychiatrist but keeps missing appointment.  Advised it might help to make an appointment with the psychiatrist in the near future  9. THERAPIST: \"Do you have a counselor or therapist? Name?\"      Yes [facility]  10. STRESSORS: \"Has there been any new stress or recent changes in your life?\"        New job  11. CAFFEINE ABUSE: \"Do you drink caffeinated beverages, and how much each day?\" (e.g., coffee, tea, karlie)        Now and then  12. SUBSTANCE ABUSE: \"Do you use any illegal drugs or alcohol?\"        no  13. OTHER SYMPTOMS: \"Do you have any other physical symptoms right now?\" (e.g., chest pain, palpitations, difficulty breathing, fever)        no  14. PREGNANCY: \"Is there any chance you are pregnant?\" \"When was your last menstrual period?\"        no    Protocols used: LYMPH NODES " ITDDCPR-H-VC, ANXIETY AND PANIC ATTACK-A-OH

## 2022-08-16 NOTE — PROGRESS NOTES
"  Assessment & Plan     1. Anxiety state  Restart zoloft  Please contact psychiatry for follow-up visit. She does have contact numbers and her [employer] worker will assist her.    - sertraline (ZOLOFT) 50 MG tablet; Take 1 tablet (50 mg) by mouth daily  Dispense: 30 tablet; Refill: 1    2. Major depressive disorder, recurrent episode, moderate (H)  - sertraline (ZOLOFT) 50 MG tablet; Take 1 tablet (50 mg) by mouth daily  Dispense: 30 tablet; Refill: 1    3. Weakness of both hands  - Adult Neurology  Referral    4. Numbness and tingling in both hands  - Adult Neurology  Referral    5. Wheezing  - albuterol (PROAIR HFA/PROVENTIL HFA/VENTOLIN HFA) 108 (90 Base) MCG/ACT inhaler; Inhale 2 puffs into the lungs every 6 hours as needed for shortness of breath / dyspnea or wheezing  Dispense: 18 g; Refill: 3       BMI:   Estimated body mass index is 28.79 kg/m  as calculated from the following:    Height as of this encounter: 1.6 m (5' 3\").    Weight as of this encounter: 73.7 kg (162 lb 8 oz).       Follow-up in 2 weeks or as needed     Tiara Hidalgo NP  [facility]   Meg is a 22 year old, presenting for the following health issues:  Depression and Anxiety      HPI     Depression and Anxiety Follow-Up    How are you doing with your depression since your last visit? Worsened     How are you doing with your anxiety since your last visit?  Worsened     Are you having other symptoms that might be associated with depression or anxiety? Yes:  panic attacks bouts of crying     Have you had a significant life event? OTHER: started new job at [employer]  Where they are having her work 56-60 hours per week.  This is drastically increasing her anxiety, panic attacks and worsening depression.  She feels she just cannot keep it up.  Requesting letter to work 8 hour shifts 5 days per week.  She is in contact with her mental [facility] providers and will be starting " counseling soon.      Do you have any concerns with your use of alcohol or other drugs? No     She has not been using effexor, would like to restart zoloft as felt this treated anxiety better.      Social History     Tobacco Use     Smoking status: Never Smoker     Smokeless tobacco: Never Used   Substance Use Topics     Alcohol use: No     Drug use: No     PHQ 8/18/2021 8/3/2022 8/18/2022   PHQ-9 Total Score 21 24 27   Q9: Thoughts of better off dead/self-harm past 2 weeks Several days More than half the days Nearly every day   F/U: Thoughts of suicide or self-harm - - -   F/U: Self harm-plan - - -   F/U: Self-harm action - - -   F/U: Safety concerns - - -     RUFINA-7 SCORE 8/18/2021 8/3/2022 8/18/2022   Total Score 21 21 21     Last PHQ-9 8/18/2022   1.  Little interest or pleasure in doing things 3   2.  Feeling down, depressed, or hopeless 3   3.  Trouble falling or staying asleep, or sleeping too much 3   4.  Feeling tired or having little energy 3   5.  Poor appetite or overeating 3   6.  Feeling bad about yourself 3   7.  Trouble concentrating 3   8.  Moving slowly or restless 3   Q9: Thoughts of better off dead/self-harm past 2 weeks 3   PHQ-9 Total Score 27   Difficulty at work, home, or with people Extremely dIfficult   In the past two weeks have you had thoughts of suicide or self harm? -   Do you have concerns about your personal safety or the safety of others? -   In the past 2 weeks have you thought about a plan or had intention to harm yourself? -   In the past 2 weeks have you acted on these thoughts in any way? -     RUFINA-7  8/18/2022   1. Feeling nervous, anxious, or on edge 3   2. Not being able to stop or control worrying 3   3. Worrying too much about different things 3   4. Trouble relaxing 3   5. Being so restless that it is hard to sit still 3   6. Becoming easily annoyed or irritable 3   7. Feeling afraid, as if something awful might happen 3   RUFINA-7 Total Score 21   If you checked any  "problems, how difficult have they made it for you to do your work, take care of things at home, or get along with other people? Extremely difficult       Suicide Assessment Five-step Evaluation and Treatment (SAFE-T)    BILATERAL HAND PAIN WITH NUMBNESS AND WEAKNESS:  Ongoing for several months but worsening.  She has been dropping pens, cups and other items when carrying them.  She has been wearing bilateral wrist braces with minimal relief.  She has not had an EMG.         Recent Labs   Lab Test 05/11/21  0000 03/24/21  0000 01/29/21  0000 01/19/21  1428 12/25/20  0000 08/21/20  0000 06/03/20  1047 05/08/18  1027 06/22/17  2357   A1C  --   --   --  5.1  --   --   --   --   --    ALT 15  --  15  --  14   < > 24   < >  --    CR 0.81 0.86 0.77  --  0.81   < > 0.68   < > 0.41*   GFRESTIMATED >60 >60 >60  --  >60   < > >90   < > >90  Non  GFR Calc     GFRESTBLACK  --   --   --   --   --   --  >90  --  >90  African American GFR Calc     POTASSIUM 3.6 3.4 3.0*  --  3.6   < > 3.6   < > 3.8   TSH  --   --  4.17* 1.46  --   --   --    < >  --     < > = values in this interval not displayed.      BP Readings from Last 3 Encounters:   08/18/22 128/66   08/03/22 107/62   10/08/21 112/74    Wt Readings from Last 3 Encounters:   08/18/22 73.7 kg (162 lb 8 oz)   08/03/22 72.5 kg (159 lb 12.8 oz)   10/08/21 76.7 kg (169 lb)                      Review of Systems   Constitutional, HEENT, cardiovascular, pulmonary, gi and gu systems are negative, except as otherwise noted.      Objective    /66 (BP Location: Left arm, Patient Position: Chair, Cuff Size: Adult Regular)   Pulse 90   Temp 99.1  F (37.3  C) (Tympanic)   Resp 16   Ht 1.6 m (5' 3\")   Wt 73.7 kg (162 lb 8 oz)   LMP 07/20/2022 (Approximate)   SpO2 99%   BMI 28.79 kg/m    Body mass index is 28.79 kg/m .  Physical Exam   GENERAL: healthy, alert and no distress  MS: positive phalen and tinnels test.   PSYCH: mentation appears normal, affect " normal/bright                    .  ..

## 2022-08-18 ENCOUNTER — OFFICE VISIT (OUTPATIENT)
Dept: FAMILY MEDICINE | Facility: OTHER | Age: 22
End: 2022-08-18
Attending: NURSE PRACTITIONER
Payer: COMMERCIAL

## 2022-08-18 VITALS
RESPIRATION RATE: 16 BRPM | SYSTOLIC BLOOD PRESSURE: 128 MMHG | OXYGEN SATURATION: 99 % | DIASTOLIC BLOOD PRESSURE: 66 MMHG | HEART RATE: 90 BPM | HEIGHT: 63 IN | TEMPERATURE: 99.1 F | BODY MASS INDEX: 28.79 KG/M2 | WEIGHT: 162.5 LBS

## 2022-08-18 DIAGNOSIS — F33.1 MAJOR DEPRESSIVE DISORDER, RECURRENT EPISODE, MODERATE (H): ICD-10-CM

## 2022-08-18 DIAGNOSIS — R29.898 WEAKNESS OF BOTH HANDS: ICD-10-CM

## 2022-08-18 DIAGNOSIS — R06.2 WHEEZING: ICD-10-CM

## 2022-08-18 DIAGNOSIS — R20.0 NUMBNESS AND TINGLING IN BOTH HANDS: ICD-10-CM

## 2022-08-18 DIAGNOSIS — R20.2 NUMBNESS AND TINGLING IN BOTH HANDS: ICD-10-CM

## 2022-08-18 DIAGNOSIS — F41.1 ANXIETY STATE: Primary | ICD-10-CM

## 2022-08-18 PROCEDURE — G0463 HOSPITAL OUTPT CLINIC VISIT: HCPCS

## 2022-08-18 PROCEDURE — 99214 OFFICE O/P EST MOD 30 MIN: CPT | Performed by: NURSE PRACTITIONER

## 2022-08-18 RX ORDER — ALBUTEROL SULFATE 90 UG/1
2 AEROSOL, METERED RESPIRATORY (INHALATION) EVERY 6 HOURS PRN
Qty: 18 G | Refills: 3 | Status: SHIPPED | OUTPATIENT
Start: 2022-08-18 | End: 2023-07-13

## 2022-08-18 ASSESSMENT — ANXIETY QUESTIONNAIRES
4. TROUBLE RELAXING: NEARLY EVERY DAY
IF YOU CHECKED OFF ANY PROBLEMS ON THIS QUESTIONNAIRE, HOW DIFFICULT HAVE THESE PROBLEMS MADE IT FOR YOU TO DO YOUR WORK, TAKE CARE OF THINGS AT HOME, OR GET ALONG WITH OTHER PEOPLE: EXTREMELY DIFFICULT
6. BECOMING EASILY ANNOYED OR IRRITABLE: NEARLY EVERY DAY
GAD7 TOTAL SCORE: 21
3. WORRYING TOO MUCH ABOUT DIFFERENT THINGS: NEARLY EVERY DAY
1. FEELING NERVOUS, ANXIOUS, OR ON EDGE: NEARLY EVERY DAY
5. BEING SO RESTLESS THAT IT IS HARD TO SIT STILL: NEARLY EVERY DAY
7. FEELING AFRAID AS IF SOMETHING AWFUL MIGHT HAPPEN: NEARLY EVERY DAY
GAD7 TOTAL SCORE: 21
2. NOT BEING ABLE TO STOP OR CONTROL WORRYING: NEARLY EVERY DAY

## 2022-08-18 ASSESSMENT — PATIENT HEALTH QUESTIONNAIRE - PHQ9: SUM OF ALL RESPONSES TO PHQ QUESTIONS 1-9: 27

## 2022-08-18 ASSESSMENT — PAIN SCALES - GENERAL: PAINLEVEL: MODERATE PAIN (5)

## 2022-08-18 NOTE — PATIENT INSTRUCTIONS
"  Assessment & Plan     1. Anxiety state  Restart zoloft  Please contact psychiatry for follow-up visit.   - sertraline (ZOLOFT) 50 MG tablet; Take 1 tablet (50 mg) by mouth daily  Dispense: 30 tablet; Refill: 1  - work note provided     2. Major depressive disorder, recurrent episode, moderate (H)  - sertraline (ZOLOFT) 50 MG tablet; Take 1 tablet (50 mg) by mouth daily  Dispense: 30 tablet; Refill: 1    3. Weakness of both hands  - Adult Neurology  Referral    4. Numbness and tingling in both hands  - Adult Neurology  Referral    5. Wheezing  - albuterol (PROAIR HFA/PROVENTIL HFA/VENTOLIN HFA) 108 (90 Base) MCG/ACT inhaler; Inhale 2 puffs into the lungs every 6 hours as needed for shortness of breath / dyspnea or wheezing  Dispense: 18 g; Refill: 3       BMI:   Estimated body mass index is 28.79 kg/m  as calculated from the following:    Height as of this encounter: 1.6 m (5' 3\").    Weight as of this encounter: 73.7 kg (162 lb 8 oz).       Follow-up in 2 weeks or as needed     Tiara Hidalgo NP  [facility]    "

## 2022-08-18 NOTE — LETTER
[facility]  [address]  [facility] [address]  Phone: [phone]    August 18, 2022        Meg Diallo  [address] [facility]  [address] [place] [id number]          To whom it may concern:    RE: Meg Diallo    Patient was seen and treated today at our clinic.  Please limit work days to 8 hour shifts 5 days per week due to medical condition.     Please contact me for questions or concerns.      Sincerely,        Tiara Hidalgo NP

## 2022-08-18 NOTE — NURSING NOTE
"Chief Complaint   Patient presents with     Depression     Anxiety       Initial /66 (BP Location: Left arm, Patient Position: Chair, Cuff Size: Adult Regular)   Pulse 90   Temp 99.1  F (37.3  C) (Tympanic)   Resp 16   Ht 1.6 m (5' 3\")   Wt 73.7 kg (162 lb 8 oz)   LMP 07/20/2022 (Approximate)   SpO2 99%   BMI 28.79 kg/m   Estimated body mass index is 28.79 kg/m  as calculated from the following:    Height as of this encounter: 1.6 m (5' 3\").    Weight as of this encounter: 73.7 kg (162 lb 8 oz).  Medication Reconciliation: complete  Pamela M. Lechevalier, LPN    "

## 2022-08-29 ENCOUNTER — TELEPHONE (OUTPATIENT)
Dept: FAMILY MEDICINE | Facility: OTHER | Age: 22
End: 2022-08-29

## 2022-08-29 NOTE — TELEPHONE ENCOUNTER
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 Please call patient for further discussion.     Was an appointment offered for this call? No  If yes : Appointment type              Date    Preferred method for responding to this message: Telephone Call  What is your phone number ? [phone]    If we cannot reach you directly, may we leave a detailed response at the number you provided? Yes    Can this message wait until your PCP/provider returns, if available today? Not applicable, provider is in today     Anai Haney

## 2022-09-01 ENCOUNTER — TELEPHONE (OUTPATIENT)
Dept: FAMILY MEDICINE | Facility: OTHER | Age: 22
End: 2022-09-01

## 2022-09-01 NOTE — TELEPHONE ENCOUNTER
[facility] ,they spoke with pt on 8/31/22 pt has had multiple No Shows in outreach, pt can only been seen with a refundable deposit and coming to [place] for an appointment. Pt Declined. Message sen to provider to place new referral for a new facility.

## 2022-09-02 ENCOUNTER — NURSE TRIAGE (OUTPATIENT)
Dept: FAMILY MEDICINE | Facility: OTHER | Age: 22
End: 2022-09-02

## 2022-09-02 NOTE — TELEPHONE ENCOUNTER
"Pt calling and wants appt for lower left abdominal pain that started yesterday. 6/10,BM yesterday. She has history of ovarian cyst and constipation.Per care advice needs to be seen in 4 hour or UC.Advised pt to go to UC.She verbalized understanding.    Jessica Encarnacion RN        Reason for Disposition    [1] MILD-MODERATE pain AND [2] constant AND [3] present > 2 hours    Additional Information    Negative: Shock suspected (e.g., cold/pale/clammy skin, too weak to stand, low BP, rapid pulse)    Negative: Difficult to awaken or acting confused (e.g., disoriented, slurred speech)    Negative: Passed out (i.e., lost consciousness, collapsed and was not responding)    Negative: Sounds like a life-threatening emergency to the triager    Negative: Chest pain    Negative: Pain is mainly in upper abdomen  (if needed ask: \"is it mainly above the belly button?\")    Negative: Followed an abdomen (stomach) injury    Negative: [1] Abdominal pain AND [2] pregnant < 20 weeks    Negative: [1] Abdominal pain AND [2] pregnant 20 or more weeks    Negative: [1] Abdominal pain AND [2] postpartum (from 0 to 6 weeks after delivery)    Negative: [1] SEVERE pain (e.g., excruciating) AND [2] present > 1 hour    Negative: [1] SEVERE pain AND [2] age > 60 years    Negative: [1] Vomiting AND [2] contains red blood or black (\"coffee ground\") material  (Exception: few red streaks in vomit that only happened once)    Negative: Blood in bowel movements (Exception: blood on surface of BM with constipation)    Negative: Black or tarry bowel movements (Exception: chronic-unchanged black-grey bowel movements AND is taking iron pills or Pepto-bismol)    Negative: Patient sounds very sick or weak to the triager    Answer Assessment - Initial Assessment Questions  1. LOCATION: \"Where does it hurt?\"       Left lower abdomen  2. RADIATION: \"Does the pain shoot anywhere else?\" (e.g., chest, back)      no  3. ONSET: \"When did the pain begin?\" (e.g., minutes, " "hours or days ago)       yesterday  4. SUDDEN: \"Gradual or sudden onset?\"      gradual  5. PATTERN \"Does the pain come and go, or is it constant?\"     - If constant: \"Is it getting better, staying the same, or worsening?\"       (Note: Constant means the pain never goes away completely; most serious pain is constant and it progresses)      - If intermittent: \"How long does it last?\" \"Do you have pain now?\"      (Note: Intermittent means the pain goes away completely between bouts)      constant  6. SEVERITY: \"How bad is the pain?\"  (e.g., Scale 1-10; mild, moderate, or severe)    - MILD (1-3): doesn't interfere with normal activities, abdomen soft and not tender to touch     - MODERATE (4-7): interferes with normal activities or awakens from sleep, abdomen tender to touch     - SEVERE (8-10): excruciating pain, doubled over, unable to do any normal activities       6/10  7. RECURRENT SYMPTOM: \"Have you ever had this type of stomach pain before?\" If Yes, ask: \"When was the last time?\" and \"What happened that time?\"       Had a lot of cysts and constipation,last BM was this AM  8. CAUSE: \"What do you think is causing the stomach pain?\"      See above  9. RELIEVING/AGGRAVATING FACTORS: \"What makes it better or worse?\" (e.g., movement, antacids, bowel movement)      no  10. OTHER SYMPTOMS: \"Do you have any other symptoms?\" (e.g., back pain, diarrhea, fever, urination pain, vomiting)        no  11. PREGNANCY: \"Is there any chance you are pregnant?\" \"When was your last menstrual period?\"        no    Protocols used: ABDOMINAL PAIN - FEMALE-A-AH      "

## 2022-09-14 ENCOUNTER — NURSE TRIAGE (OUTPATIENT)
Dept: FAMILY MEDICINE | Facility: OTHER | Age: 22
End: 2022-09-14

## 2022-09-14 NOTE — PROGRESS NOTES
"  Assessment & Plan     1. Breast tenderness in female  Normal breast exam.    - HCG Qual, Urine ([id number])    2. Anxiety state  Stable continue zoloft     3. Major depressive disorder, recurrent episode, moderate (H)  As above    4. Need for prophylactic vaccination and inoculation against influenza  - INFLUENZA VACCINE IM > 6 MONTHS VALENT IIV4 (AFLURIA/FLUZONE)    5. Encounter for surveillance of injectable contraceptive  Depo updated today.          BMI:   Estimated body mass index is 27.32 kg/m  as calculated from the following:    Height as of this encounter: 1.6 m (5' 3\").    Weight as of this encounter: 69.9 kg (154 lb 3.2 oz).       Follow-up in 3 months or as needed     Tiara Hidalgo NP  [facility] REHAN Weaver is a 22 year old, presenting for the following health issues:  Breast Pain and Anxiety      HPI     Depression and Anxiety Follow-Up    How are you doing with your depression since your last visit? No change    How are you doing with your anxiety since your last visit?  No change    Are you having other symptoms that might be associated with depression or anxiety? Yes:  panic attacks and bouts of crying     Have you had a significant life event? No     Do you have any concerns with your use of alcohol or other drugs? No     Is scheduled with a therapist.     Social History     Tobacco Use     Smoking status: Never Smoker     Smokeless tobacco: Never Used   Substance Use Topics     Alcohol use: No     Drug use: No     PHQ 8/3/2022 8/18/2022 9/15/2022   PHQ-9 Total Score 24 27 25   Q9: Thoughts of better off dead/self-harm past 2 weeks More than half the days Nearly every day Several days   F/U: Thoughts of suicide or self-harm - - -   F/U: Self harm-plan - - -   F/U: Self-harm action - - -   F/U: Safety concerns - - -     RUFINA-7 SCORE 8/3/2022 8/18/2022 9/15/2022   Total Score 21 21 21     Last PHQ-9 9/15/2022   1.  Little interest or pleasure in doing " things 3   2.  Feeling down, depressed, or hopeless 3   3.  Trouble falling or staying asleep, or sleeping too much 3   4.  Feeling tired or having little energy 3   5.  Poor appetite or overeating 3   6.  Feeling bad about yourself 3   7.  Trouble concentrating 3   8.  Moving slowly or restless 3   Q9: Thoughts of better off dead/self-harm past 2 weeks 1   PHQ-9 Total Score 25   Difficulty at work, home, or with people Extremely dIfficult   In the past two weeks have you had thoughts of suicide or self harm? -   Do you have concerns about your personal safety or the safety of others? -   In the past 2 weeks have you thought about a plan or had intention to harm yourself? -   In the past 2 weeks have you acted on these thoughts in any way? -     RUFINA-7  9/15/2022   1. Feeling nervous, anxious, or on edge 3   2. Not being able to stop or control worrying 3   3. Worrying too much about different things 3   4. Trouble relaxing 3   5. Being so restless that it is hard to sit still 3   6. Becoming easily annoyed or irritable 3   7. Feeling afraid, as if something awful might happen 3   RUFINA-7 Total Score 21   If you checked any problems, how difficult have they made it for you to do your work, take care of things at home, or get along with other people? Extremely difficult       Suicide Assessment Five-step Evaluation and Treatment (SAFE-T)    How many days per week do you miss taking your medication? 3    What makes it hard for you to take your medications?  remembering to take    Breast Concern  Onset/Duration: 2 days ago   Description:   Location: left side   Pain or tenderness: YES  Redness:  no   Intensity: moderate  Progression of Symptoms: same  Accompanying Signs & Symptoms:  Any lumps in axillary region: YES- left side   Movable: No  Nipple discharge: no   Changes in the skin or nipple: none  On Hormone therapy: No  Does it change with menstrual cycle:  no   Previous history of similar problem: no  First degree  "relative with breast cancer: a positive family history for breast cancer in her paternal grandmother.  Precipitating factors:           Worsened by: wearing a bra   Alleviating factors:            Improved by: nothing   Therapies tried and outcome: None  Patient's last menstrual period was 08/20/2022 (approximate).  Denies breast pain with previous periods.         Review of Systems   Constitutional, HEENT, cardiovascular, pulmonary, gi and gu systems are negative, except as otherwise noted.      Objective    /62 (BP Location: Right arm, Patient Position: Chair, Cuff Size: Adult Regular)   Pulse 87   Temp 98.8  F (37.1  C) (Tympanic)   Resp 14   Ht 1.6 m (5' 3\")   Wt 69.9 kg (154 lb 3.2 oz)   LMP 08/20/2022 (Approximate)   SpO2 100%   Breastfeeding No   BMI 27.32 kg/m    Body mass index is 27.32 kg/m .  Physical Exam   GENERAL: healthy, alert and no distress  BREAST: normal without masses, tenderness or nipple discharge and no palpable axillary masses or adenopathy  MS: no gross musculoskeletal defects noted, no edema  PSYCH: mentation appears normal, affect normal/bright        Results for orders placed or performed in visit on 09/15/22   HCG Qual, Urine ([id number])     Status: Normal   Result Value Ref Range    hCG Urine Qualitative Negative Negative                 "

## 2022-09-14 NOTE — TELEPHONE ENCOUNTER
"Pt calling with left breast pain and found a lump near nipple. No discharge, no redness.No fever. Pt scheduled for tomorrow.    Jessica Encarnacion RN      Reason for Disposition    Breast lump    Additional Information    Negative: Chest pain    Negative: Breastfeeding questions about baby    Negative: Breastfeeding questions about mother (breast symptoms or feeling sick)    Negative: Breastfeeding questions about mother's medicines and diet    Negative: Postpartum breast pain and swelling, not breastfeeding    Negative: Small spot, skin growth or mole    Negative: [1] SEVERE breast pain AND [2] fever > 103 F (39.4 C)    Negative: Patient sounds very sick or weak to the triager    Negative: [1] Breast looks infected (spreading redness, feels hot or painful to touch) AND [2] fever    Negative: [1] Breast looks infected (spreading redness, feels hot or painful to touch) AND [2] no fever    Negative: [1] Painful rash AND [2] multiple small blisters grouped together (i.e., dermatomal distribution or \"band\" or \"stripe\")    Negative: [1] Cuts, burns, or bruises of breasts AND [2] suspicious history for the injury    Answer Assessment - Initial Assessment Questions  1. SYMPTOM: \"What's the main symptom you're concerned about?\"  (e.g., lump, pain, rash, nipple discharge)      Left breast pain and lump near nipple size bigger than a pea  2. LOCATION: \"Where is the  located?\"        3. ONSET: \"When did   start?\"      Found lump yesterday, breast pain started not sure gradually  4. PRIOR HISTORY: \"Do you have any history of prior problems with your breasts?\" (e.g., lumps, cancer, fibrocystic breast disease)      No, family does  5. CAUSE: \"What do you think is causing this symptom?\"      not  6. OTHER SYMPTOMS: \"Do you have any other symptoms?\" (e.g., fever, breast pain, redness or rash, nipple discharge)      Breast pain  7. PREGNANCY-BREASTFEEDING: \"Is there any chance you are pregnant?\" \"When was your last menstrual period?\" \"Are " "you breastfeeding?\"      No, around 8.20.2022    Protocols used: BREAST SYMPTOMS-A-AH      "

## 2022-09-15 ENCOUNTER — OFFICE VISIT (OUTPATIENT)
Dept: FAMILY MEDICINE | Facility: OTHER | Age: 22
End: 2022-09-15
Attending: NURSE PRACTITIONER
Payer: COMMERCIAL

## 2022-09-15 VITALS
OXYGEN SATURATION: 100 % | RESPIRATION RATE: 14 BRPM | SYSTOLIC BLOOD PRESSURE: 122 MMHG | WEIGHT: 154.2 LBS | HEIGHT: 63 IN | BODY MASS INDEX: 27.32 KG/M2 | HEART RATE: 87 BPM | TEMPERATURE: 98.8 F | DIASTOLIC BLOOD PRESSURE: 62 MMHG

## 2022-09-15 DIAGNOSIS — Z30.42 ENCOUNTER FOR SURVEILLANCE OF INJECTABLE CONTRACEPTIVE: ICD-10-CM

## 2022-09-15 DIAGNOSIS — F41.1 ANXIETY STATE: ICD-10-CM

## 2022-09-15 DIAGNOSIS — N64.4 BREAST TENDERNESS IN FEMALE: Primary | ICD-10-CM

## 2022-09-15 DIAGNOSIS — F33.1 MAJOR DEPRESSIVE DISORDER, RECURRENT EPISODE, MODERATE (H): ICD-10-CM

## 2022-09-15 DIAGNOSIS — Z23 NEED FOR PROPHYLACTIC VACCINATION AND INOCULATION AGAINST INFLUENZA: ICD-10-CM

## 2022-09-15 LAB — HCG UR QL: NEGATIVE

## 2022-09-15 PROCEDURE — G0463 HOSPITAL OUTPT CLINIC VISIT: HCPCS

## 2022-09-15 PROCEDURE — 81025 URINE PREGNANCY TEST: CPT | Mod: ZL | Performed by: NURSE PRACTITIONER

## 2022-09-15 PROCEDURE — 99214 OFFICE O/P EST MOD 30 MIN: CPT | Performed by: NURSE PRACTITIONER

## 2022-09-15 PROCEDURE — 90686 IIV4 VACC NO PRSV 0.5 ML IM: CPT

## 2022-09-15 PROCEDURE — G0008 ADMIN INFLUENZA VIRUS VAC: HCPCS

## 2022-09-15 RX ORDER — BUPROPION HYDROCHLORIDE 100 MG/1
100 TABLET ORAL 2 TIMES DAILY
COMMUNITY
End: 2023-01-05

## 2022-09-15 ASSESSMENT — ANXIETY QUESTIONNAIRES
GAD7 TOTAL SCORE: 21
IF YOU CHECKED OFF ANY PROBLEMS ON THIS QUESTIONNAIRE, HOW DIFFICULT HAVE THESE PROBLEMS MADE IT FOR YOU TO DO YOUR WORK, TAKE CARE OF THINGS AT HOME, OR GET ALONG WITH OTHER PEOPLE: EXTREMELY DIFFICULT
4. TROUBLE RELAXING: NEARLY EVERY DAY
2. NOT BEING ABLE TO STOP OR CONTROL WORRYING: NEARLY EVERY DAY
6. BECOMING EASILY ANNOYED OR IRRITABLE: NEARLY EVERY DAY
5. BEING SO RESTLESS THAT IT IS HARD TO SIT STILL: NEARLY EVERY DAY
7. FEELING AFRAID AS IF SOMETHING AWFUL MIGHT HAPPEN: NEARLY EVERY DAY
1. FEELING NERVOUS, ANXIOUS, OR ON EDGE: NEARLY EVERY DAY
3. WORRYING TOO MUCH ABOUT DIFFERENT THINGS: NEARLY EVERY DAY
GAD7 TOTAL SCORE: 21

## 2022-09-15 ASSESSMENT — PAIN SCALES - GENERAL: PAINLEVEL: SEVERE PAIN (6)

## 2022-09-15 ASSESSMENT — PATIENT HEALTH QUESTIONNAIRE - PHQ9: SUM OF ALL RESPONSES TO PHQ QUESTIONS 1-9: 25

## 2022-09-15 NOTE — NURSING NOTE
"Chief Complaint   Patient presents with     Breast Pain     Anxiety       Initial /62 (BP Location: Right arm, Patient Position: Chair, Cuff Size: Adult Regular)   Pulse 87   Temp 98.8  F (37.1  C) (Tympanic)   Resp 14   Ht 1.6 m (5' 3\")   Wt 69.9 kg (154 lb 3.2 oz)   LMP 08/20/2022 (Approximate)   SpO2 100%   Breastfeeding No   BMI 27.32 kg/m   Estimated body mass index is 27.32 kg/m  as calculated from the following:    Height as of this encounter: 1.6 m (5' 3\").    Weight as of this encounter: 69.9 kg (154 lb 3.2 oz).  Medication Reconciliation: complete  Pamela M. Lechevalier, LPN    "

## 2022-11-08 ENCOUNTER — ALLIED HEALTH/NURSE VISIT (OUTPATIENT)
Dept: FAMILY MEDICINE | Facility: OTHER | Age: 22
End: 2022-11-08
Attending: NURSE PRACTITIONER
Payer: COMMERCIAL

## 2022-11-08 DIAGNOSIS — R05.9 COUGH: Primary | ICD-10-CM

## 2022-11-08 LAB
FLUAV RNA SPEC QL NAA+PROBE: NEGATIVE
FLUBV RNA RESP QL NAA+PROBE: NEGATIVE
RSV RNA SPEC NAA+PROBE: POSITIVE
SARS-COV-2 RNA RESP QL NAA+PROBE: NEGATIVE

## 2022-11-08 PROCEDURE — 87637 SARSCOV2&INF A&B&RSV AMP PRB: CPT | Mod: ZL

## 2022-11-10 ENCOUNTER — TELEPHONE (OUTPATIENT)
Dept: FAMILY MEDICINE | Facility: OTHER | Age: 22
End: 2022-11-10

## 2022-11-10 NOTE — LETTER
[facility]  [address]  [facility] [address]  Phone: [phone]    November 10, 2022        Meg Diallo  [address] [facility]  [address] [place] [id number]          To whom it may concern:    RE: Meg Ontiverosjannette    Patient reports being diagnosed with RSV on 11/8/2022 and missed work on 11/9/2022.      Please contact me for questions or concerns.      Sincerely,        Tiara Hidalgo, NP

## 2022-11-10 NOTE — TELEPHONE ENCOUNTER
Pt calling and needs work excuse for yesterday 11.9.2022.    She tested positive for RSV on 11.8.2022.    Please send via [employer].    Questions call back [phone]      Jessica Encarnacion RN

## 2023-01-05 ENCOUNTER — PRENATAL OFFICE VISIT (OUTPATIENT)
Dept: OBGYN | Facility: OTHER | Age: 23
End: 2023-01-05
Attending: OBSTETRICS & GYNECOLOGY
Payer: COMMERCIAL

## 2023-01-05 VITALS
WEIGHT: 156 LBS | SYSTOLIC BLOOD PRESSURE: 110 MMHG | HEIGHT: 63 IN | DIASTOLIC BLOOD PRESSURE: 60 MMHG | BODY MASS INDEX: 27.64 KG/M2

## 2023-01-05 DIAGNOSIS — Z34.81 ENCOUNTER FOR SUPERVISION OF OTHER NORMAL PREGNANCY IN FIRST TRIMESTER: ICD-10-CM

## 2023-01-05 PROBLEM — N92.1 MENORRHAGIA WITH IRREGULAR CYCLE: Status: RESOLVED | Noted: 2021-01-19 | Resolved: 2023-01-05

## 2023-01-05 PROBLEM — Z86.14 H/O METHICILLIN RESISTANT STAPHYLOCOCCUS AUREUS INFECTION: Status: RESOLVED | Noted: 2017-10-30 | Resolved: 2023-01-05

## 2023-01-05 PROBLEM — Z34.91 ENCOUNTER FOR SUPERVISION OF NORMAL PREGNANCY IN FIRST TRIMESTER: Status: ACTIVE | Noted: 2023-01-05

## 2023-01-05 PROBLEM — Z30.430 ENCOUNTER FOR INSERTION OF INTRAUTERINE CONTRACEPTIVE DEVICE (IUD): Status: RESOLVED | Noted: 2021-01-26 | Resolved: 2023-01-05

## 2023-01-05 PROBLEM — J45.22 MILD INTERMITTENT ASTHMA WITH STATUS ASTHMATICUS: Status: ACTIVE | Noted: 2022-08-18

## 2023-01-05 PROCEDURE — G0463 HOSPITAL OUTPT CLINIC VISIT: HCPCS

## 2023-01-05 PROCEDURE — 99214 OFFICE O/P EST MOD 30 MIN: CPT | Performed by: OBSTETRICS & GYNECOLOGY

## 2023-01-05 RX ORDER — PRENATAL VIT/IRON FUM/FOLIC AC 27MG-0.8MG
1 TABLET ORAL DAILY
Qty: 90 TABLET | Refills: 3 | Status: SHIPPED | OUTPATIENT
Start: 2023-01-05 | End: 2024-01-31

## 2023-01-05 RX ORDER — ALBUTEROL SULFATE 1.25 MG/3ML
1.25 SOLUTION RESPIRATORY (INHALATION) EVERY 6 HOURS PRN
COMMUNITY
End: 2024-01-31

## 2023-01-05 RX ORDER — PRENATAL VIT/IRON FUM/FOLIC AC 27MG-0.8MG
1 TABLET ORAL DAILY
COMMUNITY
End: 2023-01-05

## 2023-01-05 ASSESSMENT — PAIN SCALES - GENERAL: PAINLEVEL: NO PAIN (0)

## 2023-01-05 NOTE — PROGRESS NOTES
CHIEF COMPLAINT / REASON FOR VISIT  Patient presents for obstetrical confirmation exam    SUBJECTIVE  Meg Diallo is a 23 year old  with Patient's last menstrual period was 2022. and her Estimated Date of Delivery: Aug 8, 2023 determined by LMP. Gestational age is 9w2d. The patient presents for obstetrical confirmation exam. The patient reports a positive home pregnancy test on 2022. Her menstrual cycles were regular. Her last menstrual period was normal. This pregnancy is Unplanned, Desired. The patient is excited to be pregnant. Since becoming pregnant, she denies use of alcohol, tobacco and street drugs.  The patient has history of anxiety, depression, and bipolar disorder.  She stopped her Zoloft, Wellbutrin, and Ativan.  She denies any symptoms off medication.  The patient also has history of asthma and denies any difficulty breathing, wheezing, or shortness of breath.  She has not used her inhaler in the last month.  The patient has mild morning nausea but denies emesis.    Past Obstetric history:  x1.  Relationship with FOB: Boyfriend.  STD History: No STD history.  Last Pap smear history: Normal.  Nausea/vomiting: As above.  Vaginal bleeding: Denies.  Abdominal or pelvic pain, cramping, tenderness: Denies.  Difficulty urinating: Denies.  Breast tenderness: Denies.  Fatigue: Denies.  Headache or vision changes: Denies.  Depression symptoms: Denies symptoms off medication.  Other concerns: None.    ALLERGIES     Allergies   Allergen Reactions     Cymbalta      Lexapro [Escitalopram] Other (See Comments)     hypersensitivity to touch        MEDICATIONS    Current Outpatient Medications:      albuterol (ACCUNEB) 1.25 MG/3ML neb solution, Take 1.25 mg by nebulization every 6 hours as needed for shortness of breath, wheezing or cough, Disp: , Rfl:      albuterol (PROAIR HFA/PROVENTIL HFA/VENTOLIN HFA) 108 (90 Base) MCG/ACT inhaler, Inhale 2 puffs into the lungs every 6 hours as needed for  "shortness of breath / dyspnea or wheezing, Disp: 18 g, Rfl: 3     Prenatal Vit-Fe Fumarate-FA (PRENATAL MULTIVITAMIN W/IRON) 27-0.8 MG tablet, Take 1 tablet by mouth daily, Disp: 90 tablet, Rfl: 3     LORazepam (ATIVAN) 0.5 MG tablet, TAKE 1 TABLET BY MOUTH ONCE DAILY AS NEEDED FOR ANXIETY (Patient not taking: Reported on 1/5/2023), Disp: , Rfl:     REVIEW OF SYSTEMS  As per HPI, otherwise negative.    The patient's Medical Hx, Surgical Hx, Obstetrical Hx, Social Hx, and Family Hx have been reviewed and updated in the electronic medical record.    OBJECTIVE  /60   Ht 1.6 m (5' 3\")   Wt 70.8 kg (156 lb)   LMP 11/01/2022   BMI 27.63 kg/m      General:  Well-developed, well-nourished female in no apparent distress.  Mental:  Alert and oriented times three.  Affect pleasant.  Mood happy.  Abdomen: Soft, non tender, positive bowel sounds. No organomegaly. No rebound, no guarding. Fundal height below umbilicus.  Fetal heart tones not auscultated.  Cervix: deferred  Extremities:  No clubbing, cyanosis, or edema. Nontender bilaterally.    DIAGNOSTICS  12/09/2022 HCG positive    Office OB ultrasound shows intrauterine pregnancy at 8 2/7 wk, CRL 1.67 cm, positive  bpm, positive yolk sac, no adnexal masses or free fluid    ASSESSMENT / PLAN  1 Intrauterine pregnancy at 9w2d.  2 History of asthma  3 History of depression, anxiety, and bipolar disorder    - Problem list reviewed and updated.  - Congratulations is given to the patient.  - I will be more than happy to follow the patient during this pregnancy.  - I discussed prenatal labs.  - I recommend that we check prenatal labs in 2 weeks.  - New OB history and physical exam ordered.  - I discussed and ordered a first trimester obstetrics ultrasound.  Radiology will contact the patient to schedule.  - Prenatal vitamin prescription given to the patient.  - I reviewed routine obstetrical precautions, recommendations and instructions with the patient.  - I " reviewed miscarriage precautions with the patient.  - I discussed routine healthy diet recommendations, exercise recommendations and weight gain recommendations in pregnancy.  - Follow up in 2 weeks.    Jacobo Mcknight MD  Obstetrics and Gynecology

## 2023-01-07 ENCOUNTER — HEALTH MAINTENANCE LETTER (OUTPATIENT)
Age: 23
End: 2023-01-07

## 2023-01-18 LAB
ABO/RH(D): NORMAL
ANTIBODY SCREEN: NEGATIVE
SPECIMEN EXPIRATION DATE: NORMAL

## 2023-01-19 ENCOUNTER — PRENATAL OFFICE VISIT (OUTPATIENT)
Dept: OBGYN | Facility: OTHER | Age: 23
End: 2023-01-19
Attending: OBSTETRICS & GYNECOLOGY
Payer: COMMERCIAL

## 2023-01-19 VITALS
BODY MASS INDEX: 28 KG/M2 | DIASTOLIC BLOOD PRESSURE: 58 MMHG | SYSTOLIC BLOOD PRESSURE: 110 MMHG | WEIGHT: 158 LBS | HEIGHT: 63 IN

## 2023-01-19 DIAGNOSIS — Z34.81 ENCOUNTER FOR SUPERVISION OF OTHER NORMAL PREGNANCY IN FIRST TRIMESTER: ICD-10-CM

## 2023-01-19 DIAGNOSIS — Z12.4 SCREENING FOR MALIGNANT NEOPLASM OF CERVIX: Primary | ICD-10-CM

## 2023-01-19 LAB
AMPHETAMINES UR QL: NOT DETECTED
BARBITURATES UR QL SCN: NOT DETECTED
BENZODIAZ UR QL SCN: NOT DETECTED
BUPRENORPHINE UR QL: NOT DETECTED
CANNABINOIDS UR QL: NOT DETECTED
COCAINE UR QL SCN: NOT DETECTED
D-METHAMPHET UR QL: NOT DETECTED
ERYTHROCYTE [DISTWIDTH] IN BLOOD BY AUTOMATED COUNT: 13 % (ref 10–15)
HCT VFR BLD AUTO: 34.4 % (ref 35–47)
HGB BLD-MCNC: 12.1 G/DL (ref 11.7–15.7)
MCH RBC QN AUTO: 31 PG (ref 26.5–33)
MCHC RBC AUTO-ENTMCNC: 35.2 G/DL (ref 31.5–36.5)
MCV RBC AUTO: 88 FL (ref 78–100)
METHADONE UR QL SCN: NOT DETECTED
OPIATES UR QL SCN: NOT DETECTED
OXYCODONE UR QL SCN: NOT DETECTED
PCP UR QL SCN: NOT DETECTED
PLATELET # BLD AUTO: 284 10E3/UL (ref 150–450)
PROPOXYPH UR QL: NOT DETECTED
RBC # BLD AUTO: 3.9 10E6/UL (ref 3.8–5.2)
TRICYCLICS UR QL SCN: NOT DETECTED
TSH SERPL DL<=0.005 MIU/L-ACNC: 0.88 UIU/ML (ref 0.3–4.2)
WBC # BLD AUTO: 9.7 10E3/UL (ref 4–11)

## 2023-01-19 PROCEDURE — G0123 SCREEN CERV/VAG THIN LAYER: HCPCS | Mod: ZL | Performed by: OBSTETRICS & GYNECOLOGY

## 2023-01-19 PROCEDURE — 84443 ASSAY THYROID STIM HORMONE: CPT | Mod: ZL | Performed by: OBSTETRICS & GYNECOLOGY

## 2023-01-19 PROCEDURE — 86803 HEPATITIS C AB TEST: CPT | Mod: ZL | Performed by: OBSTETRICS & GYNECOLOGY

## 2023-01-19 PROCEDURE — 87389 HIV-1 AG W/HIV-1&-2 AB AG IA: CPT | Mod: ZL | Performed by: OBSTETRICS & GYNECOLOGY

## 2023-01-19 PROCEDURE — 87491 CHLMYD TRACH DNA AMP PROBE: CPT | Mod: ZL | Performed by: OBSTETRICS & GYNECOLOGY

## 2023-01-19 PROCEDURE — 36415 COLL VENOUS BLD VENIPUNCTURE: CPT | Mod: ZL | Performed by: OBSTETRICS & GYNECOLOGY

## 2023-01-19 PROCEDURE — 86780 TREPONEMA PALLIDUM: CPT | Mod: ZL | Performed by: OBSTETRICS & GYNECOLOGY

## 2023-01-19 PROCEDURE — 85018 HEMOGLOBIN: CPT | Mod: ZL | Performed by: OBSTETRICS & GYNECOLOGY

## 2023-01-19 PROCEDURE — 87340 HEPATITIS B SURFACE AG IA: CPT | Mod: ZL | Performed by: OBSTETRICS & GYNECOLOGY

## 2023-01-19 PROCEDURE — 86901 BLOOD TYPING SEROLOGIC RH(D): CPT | Performed by: OBSTETRICS & GYNECOLOGY

## 2023-01-19 PROCEDURE — G0463 HOSPITAL OUTPT CLINIC VISIT: HCPCS

## 2023-01-19 PROCEDURE — 80306 DRUG TEST PRSMV INSTRMNT: CPT | Mod: ZL | Performed by: OBSTETRICS & GYNECOLOGY

## 2023-01-19 PROCEDURE — 99207 PR FIRST OB VISIT: CPT | Performed by: OBSTETRICS & GYNECOLOGY

## 2023-01-19 PROCEDURE — 86762 RUBELLA ANTIBODY: CPT | Mod: ZL | Performed by: OBSTETRICS & GYNECOLOGY

## 2023-01-19 RX ORDER — CLINDAMYCIN HCL 150 MG
300 CAPSULE ORAL
COMMUNITY
Start: 2023-01-18 | End: 2023-01-25

## 2023-01-19 ASSESSMENT — ANXIETY QUESTIONNAIRES
1. FEELING NERVOUS, ANXIOUS, OR ON EDGE: NEARLY EVERY DAY
GAD7 TOTAL SCORE: 17
6. BECOMING EASILY ANNOYED OR IRRITABLE: NEARLY EVERY DAY
5. BEING SO RESTLESS THAT IT IS HARD TO SIT STILL: NEARLY EVERY DAY
7. FEELING AFRAID AS IF SOMETHING AWFUL MIGHT HAPPEN: MORE THAN HALF THE DAYS
IF YOU CHECKED OFF ANY PROBLEMS ON THIS QUESTIONNAIRE, HOW DIFFICULT HAVE THESE PROBLEMS MADE IT FOR YOU TO DO YOUR WORK, TAKE CARE OF THINGS AT HOME, OR GET ALONG WITH OTHER PEOPLE: SOMEWHAT DIFFICULT
3. WORRYING TOO MUCH ABOUT DIFFERENT THINGS: MORE THAN HALF THE DAYS
2. NOT BEING ABLE TO STOP OR CONTROL WORRYING: MORE THAN HALF THE DAYS
GAD7 TOTAL SCORE: 17

## 2023-01-19 ASSESSMENT — PAIN SCALES - GENERAL: PAINLEVEL: NO PAIN (0)

## 2023-01-19 ASSESSMENT — PATIENT HEALTH QUESTIONNAIRE - PHQ9
5. POOR APPETITE OR OVEREATING: MORE THAN HALF THE DAYS
SUM OF ALL RESPONSES TO PHQ QUESTIONS 1-9: 13

## 2023-01-19 NOTE — PROGRESS NOTES
CHIEF COMPLAINT / REASON FOR VISIT  Patient presents for first obstetrical history and physical exam.    SIMONA Diallo is a 23 year old  with Patient's last menstrual period was 2022. and her Estimated Date of Delivery: Aug 8, 2023 determined by LMP and consistent with 10 wk ultrasound. Gestational age is 11w2d. The patient presents for first obstetrical history and physical exam. Her menstrual cycles were regular. Her last menstrual period was normal. This pregnancy is Planned, Desired. The patient is excited to be pregnant. Since becoming pregnant, she denies use of alcohol, tobacco and street drugs.  The patient stopped her Zoloft, Wellbutrin, and Ativan when she became pregnant.  She has a history of depression, anxiety, and bipolar disorder.  She does have some increased depression but denies anxiety symptoms.  No suicidal thoughts.  The patient's history of asthma but denies any difficulty breathing, wheezing, or shortness of breath.  She has not used her albuterol inhaler in over 6 to 8 weeks.  She continues to have mild morning nausea but denies emesis.    The patient was seen in the emergency room yesterday for a cutaneous abscess on the side of her nose.  She was started on clindamycin antibiotic.  She reports that she has had similar abscess on her nose several times in the past.  She denies fever or chills, chest pain, shortness of breath.    Past Obstetric history:  x1.  Relationship with FOB: Boyfriend.  STD History: No STD history.  Last Pap smear history: Normal.  Nausea/vomiting: As above.  Vaginal bleeding: Denies.  Leaking of fluid: Denies.  Abdominal or pelvic pain, cramping, contractions, tenderness: Denies.  Difficulty urinating: Denies.  Breast tenderness: Denies.  Fatigue: Denies.  Headache or vision changes: Denies.  Depression symptoms: Mild depression symptoms as above.  Other concerns: None.    ALLERGIES     Allergies   Allergen Reactions     Cymbalta       Lexapro [Escitalopram] Other (See Comments)     hypersensitivity to touch      Duloxetine Other (See Comments)       MEDICATIONS    Current Outpatient Medications:      albuterol (ACCUNEB) 1.25 MG/3ML neb solution, Take 1.25 mg by nebulization every 6 hours as needed for shortness of breath, wheezing or cough, Disp: , Rfl:      albuterol (PROAIR HFA/PROVENTIL HFA/VENTOLIN HFA) 108 (90 Base) MCG/ACT inhaler, Inhale 2 puffs into the lungs every 6 hours as needed for shortness of breath / dyspnea or wheezing, Disp: 18 g, Rfl: 3     clindamycin (CLEOCIN) 150 MG capsule, Take 300 mg by mouth, Disp: , Rfl:      Prenatal Vit-Fe Fumarate-FA (PRENATAL MULTIVITAMIN W/IRON) 27-0.8 MG tablet, Take 1 tablet by mouth daily, Disp: 90 tablet, Rfl: 3    REVIEW OF SYSTEMS  General:  No fever, chills, fatigue, unintentional weight loss, or weight gain  HEENT:  No sore throat, nasal congestion, changes in vision or changes in hearing  Cardiovascular:  No chest pain, irregular heartbeat or racing heart  Respiratory:  No shortness of breath, cough, or wheezing  Gastrointestinal:  No nausea, vomiting, diarrhea, constipation or abdominal pain  Genitourinary:  No leaking urine, pain with urination, burning with urination, irregular vaginal bleeding, heavy periods, painful periods, abnormal vaginal discharge or leaking gas or stool  Skin:  No rashes or skin lesions  Breasts:  No masses or lumps, positive for discharge from the nipples  Neuro:  No difficulty with memory, numbness, tingling, or falls  Psych:  No anxiety, depression or difficulty sleeping  Endocrine:  No excessive thirst, hair loss, intolerance of heat or cold    Past Medical History:   Diagnosis Date     Anxiety state 1/6/2012    Overview:  IMO Update 10/11     Attention deficit disorder of childhood without me 03/05/2007     Bipolar disorder, unspecified (H) 5/5/2016     Depressive disorder      Facial hemangioma 07/23/2012     Major depressive disorder, recurrent episode,  moderate (H) 2016     Menorrhagia with irregular cycle 2021     Mild intellectual disabilities 2012    IMO Update 10/11     Mild intermittent asthma with status asthmaticus 2022     Nasal turbinate hypertrophy 2012     Tonsillar hypertrophy 2012       Past Surgical History:   Procedure Laterality Date     HEAD & NECK SURGERY      laser surgery to birthmark on left side of face     LAPAROSCOPIC CYSTECTOMY OVARIAN (ONCOLOGY) Left 2019    Procedure: LAPAROSCOPY TREAT LEFT OVARIAN CYST, MULTIPLE ASPIRATIONS LEFT OVARIAN CYST;  Surgeon: Frow, David Franke, MD;  Location: [facility]       OB History    Para Term  AB Living   2 1 1 0 0 1   SAB IAB Ectopic Multiple Live Births   0 0 0 0 1      # Outcome Date GA Lbr [place]/2nd Weight Sex Delivery Anes PTL Lv   2 Current            1 Term 17 39w6d  3.559 kg (7 lb 13.5 oz) F Vag-Spont  N ENMANUEL      Name: YAMIL,GIRL A BETH JOSE      Apgar1: 7  Apgar5: 9       Social History     Tobacco Use     Smoking status: Never     Smokeless tobacco: Never   Substance Use Topics     Alcohol use: No     History   Sexual Activity     Sexual activity: Yes     Partners: Male     Birth control/ protection: None       Family History   Problem Relation Age of Onset     Bipolar Disorder Mother      Depression Mother      Heart Disease Father      Diabetes Other      Myocardial Infarction Paternal Grandfather      Lung Cancer Paternal Grandfather      Other - See Comments Paternal Aunt         mulitiple sclerosis     Breast Cancer Maternal Grandfather         x 2       Immunization History   Administered Date(s) Administered     COVID-19 Vaccine 12+ (Pfizer) 2021     Comvax (HIB/HepB) 2000, 2000, 2004     DTAP (<7y) 2000, 2000, 2000, 2002, 2005     FLU 6-35 months 2013     HPV 2012, 2013, 2015     Influenza (IIV3) PF 2013     Influenza Vaccine >6 months (Alfuria,Fluzone)  "01/08/2015, 09/14/2017, 10/26/2018, 10/26/2018, 12/26/2019, 10/07/2020, 09/15/2022     Influenza Vaccine, 6+MO IM (QUADRIVALENT W/PRESERVATIVES) 11/05/2015     MMR 11/26/2002, 07/25/2005     Poliovirus, inactivated (IPV) 2000, 2000, 11/26/2002, 07/25/2005     TDAP Vaccine (Adacel) 09/14/2017     Tdap (Adacel,Boostrix) 01/18/2012     Varicella 01/16/2001, 01/18/2012       OBJECTIVE  /58   Ht 1.6 m (5' 3\")   Wt 71.7 kg (158 lb)   LMP 11/01/2022   BMI 27.99 kg/m      General:  Well-developed, well-nourished female in no apparent distress.  Mental:  Alert and oriented times three.  Affect pleasant.  Mood happy.  Neurological: Alert and oriented x3. Cranial nerves II through XII grossly intact.  Reflexes 2+ at patella bilaterally.  No gross motor or sensory deficits noted.  Skin: There is a small cutaneous abscess on the left side of her nose.  Nontender and nondraining.  Mild erythema without induration.  Head:  Normocephalic, atraumatic  ENT:  Posterior oropharynx without erythema or exudates. Normal dentition. Trachea midline, no cervical adenopathy, Vision and hearing grossly intact. Tympanic membranes pearly white bilaterally with positive light reflex. Nasal turbinates are not erythematous or edematous.  Eyes:  Sclerae without injection or icterus.  Pupils equally round and reactive to light and accommodation bilaterally.  Lymph Nodes:  Neck supple.  No cervical, supraclavicular or axillary lymphadenopathy.  Thyroid:  No thyromegaly or tenderness bilaterally.  Breast:  Breasts are bilaterally symmetrical. No masses, tenderness, retractions, skin changes, erythema, discharge or adenopathy bilaterally.  Lungs:  Clear to auscultation bilaterally with good inspiratory effort.  No wheezing rhonchi or rales noted. Breathing nonlabored.  Heart:  Regular rate and rhythm without murmur. No JVD.  No peripheral vascular disease.  Abdomen: Soft, non tender, nondistended, positive bowel sounds.  No " organomegaly. No rebound, no guarding. Fundal height not palpated.  Fetal heart tones auscultated at 158.  Pelvic exam:  Normal external female genitalia without lesions or abnormalities. Normal pubic hair distribution. Urethral meatus normal in appearance and without masses. Normal Bartholin, Urethral and Long Barn's glands.  Well estrogenized vaginal mucosa. No vaginal lesions. The bladder and urethra are nontender to palpation. Normal multiparous cervix without lesions or abnormalities. The cervix is long, closed and thick.  No cervical motion tenderness to palpation. Uterus is 12 wk size, mobile, and non tender.  No adnexal masses or tenderness bilaterally.  Rectal exam:  No external hemorrhoids noted. No rectovaginal nodularity noted. Examination confirms the vaginal exam.  Extremities:  No clubbing cyanosis or edema. Non tender bilaterally.  Spine:  No spinal, paraspinal or costovertebral angle tenderness bilaterally.  Gait:  Normal.      Chaperone: Yael Reyes LPN    DIAGNOSTICS  2023 Pap pending  2023 NOB labs pending    2023 OB ultrasound: 10 0 wk, CRL is 3.1 cm, Yolk sac visualized, positive Cardiac activity, no adnexal mass, EDC is 2023.    PHQ-9 score:    PHQ 2023   PHQ-9 Total Score 13   Q9: Thoughts of better off dead/self-harm past 2 weeks Not at all   F/U: Thoughts of suicide or self-harm -   F/U: Self harm-plan -   F/U: Self-harm action -   F/U: Safety concerns -       Pregnancy Dating:   LMP: 2022  LUIS EDUARDO by LMP calculator: 2023  LUIS EDUARDO consitent with LMP by 10 wk ultrasound: 2023  [facility]  [place]'s provider: Dr. Owen    ASSESSMENT / PLAN  23 year old  at 11w2d who presents for first obstetrical history and physical exam.    1 Intrauterine pregnancy at 11w2d.  2 Cutaneous abscess face at 11 wk (2023)  3 History of asthma  4 History of depression, anxiety, and bipolar disorder  5 Nausea in  pregnancy    Anticipated course of pregnancy care:  - I discussed what to expect in regards to pregnancy care including frequency of visits, routine prenatal labs, obstetric ultrasounds and immunizations during pregnancy, and how to contact both the clinic and the hospital. The patient asked appropriate questions these were answered for her.    Labs:  - Prenatal labs are ordered today.  - Pap smear obtained today.  - I discussed options for screening for and diagnosis of chromosomal anomalies, including first trimester screen, noninvasive prenatal testing/cell-free fetal DNA testing, CVS/amniocentesis, quad screen, and fetal survey ultrasound or comprehensive Level II US at 18-20 weeks. She desires noninvasive prenatal testing and ultrasound at 18-20 weeks.    Immunizations:  - Plan TdaP at 28 weeks.  - Influenza vaccine up-to-date.    - Problem list reviewed and updated.  - I recommend that the patient take prenatal vitamins.  - I discussed first trimester obstetrics ultrasound.  - I discussed use of albuterol MDI in pregnancy.  - I recommend the patient see her primary provider for asthma action plan in pregnancy.  - Asthma precautions in pregnancy are reviewed with the patient.  - I recommend the patient finish full course of clindamycin antibiotic as prescribed.  If her symptoms worsen I recommend she see her primary provider or present to the emergency room.  - I recommend patient see her mental health counselor for depression, anxiety, and bipolar disease follow-up.  I discussed having the patient restart her antidepressant medications.  I do not recommend use of Ativan in pregnancy.  - The patient stopped her Zoloft, Wellbutrin, and Ativan medication earlier this pregnancy.  - Symptomatic relief measures for nausea and vomiting in pregnancy reviewed with the patient.  - I reviewed routine obstetrical precautions, recommendations and instructions with the patient including fetal movement and kick count  instructions.  - I reviewed miscarriage precautions with the patient.  - I discussed routine healthy diet recommendations, foods to avoid in pregnancy, exercise recommendations and weight gain recommendations in pregnancy, avoiding exposure to toxoplasmosis, and maintenance of a generally healthy lifestyle.  - BMI is Body mass index is 27.99 kg/m .. Recommended weight gain is: 15-25 lbs (pregravid BMI 25-29.9).  - I recommend the patient see clinic nurse for OB education visit.  - Follow up in 4 weeks. She is encouraged to call sooner with questions or concerns.    Jacobo Mcknight MD  Obstetrics and Gynecology

## 2023-01-19 NOTE — PROGRESS NOTES
Have you had or do you currently have:    - Diabetes? n  - Hypertension? n  - Heart disease, mitral valve prolapse, or rheumatic fever?  n  - An autoimmune disease such as lupus or rheumatoid arthritis?  n  - Kidney disease, urinary tract infection?  y  - Epilepsy, seizures, or spells?  n  - Migraine headaches?  y  - Any other neurological problems?  n  - Have you ever been treated for depression?  y  - Are you having problems with crying spells or loss of self-esteem?  y  - Have you ever required psychiatric care?  n  - Have you ever had hepatitis, liver disease, or jaundice?  n  - Have you ever been treated for blood clots in your veins, deep vein thrombosis, inflammation in the veins, thrombosis, phelbitis, pulmonary embolism or varicosities?  n  - Have you had excessive bleeding after surgery or dental work?  n  - Do you bleed more than other women after a cut or scratch?  n  - Do you have a history or anemia?  n  - Have you ever had thyroid problems or take thyroid medication?  n  - Do you have any endocrine problems?  n  - Have you every been in a major accident or suffered serious trauma?  n  - Within the last year, has anyone hit, slapped, kicked, or otherwise hurt you?  n  - In the last year, has anyone forced you to have sex when you didn't want to?  n  - Have you every received a blood transfusion?  n  - Would you refuse a blood transfusion if a doctor judged it to be medically necessary?  n  - Does anyone in your home smoke? n  - Do you use tobacco products?  n  - Do you drink beer, wine, or hard liquor?  n  - Do you use any of the following: marijuana, speed, cocaine, heroin, hallucinogens, or other drugs?  n  - Is your blood type RH negative?  n  - Have you ever had asthma?  y  - Have you ever had tuberculosis?  n  - Do you have any allergies to drugs or over-the-counter medications?  y  - Allergies: dust mites, aspartame, ethanol, venlafaxine hydrochloride, sertraline?  Did not answer  - Have you had  any breast problems?  n  - Have you ever breast-fed?  y  - Have you had any gynecological surgical procedures such as cervical conization, LEEP, laser treatment, cryosurgery of the cervix, or a dilatation and curettage, etc?  y  - Have you ever had any other surgical procedures?  y  - Have you ever had any anesthetic complications?  n  - Have you ever had an abnormal pap smear?  n  - Do you have a history of abnormalities of the uterus? n  - Did your mother take NIKOLAY or any other hormones when she was pregnant with you?  n  - Did it take you more than one year to become pregnant?  n  - Have you ever been evaluated or treated for infertility?  n  - Is there a history of medical problems in your family, which you feel might adversely affect your health or pregnancy?  n  - Do you have any other problems we have not asked about which you feel may be important to this pregnancy?  n    Symptoms since last menstrual period  - Do you currently have any of the following symptoms: abdominal pain, blood in the stool or urine, chest pain, shortness of breath, coughing or vomiting up blood, you heart is racing or skipping beats, nausea and vomiting, pain on urination, or vaginal discharge or bleeding?  n    Genetic screening  Has the patient, baby's father, or anyone in either family had:  - Thalassemia (Italian, Greek, Mediterranean, or  background only) and an MCV result less than 80?  n  - Neural tube defect such as meningomyelocele, spina bifida, or anencephaly?  nn  - Congential heart defect?  n  - Down's syndrome?  n  - Johnathan-Sachs disease ( Bahai, Cajun, Croatian-[place])?  n  - Sickle cell disease or trait () ?  n  - Hemophilia or other inherited problems of blood?  n  - Muscular dystrophy?  n  - Cystic fibrosis?  n  - [place]'s chorea?  n  - Mental retardation/autism?  y   If yes, was the person tested for Fragile X?  n  - Any other inherited genetic or chromosomal disorder?  n  - Maternal metabolic disorder  (e.g. insulin- dependent diabetes, PKU)?  y  - A child with birth defects not listed above?  y  - Recurrent pregnancy loss, or a stillbirth?  y  - Has the patient had any medications/street drugs/alcohol since her last menstrual period?  n  - Does the patient or baby's father have any other genetic risk?  n    Infection history  - Have you ever been treated for tuberculosis?  n  - Have you every had a positive skin test for tuberculosis?  n  - Do you live with someone who has tuberculosis?  n  - Have you ever been exposed to tuberculosis?  n  - Do you have genital herpes?  n  - Does your partner have genital herpes?  n  - Have you had a rash or viral illness since your last period?  n  - Have you ever had gonorrhea, chlamydia, syphilis, venereal warts, trichomoniasis, pelvic inflammatory disease, or any other sexually transmitted disease?  n  - Have you had chicken pox?  n  - Have you been vaccinated against chicken pox?  n  - Have you had any other infectious diseases?  n

## 2023-01-20 LAB
C TRACH DNA SPEC QL PROBE+SIG AMP: NEGATIVE
N GONORRHOEA DNA SPEC QL NAA+PROBE: NEGATIVE

## 2023-01-21 LAB
HBV SURFACE AG SERPL QL IA: NONREACTIVE
HCV AB SERPL QL IA: NONREACTIVE
HIV 1+2 AB+HIV1 P24 AG SERPL QL IA: NONREACTIVE
T PALLIDUM AB SER QL: NONREACTIVE

## 2023-01-23 LAB
RUBV IGG SERPL QL IA: 16.7 INDEX
RUBV IGG SERPL QL IA: POSITIVE

## 2023-01-25 LAB
BKR LAB AP GYN ADEQUACY: NORMAL
BKR LAB AP GYN INTERPRETATION: NORMAL
BKR LAB AP HPV REFLEX: NO
BKR LAB AP LMP: NORMAL
BKR LAB AP PREVIOUS ABNORMAL: NORMAL
PATH REPORT.COMMENTS IMP SPEC: NORMAL
PATH REPORT.COMMENTS IMP SPEC: NORMAL
PATH REPORT.RELEVANT HX SPEC: NORMAL

## 2023-02-16 ENCOUNTER — PRENATAL OFFICE VISIT (OUTPATIENT)
Dept: OBGYN | Facility: OTHER | Age: 23
End: 2023-02-16
Attending: OBSTETRICS & GYNECOLOGY
Payer: COMMERCIAL

## 2023-02-16 VITALS
BODY MASS INDEX: 28.45 KG/M2 | WEIGHT: 160.6 LBS | SYSTOLIC BLOOD PRESSURE: 100 MMHG | OXYGEN SATURATION: 99 % | DIASTOLIC BLOOD PRESSURE: 70 MMHG | HEART RATE: 86 BPM

## 2023-02-16 DIAGNOSIS — Z34.82 ENCOUNTER FOR SUPERVISION OF OTHER NORMAL PREGNANCY IN SECOND TRIMESTER: Primary | ICD-10-CM

## 2023-02-16 PROBLEM — Z34.92 ENCOUNTER FOR SUPERVISION OF NORMAL PREGNANCY IN SECOND TRIMESTER: Status: ACTIVE | Noted: 2023-01-05

## 2023-02-16 PROCEDURE — G0463 HOSPITAL OUTPT CLINIC VISIT: HCPCS

## 2023-02-16 PROCEDURE — 99207 PR COMPLICATED OB VISIT: CPT | Performed by: OBSTETRICS & GYNECOLOGY

## 2023-02-16 PROCEDURE — 36415 COLL VENOUS BLD VENIPUNCTURE: CPT | Mod: ZL | Performed by: OBSTETRICS & GYNECOLOGY

## 2023-02-16 RX ORDER — PRENATAL WITH FERROUS FUM AND FOLIC ACID 3080; 920; 120; 400; 22; 1.84; 3; 20; 10; 1; 12; 200; 27; 25; 2 [IU]/1; [IU]/1; MG/1; [IU]/1; MG/1; MG/1; MG/1; MG/1; MG/1; MG/1; UG/1; MG/1; MG/1; MG/1; MG/1
1 TABLET ORAL DAILY
COMMUNITY
Start: 2023-02-14 | End: 2023-02-16

## 2023-02-16 ASSESSMENT — PAIN SCALES - GENERAL: PAINLEVEL: MODERATE PAIN (4)

## 2023-02-16 NOTE — PROGRESS NOTES
SUBJECTIVE  Meg Diallo is a 23 year old  with Patient's last menstrual period was 2022. and her Estimated Date of Delivery: Aug 8, 2023 determined by LMP and consistent with 10 wk ultrasound. Gestational age is 15w2d. She presents for follow up OB visit.    She is doing well. Her nausea has resolved. She isn't feeling fetal movement yet. She denies leaking of fluid, vaginal bleeding, cramping or pelvic pressure. She denies abnormal vaginal discharge, difficulty urinating, headache or vision changes, fever or chills. She denies depression or anxiety symptoms off medication.  She denies difficulty breathing, asthma, or wheezing symptoms. She has not used her albuterol inhaler in over 6 to 8 weeks.  The patient is having difficulty at work and gets cramping with heavy lifting this resolves with rest.  The patient is requesting a work note to restrict heavy lifting.    OBJECTIVE  /70   Pulse 86   Wt 72.8 kg (160 lb 9.6 oz)   LMP 2022   SpO2 99%   BMI 28.45 kg/m      General:  Well-developed well-nourished female in no apparent distress. Alert and oriented x3.  Abdomen: Soft, non tender, positive bowel sounds. Fundal height below umbilicus.  Fetal heart tones auscultated at 160.  Cervix: deferred  Extremities:  No clubbing, cyanosis, or edema. Nontender bilaterally.    DIAGNOSTICS  2023 Type and Rh: A Positive, ABS: Negative, Rubella: Positive, Treponema: Negative, HBsAg: Negative, Hepatitis C: Negative, HIV: Negative, Hgb: 12.1, Hematocrit: 34.4, Plt: 284, TSH: 0.88, Chlamydia: Negative, Gonorrhea: Negative, UDS: Negative  2023 Pap NILM  2023 NIPT pending    Pregnancy Dating:   LMP: 2022  LUIS EDUARDO by LMP calculator: 2023  LUIS EDUARDO consitent with LMP by 10 wk ultrasound: 2023  [facility] Delivery: [facility]  's provider: Dr. Owen    ASSESSMENT / PLAN  1 Intrauterine pregnancy at 15w2d.  2 Cutaneous abscess face at 11 wk  (01/18/2023)  3 History of asthma  4 History of depression, anxiety, and bipolar disorder  5 Nausea in pregnancy, resolved    - Problem list reviewed and updated.  - Prenatal labs reviewed.  - I discussed quad screen and NIPT screen. The patient desires screening.  - I discussed and ordered an OB ultrasound for anatomical survey.  Radiology will contact the patient to schedule.  - I discussed use of albuterol MDI in pregnancy.  - I recommend the patient see her primary provider for asthma action plan in pregnancy.  - Asthma precautions in pregnancy are reviewed with the patient.  - I recommend patient see her mental health counselor for depression, anxiety, and bipolar disease follow-up.  - Depression precautions from pregnancy are reviewed the patient.  - I reviewed routine obstetrical precautions, recommendations and instructions with the patient.  - I reviewed routine miscarriage precautions with the patient.  - Follow up in 4 weeks.    Jacobo Mcknight MD  Obstetrics and Gynecology

## 2023-02-16 NOTE — LETTER
[facility]  [address] [facility] [id number] [phone]      February 16, 2023      RE: Meg Diallo   [address] [place] [id number]      To whom it may concern:    Meg Diallo is under my professional care for her prenatal care. She is pregnant and her Estimated Date of Delivery: Aug 8, 2023. She may return to work with the following: The employee is ABLE to return to work today.    I recommend general accommodations in pregnancy including permission to take more frequent bathroom and rest breaks, permission to eat small snacks, and having water bottle at work station.    I recommend the following restrictions until delivery:  No heavy lifting over 25-30 pounds.    If you have additional questions or concerns, please call the OB/GYN Department, [facility] at [phone].    Thank you for your help with this matter.      Sincerely,        Jacobo Mcknight MD    [facility]  [address] [facility] [facility] [address]  Phone: [phone]  Fax: [phone]

## 2023-03-14 LAB — SCANNED LAB RESULT: NORMAL

## 2023-03-21 ENCOUNTER — TELEPHONE (OUTPATIENT)
Dept: OBGYN | Facility: OTHER | Age: 23
End: 2023-03-21

## 2023-03-21 NOTE — TELEPHONE ENCOUNTER
Yes, that should not be a problem in pregnancy.  You should make sure that you are in a well ventilated area.   You should read the instructions and warnings on any  that you use to make sure that it is safe to use in pregnancy.   Jacobo Mcknight MD
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
## 2023-03-23 ENCOUNTER — PRENATAL OFFICE VISIT (OUTPATIENT)
Dept: OBGYN | Facility: OTHER | Age: 23
End: 2023-03-23
Attending: OBSTETRICS & GYNECOLOGY
Payer: COMMERCIAL

## 2023-03-23 VITALS — BODY MASS INDEX: 29.58 KG/M2 | SYSTOLIC BLOOD PRESSURE: 104 MMHG | WEIGHT: 167 LBS | DIASTOLIC BLOOD PRESSURE: 64 MMHG

## 2023-03-23 DIAGNOSIS — Z34.82 ENCOUNTER FOR SUPERVISION OF OTHER NORMAL PREGNANCY IN SECOND TRIMESTER: Primary | ICD-10-CM

## 2023-03-23 PROCEDURE — G0463 HOSPITAL OUTPT CLINIC VISIT: HCPCS

## 2023-03-23 PROCEDURE — 99207 PR COMPLICATED OB VISIT: CPT | Performed by: OBSTETRICS & GYNECOLOGY

## 2023-03-23 ASSESSMENT — PAIN SCALES - GENERAL: PAINLEVEL: NO PAIN (0)

## 2023-03-23 NOTE — LETTER
[facility]  [address] [facility] [id number] [phone]      March 23, 2023      RE: Meg Diallo   [address] [place] [id number]      To whom it may concern:    Meg Diallo is under my professional care for her prenatal care. She is pregnant and her Estimated Date of Delivery: Aug 8, 2023. She is having a complication in pregnancy and will need to be at home rest for the next 2 weeks. Please excuse her from work until 04/05/2023.    If you have additional questions or concerns, please call the OB/GYN Department, [facility] at [phone].    Thank you for your help with this matter.      Sincerely,        Jacobo Mcknight MD    [address] [address]  Phone: [phone]  Fax: [phone]
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
## 2023-04-20 ENCOUNTER — PRENATAL OFFICE VISIT (OUTPATIENT)
Dept: OBGYN | Facility: OTHER | Age: 23
End: 2023-04-20
Attending: OBSTETRICS & GYNECOLOGY
Payer: COMMERCIAL

## 2023-04-20 VITALS — DIASTOLIC BLOOD PRESSURE: 62 MMHG | WEIGHT: 175 LBS | SYSTOLIC BLOOD PRESSURE: 92 MMHG | BODY MASS INDEX: 31 KG/M2

## 2023-04-20 DIAGNOSIS — Z34.82 ENCOUNTER FOR SUPERVISION OF OTHER NORMAL PREGNANCY IN SECOND TRIMESTER: Primary | ICD-10-CM

## 2023-04-20 PROCEDURE — 99207 PR PRENATAL VISIT: CPT | Performed by: OBSTETRICS & GYNECOLOGY

## 2023-04-20 PROCEDURE — G0463 HOSPITAL OUTPT CLINIC VISIT: HCPCS

## 2023-04-20 ASSESSMENT — PAIN SCALES - GENERAL: PAINLEVEL: NO PAIN (0)

## 2023-04-20 NOTE — PROGRESS NOTES
SUBJECTIVE  Meg Diallo is a 23 year old  with Patient's last menstrual period was 2022.. Estimated Date of Delivery: Aug 8, 2023. Gestational age is 24w2d. She presents for follow up OB visit.    She has no complaints. She feels good fetal movement. She denies leaking of fluid, vaginal bleeding, cramping or pelvic pressure. She denies abnormal vaginal discharge, difficulty urinating, headache or vision changes, fever or chills. She denies anxiety or depression symptoms.  She denies asthma symptoms, wheezing, or difficulty breathing.  She has not used her inhaler in 4 months.    OBJECTIVE  BP 92/62   Wt 79.4 kg (175 lb)   LMP 2022   BMI 31.00 kg/m      General:  Well-developed well-nourished gravid female in no apparent distress. Alert and oriented x3.  Abdomen: Soft, gravid, non tender, positive bowel sounds. Fundal height at 24 cm. Fetal heart tones auscultated at 140.  Cervix: deferred  Extremities:  No clubbing, cyanosis, or edema. Nontender bilaterally.    DIAGNOSTICS  2023 OB ultrasound: 23 6/7 wk, 549 gm, cephalic presentation, posterior placenta, no placenta previa, normal ERI, appropriate interval fetal growth, normal fetal anatomy survey now complete.    ASSESSMENT / PLAN  1 Intrauterine pregnancy at 24w2d.  2 History of asthma  3 History of depression, anxiety, and bipolar disorder    - Problem list reviewed and updated.  - Pregnancy weight gain has been 8.68 kg (19 lb 2.2 oz) to date, which is adequate.   - I reviewed the OB ultrasound with the patient.  - I recommend that the patient have a Glucose screen, Treponema and CBC at 26-28 weeks.  - I discussed use of albuterol MDI in pregnancy.  - I recommend the patient see her primary provider for asthma action plan in pregnancy.  - Asthma precautions in pregnancy are reviewed with the patient.  - I recommend patient see her mental health counselor for depression, anxiety, and bipolar disease follow-up.  - I reviewed routine  obstetrical precautions, recommendations and instructions with the patient including fetal movement and kick count instructions.  - I reviewed  labor precautions with the patient with instructions for the patient to come in for decreased fetal movement, suspected rupture of membranes, regular contraction pattern, vaginal bleeding or any other concerning symptoms.  - Follow up in 4 weeks.    Jacobo Mcknight MD  Obstetrics and Gynecology

## 2023-05-11 ENCOUNTER — PRENATAL OFFICE VISIT (OUTPATIENT)
Dept: OBGYN | Facility: OTHER | Age: 23
End: 2023-05-11
Attending: OBSTETRICS & GYNECOLOGY
Payer: COMMERCIAL

## 2023-05-11 ENCOUNTER — OFFICE VISIT (OUTPATIENT)
Dept: FAMILY MEDICINE | Facility: OTHER | Age: 23
End: 2023-05-11
Attending: OBSTETRICS & GYNECOLOGY
Payer: COMMERCIAL

## 2023-05-11 ENCOUNTER — LAB (OUTPATIENT)
Dept: LAB | Facility: OTHER | Age: 23
End: 2023-05-11
Payer: COMMERCIAL

## 2023-05-11 VITALS
SYSTOLIC BLOOD PRESSURE: 106 MMHG | DIASTOLIC BLOOD PRESSURE: 66 MMHG | OXYGEN SATURATION: 100 % | WEIGHT: 177 LBS | HEIGHT: 63 IN | TEMPERATURE: 97.6 F | HEART RATE: 87 BPM | RESPIRATION RATE: 18 BRPM | BODY MASS INDEX: 31.36 KG/M2

## 2023-05-11 VITALS — DIASTOLIC BLOOD PRESSURE: 66 MMHG | WEIGHT: 177 LBS | SYSTOLIC BLOOD PRESSURE: 106 MMHG | BODY MASS INDEX: 31.35 KG/M2

## 2023-05-11 DIAGNOSIS — L30.9 DERMATITIS: Primary | ICD-10-CM

## 2023-05-11 DIAGNOSIS — Z34.82 ENCOUNTER FOR SUPERVISION OF OTHER NORMAL PREGNANCY IN SECOND TRIMESTER: Primary | ICD-10-CM

## 2023-05-11 DIAGNOSIS — Z23 NEED FOR VACCINATION: ICD-10-CM

## 2023-05-11 DIAGNOSIS — R07.81 RIB PAIN ON LEFT SIDE: ICD-10-CM

## 2023-05-11 DIAGNOSIS — Z34.82 ENCOUNTER FOR SUPERVISION OF OTHER NORMAL PREGNANCY IN SECOND TRIMESTER: ICD-10-CM

## 2023-05-11 LAB
ERYTHROCYTE [DISTWIDTH] IN BLOOD BY AUTOMATED COUNT: 12.7 % (ref 10–15)
GLUCOSE 1H P 50 G GLC PO SERPL-MCNC: 117 MG/DL (ref 70–129)
HCT VFR BLD AUTO: 32.7 % (ref 35–47)
HGB BLD-MCNC: 11 G/DL (ref 11.7–15.7)
MCH RBC QN AUTO: 30.9 PG (ref 26.5–33)
MCHC RBC AUTO-ENTMCNC: 33.6 G/DL (ref 31.5–36.5)
MCV RBC AUTO: 92 FL (ref 78–100)
PLATELET # BLD AUTO: 254 10E3/UL (ref 150–450)
RBC # BLD AUTO: 3.56 10E6/UL (ref 3.8–5.2)
WBC # BLD AUTO: 10.4 10E3/UL (ref 4–11)

## 2023-05-11 PROCEDURE — 85027 COMPLETE CBC AUTOMATED: CPT | Mod: ZL

## 2023-05-11 PROCEDURE — 86780 TREPONEMA PALLIDUM: CPT | Mod: ZL

## 2023-05-11 PROCEDURE — 99207 PR COMPLICATED OB VISIT: CPT | Performed by: OBSTETRICS & GYNECOLOGY

## 2023-05-11 PROCEDURE — 90715 TDAP VACCINE 7 YRS/> IM: CPT

## 2023-05-11 PROCEDURE — 36415 COLL VENOUS BLD VENIPUNCTURE: CPT | Mod: ZL

## 2023-05-11 PROCEDURE — 99213 OFFICE O/P EST LOW 20 MIN: CPT | Performed by: NURSE PRACTITIONER

## 2023-05-11 PROCEDURE — 90471 IMMUNIZATION ADMIN: CPT

## 2023-05-11 PROCEDURE — G0463 HOSPITAL OUTPT CLINIC VISIT: HCPCS | Mod: 25

## 2023-05-11 PROCEDURE — 82950 GLUCOSE TEST: CPT | Mod: ZL

## 2023-05-11 PROCEDURE — G0463 HOSPITAL OUTPT CLINIC VISIT: HCPCS

## 2023-05-11 PROCEDURE — G0463 HOSPITAL OUTPT CLINIC VISIT: HCPCS | Mod: 27

## 2023-05-11 RX ORDER — TRIAMCINOLONE ACETONIDE 1 MG/G
CREAM TOPICAL 3 TIMES DAILY PRN
Qty: 80 G | Refills: 1 | Status: SHIPPED | OUTPATIENT
Start: 2023-05-11 | End: 2023-08-17

## 2023-05-11 RX ORDER — HYDROCORTISONE 25 MG/G
OINTMENT TOPICAL 2 TIMES DAILY
Qty: 30 G | Refills: 1 | Status: SHIPPED | OUTPATIENT
Start: 2023-05-11 | End: 2023-08-17

## 2023-05-11 ASSESSMENT — ASTHMA QUESTIONNAIRES
ACT_TOTALSCORE: 16
QUESTION_4 LAST FOUR WEEKS HOW OFTEN HAVE YOU USED YOUR RESCUE INHALER OR NEBULIZER MEDICATION (SUCH AS ALBUTEROL): TWO OR THREE TIMES PER WEEK
QUESTION_3 LAST FOUR WEEKS HOW OFTEN DID YOUR ASTHMA SYMPTOMS (WHEEZING, COUGHING, SHORTNESS OF BREATH, CHEST TIGHTNESS OR PAIN) WAKE YOU UP AT NIGHT OR EARLIER THAN USUAL IN THE MORNING: NOT AT ALL
ACT_TOTALSCORE: 16
QUESTION_1 LAST FOUR WEEKS HOW MUCH OF THE TIME DID YOUR ASTHMA KEEP YOU FROM GETTING AS MUCH DONE AT WORK, SCHOOL OR AT HOME: A LITTLE OF THE TIME
QUESTION_2 LAST FOUR WEEKS HOW OFTEN HAVE YOU HAD SHORTNESS OF BREATH: MORE THAN ONCE A DAY
QUESTION_5 LAST FOUR WEEKS HOW WOULD YOU RATE YOUR ASTHMA CONTROL: SOMEWHAT CONTROLLED

## 2023-05-11 ASSESSMENT — PAIN SCALES - GENERAL
PAINLEVEL: SEVERE PAIN (7)
PAINLEVEL: MODERATE PAIN (5)

## 2023-05-11 ASSESSMENT — PATIENT HEALTH QUESTIONNAIRE - PHQ9: SUM OF ALL RESPONSES TO PHQ QUESTIONS 1-9: 18

## 2023-05-11 NOTE — PROGRESS NOTES
"  Assessment & Plan     1. Dermatitis  - triamcinolone (KENALOG) 0.1 % external cream; Apply topically 3 times daily as needed for irritation  Dispense: 80 g; Refill: 1    2. Rib pain on left side  Ice, tylenol as needed       BMI:   Estimated body mass index is 31.35 kg/m  as calculated from the following:    Height as of this encounter: 1.6 m (5' 3\").    Weight as of this encounter: 80.3 kg (177 lb).     Follow-up next week.      Tiara Hidalgo NP  [facility]    Darnell Weaver is a 23 year old, presenting for the following health issues:  Derm Problem          HPI     Rash  Onset/Duration: 3 days  Description  Location: left side of upper abdomen  Character: blotchy, painful, burning, red  Itching: mild  Intensity:  mild  Progression of Symptoms:  same  Accompanying signs and symptoms:   Fever: No  Body aches or joint pain: No  Sore throat symptoms: No  Recent cold symptoms: No  History:           Previous episodes of similar rash: None  New exposures:  None  Recent travel: No  Exposure to similar rash: No  Precipitating or alleviating factors: none  Therapies tried and outcome: witch hazel          Review of Systems   Constitutional, HEENT, cardiovascular, pulmonary, gi and gu systems are negative, except as otherwise noted.      Objective    /66   Pulse 87   Temp 97.6  F (36.4  C) (Tympanic)   Resp 18   Ht 1.6 m (5' 3\")   Wt 80.3 kg (177 lb)   LMP 11/01/2022   SpO2 100%   BMI 31.35 kg/m    Body mass index is 31.35 kg/m .  Physical Exam   GENERAL: healthy, alert and no distress  MS: tenderness of ribs on left side with palpitation.    SKIN: erythematous macular patch  PSYCH: mentation appears normal, affect normal/bright                    "

## 2023-05-11 NOTE — PATIENT INSTRUCTIONS
"  Assessment & Plan     1. Dermatitis  - triamcinolone (KENALOG) 0.1 % external cream; Apply topically 3 times daily as needed for irritation  Dispense: 80 g; Refill: 1    2. Rib pain on left side  Ice, tylenol as needed       BMI:   Estimated body mass index is 31.35 kg/m  as calculated from the following:    Height as of this encounter: 1.6 m (5' 3\").    Weight as of this encounter: 80.3 kg (177 lb).     Follow-up next week.      Tiara Hidalgo, NP  [facility]  "

## 2023-05-11 NOTE — PROGRESS NOTES
SUBJECTIVE  Meg Diallo is a 23 year old  with Patient's last menstrual period was 2022.. Estimated Date of Delivery: Aug 8, 2023. Gestational age is 27w2d. She presents for follow up OB visit.    She feels good fetal movement. She denies leaking of fluid, vaginal bleeding, cramping or pelvic pressure. She denies abnormal vaginal discharge, difficulty urinating, fever or chills. No headache, vision changes, lower extremity swelling, upper abdominal pain, chest pain, or shortness of breath.  She denies depression symptoms.  She denies asthma symptoms, wheezing, or difficulty breathing.  She has not used her inhaler in 5 months.  She complains of a rash on her left side just lateral to her breast.  This is itchy and very uncomfortable.    OBJECTIVE  /66   Wt 80.3 kg (177 lb)   LMP 2022   BMI 31.35 kg/m      General:  Well-developed well-nourished gravid female in no apparent distress. Alert and oriented x3.  Chest: The patient has an erythematous rash on the left just lateral to the breast. Mildly tender to palpation.  No discharge or drainage.  Abdomen: Soft, gravid, non tender, positive bowel sounds. Fundal height at 27 cm. Fetal heart tones auscultated at 142.  Cervix: deferred  Extremities:  No clubbing, cyanosis, or edema. Nontender bilaterally.    DIAGNOSTICS  2023 glucose screen, CBC, Treponema pending    ASSESSMENT / PLAN  1 Intrauterine pregnancy at 27w2d.  2 History of asthma  3 History of depression, anxiety, and bipolar disorder    - Problem list reviewed and updated.  - Pregnancy weight gain has been 9.587 kg (21 lb 2.2 oz) to date, which is adequate.   - I discussed screening for gestational diabetes, syphilis and anemia. 1 hour Glucose screen, Treponema and second trimester CBC ordered.  - I discussed the recommendation for Tdap in pregnancy. The patient agrees to vaccination. Adacel is ordered.  - I discussed use of hydrocortisone ointment for the patient's lateral  skin rash.  - I recommend the patient see her primary provider for further evaluation and management of rash.  - I discussed use of albuterol MDI in pregnancy.  - I recommend the patient see her primary provider for asthma action plan in pregnancy.  - Asthma precautions in pregnancy are reviewed with the patient.  - I reviewed routine obstetrical precautions, recommendations and instructions with the patient including fetal movement and kick count instructions.  - I reviewed depression precautions.  - I reviewed pre-eclampsia precautions with the patient with instructions for the patient to come in for persistent headache unrelieved with Tylenol, blurry vision, right upper quadrant pain, shortness of breath, or significant persistent edema.  - I reviewed  labor precautions with the patient with instructions for the patient to come in for decreased fetal movement, suspected rupture of membranes, regular contraction pattern, vaginal bleeding or any other concerning symptoms.  - Follow up in 2 weeks.    Jacobo Mcknight MD  Obstetrics and Gynecology

## 2023-05-12 LAB — T PALLIDUM AB SER QL: NONREACTIVE

## 2023-06-01 ENCOUNTER — PRENATAL OFFICE VISIT (OUTPATIENT)
Dept: OBGYN | Facility: OTHER | Age: 23
End: 2023-06-01
Attending: OBSTETRICS & GYNECOLOGY
Payer: COMMERCIAL

## 2023-06-01 VITALS — DIASTOLIC BLOOD PRESSURE: 64 MMHG | WEIGHT: 179 LBS | BODY MASS INDEX: 31.71 KG/M2 | SYSTOLIC BLOOD PRESSURE: 104 MMHG

## 2023-06-01 DIAGNOSIS — Z34.83 ENCOUNTER FOR SUPERVISION OF OTHER NORMAL PREGNANCY IN THIRD TRIMESTER: Primary | ICD-10-CM

## 2023-06-01 PROBLEM — Z34.93 ENCOUNTER FOR SUPERVISION OF NORMAL PREGNANCY IN THIRD TRIMESTER: Status: ACTIVE | Noted: 2023-01-05

## 2023-06-01 PROCEDURE — 99207 PR PRENATAL VISIT: CPT | Performed by: OBSTETRICS & GYNECOLOGY

## 2023-06-01 PROCEDURE — G0463 HOSPITAL OUTPT CLINIC VISIT: HCPCS

## 2023-06-01 RX ORDER — PRENATAL VIT,CAL 76/IRON/FOLIC 29 MG-1 MG
1 TABLET ORAL DAILY
Qty: 100 TABLET | Refills: 3 | Status: SHIPPED | OUTPATIENT
Start: 2023-06-01 | End: 2023-06-29

## 2023-06-01 ASSESSMENT — PAIN SCALES - GENERAL: PAINLEVEL: NO PAIN (0)

## 2023-06-01 NOTE — PROGRESS NOTES
SUBJECTIVE  Meg Diallo is a 23 year old  with Patient's last menstrual period was 2022.. Estimated Date of Delivery: Aug 8, 2023. Gestational age is 30w2d. She presents for follow up OB visit.    She is very uncomfortable at work and has low back pain and pelvic pressure which is worse with prolonged standing.  Patient is not sure if she is able to continue to work through pregnancy. She denies contractions, leaking fluid, or vaginal bleeding. She feels good fetal movement. She denies abnormal vaginal discharge, difficulty urinating, fever or chills. No headache, vision changes, lower extremity swelling, upper abdominal pain, chest pain, or shortness of breath.  She denies depression symptoms. She denies asthma symptoms, wheezing, or difficulty breathing.  She has not used her inhaler in 6 months    OBJECTIVE  /64   Wt 81.2 kg (179 lb)   LMP 2022   BMI 31.71 kg/m      General:  Well-developed well-nourished gravid female in no apparent distress. Alert and oriented x3.  Abdomen: Soft, gravid, non tender, positive bowel sounds. Fundal height at 30 cm. Fetal heart tones auscultated at 136.  Presentation noted to be cephalic by Leopold.  Cervix: Declined  Extremities:  No clubbing, cyanosis, or edema. Nontender bilaterally.    DIAGNOSTICS  2023     ASSESSMENT / PLAN  1 Intrauterine pregnancy at 30w2d.  2 History of asthma  3 History of depression, anxiety, and bipolar disorder    - Problem list reviewed and updated.  - Pregnancy weight gain has been 10.5 kg (23 lb 2.2 oz) to date, which is adequate.  - Prenatal labs reviewed.  -Symptomatic relief measures for low back pain in pregnancy are reviewed the patient.  The patient declines physical therapy consult.  I offered to write a work note to decrease hours and allow more rest at work.  I discussed the option of using FMLA to be off work for remainder of pregnancy.  I encouraged the patient to discuss her discomfort with her  employer.  The patient will let me know her management decision.  - I discussed use of albuterol MDI in pregnancy.  - I recommend the patient see her primary provider for asthma action plan in pregnancy.  - Asthma precautions in pregnancy are reviewed with the patient.  - I reviewed routine obstetrical precautions, recommendations and instructions with the patient including fetal movement and kick count instructions.  - I reviewed depression precautions.  - I reviewed pre-eclampsia precautions with the patient with instructions for the patient to come in for persistent headache unrelieved with Tylenol, blurry vision, right upper quadrant pain, shortness of breath, or significant persistent edema.  - I reviewed  labor precautions with the patient with instructions for the patient to come in for decreased fetal movement, suspected rupture of membranes, regular contraction pattern, vaginal bleeding or any other concerning symptoms.  - Follow up in 2 weeks.    Jacobo Mcknight MD  Obstetrics and Gynecology

## 2023-06-15 ENCOUNTER — PRENATAL OFFICE VISIT (OUTPATIENT)
Dept: OBGYN | Facility: OTHER | Age: 23
End: 2023-06-15
Attending: OBSTETRICS & GYNECOLOGY
Payer: COMMERCIAL

## 2023-06-15 ENCOUNTER — TELEPHONE (OUTPATIENT)
Dept: OBGYN | Facility: OTHER | Age: 23
End: 2023-06-15

## 2023-06-15 VITALS
RESPIRATION RATE: 16 BRPM | HEART RATE: 87 BPM | BODY MASS INDEX: 32.03 KG/M2 | OXYGEN SATURATION: 99 % | WEIGHT: 180.8 LBS | SYSTOLIC BLOOD PRESSURE: 102 MMHG | DIASTOLIC BLOOD PRESSURE: 60 MMHG

## 2023-06-15 DIAGNOSIS — Z34.83 ENCOUNTER FOR SUPERVISION OF OTHER NORMAL PREGNANCY IN THIRD TRIMESTER: Primary | ICD-10-CM

## 2023-06-15 PROCEDURE — 99207 PR COMPLICATED OB VISIT: CPT | Performed by: OBSTETRICS & GYNECOLOGY

## 2023-06-15 PROCEDURE — G0463 HOSPITAL OUTPT CLINIC VISIT: HCPCS

## 2023-06-15 ASSESSMENT — PAIN SCALES - GENERAL: PAINLEVEL: NO PAIN (0)

## 2023-06-15 NOTE — PROGRESS NOTES
SUBJECTIVE  Meg Diallo is a 23 year old  with Patient's last menstrual period was 2022.. Estimated Date of Delivery: Aug 8, 2023. Gestational age is 32w2d. She presents for follow up OB visit.    She continues to be very uncomfortable at work and does not believe that she is able to continue working for remainder of pregnancy.  Patient with low back pain and pelvic pressure which is worse with prolonged standing at work.  Her symptoms improve when she is off work and at home.  The patient is requesting a note to be off work for the remainder of pregnancy. She feels good fetal movement. She denies leaking of fluid or vaginal bleeding. No cramping, contractions, or pelvic pressure. She denies abnormal vaginal discharge, difficulty urinating, fever or chills. No headache, vision changes, lower extremity swelling, upper abdominal pain, chest pain, or shortness of breath.  She denies depression symptoms.  She denies any asthma symptoms, wheezing, shortness of breath, difficulty breathing.  She has not used her inhaler in 6 months.    OBJECTIVE  /60 (BP Location: Left arm, Cuff Size: Adult Regular)   Pulse 87   Resp 16   Wt 82 kg (180 lb 12.8 oz)   LMP 2022   SpO2 99%   BMI 32.03 kg/m      General:  Well-developed well-nourished gravid female in no apparent distress. Alert and oriented x3.  Abdomen: Soft, gravid, non tender, positive bowel sounds. Fundal height at 30 cm. Fetal heart tones auscultated at 138.  Presentation noted to be cephalic by Leopold.  Cervix: deferred  Extremities:  No clubbing, cyanosis, or edema. Nontender bilaterally.    ASSESSMENT / PLAN  1 Intrauterine pregnancy at 32w2d.  2 History of asthma  3 History of depression, anxiety, and bipolar disorder  4 Size less than dates    - Problem list reviewed and updated.  - Pregnancy weight gain has been 11.3 kg (24 lb 15 oz) to date, which is adequate.  - OB growth ultrasound ordered.  Radiology will contact patient's  schedule.  - Symptomatic relief measures for low back pain in pregnancy are reviewed the patient.  The patient declines physical therapy consult.  I offered to write a work note to decrease hours and allow more rest at work.  The patient declines.  She desires to be off work for the remainder of pregnancy.  I discussed the option of using FMLA to be off work for remainder of pregnancy.  Letter written at patient request.  - I discussed use of albuterol MDI in pregnancy.  - I recommend the patient see her primary provider for asthma action plan in pregnancy.  - Asthma precautions in pregnancy are reviewed with the patient.  - I reviewed routine obstetrical precautions, recommendations and instructions with the patient including fetal movement and kick count instructions.  - I reviewed depression precautions.  - I reviewed pre-eclampsia precautions with the patient with instructions for the patient to come in for persistent headache unrelieved with Tylenol, blurry vision, right upper quadrant pain, shortness of breath, or significant persistent edema.  - I reviewed  labor precautions with the patient with instructions for the patient to come in for decreased fetal movement, suspected rupture of membranes, regular contraction pattern, vaginal bleeding or any other concerning symptoms.  - Follow up in 2 weeks.    Jacobo Mcknight MD  Obstetrics and Gynecology

## 2023-06-15 NOTE — LETTER
Lorraine 15, 2023      RE: Meg Diallo   [address] [place] [id number]      To whom it may concern:    Meg Diallo is under my professional care for her prenatal care. She is pregnant and her Estimated Date of Delivery: Aug 8, 2023. She is very uncomfortable in pregnancy and having difficulty at work. I recommend that she stop working and be at home bedrest for the remainder of her pregnancy.    If you have additional questions or concerns, please call the OB/GYN Department, [facility] at [phone].    Thank you for your help with this matter.      Sincerely,        Jacobo Mcknight MD    [facility]  [address] [place] [id number]  Phone: [phone]  Fax: [phone]

## 2023-06-22 ENCOUNTER — TRANSFERRED RECORDS (OUTPATIENT)
Dept: HEALTH INFORMATION MANAGEMENT | Facility: CLINIC | Age: 23
End: 2023-06-22

## 2023-06-29 ENCOUNTER — PRENATAL OFFICE VISIT (OUTPATIENT)
Dept: OBGYN | Facility: OTHER | Age: 23
End: 2023-06-29
Attending: OBSTETRICS & GYNECOLOGY
Payer: COMMERCIAL

## 2023-06-29 VITALS — SYSTOLIC BLOOD PRESSURE: 100 MMHG | WEIGHT: 184 LBS | BODY MASS INDEX: 32.59 KG/M2 | DIASTOLIC BLOOD PRESSURE: 68 MMHG

## 2023-06-29 DIAGNOSIS — Z34.83 ENCOUNTER FOR SUPERVISION OF OTHER NORMAL PREGNANCY IN THIRD TRIMESTER: Primary | ICD-10-CM

## 2023-06-29 PROCEDURE — G0463 HOSPITAL OUTPT CLINIC VISIT: HCPCS

## 2023-06-29 PROCEDURE — 99207 PR COMPLICATED OB VISIT: CPT | Performed by: OBSTETRICS & GYNECOLOGY

## 2023-06-29 ASSESSMENT — PAIN SCALES - GENERAL: PAINLEVEL: NO PAIN (0)

## 2023-06-29 NOTE — PROGRESS NOTES
SUBJECTIVE  Meg Diallo is a 23 year old  with Patient's last menstrual period was 2022.. Estimated Date of Delivery: Aug 8, 2023. Gestational age is 34w2d. She presents for follow up OB visit.    She is doing well.  She feels much better now that she is off work and at home. She feels good fetal movement. She denies leaking of fluid or vaginal bleeding. No cramping, contractions, or pelvic pressure. She denies abnormal vaginal discharge, difficulty urinating, fever or chills. No headache, vision changes, lower extremity swelling, upper abdominal pain, chest pain, or shortness of breath.  She denies depression symptoms.  No asthma symptoms, difficulty breathing, shortness of breath, or wheezing.  She has not used her inhaler in 6 months.    OBJECTIVE  /68   Wt 83.5 kg (184 lb)   LMP 2022   BMI 32.59 kg/m      General:  Well-developed well-nourished gravid female in no apparent distress. Alert and oriented x3.  Abdomen: Soft, gravid, non tender, positive bowel sounds. Fundal height at 32 cm. Fetal heart tones auscultated at 132.  Presentation noted to be cephalic by Leopold.  Cervix: deferred  Extremities:  No clubbing, cyanosis, or edema. Nontender bilaterally.    DIAGNOSTICS  2023 OB ultrasound: 33 3/7 wk, 2077 gm, 30%, cephalic presentation, posterior placenta, normal ERI, appropriate interval fetal growth.    ASSESSMENT / PLAN  1 Intrauterine pregnancy at 34w2d.  2 History of asthma  3 History of depression, anxiety, and bipolar disorder  4 Size less than dates    - Problem list reviewed and updated.  - Pregnancy weight gain has been 12.8 kg (28 lb 2.2 oz) to date, which is adequate.  - OB ultrasound reviewed which shows normal fluid level and appropriate interval fetal growth.  - I recommend the patient see her primary provider for asthma action plan in pregnancy.  - Asthma precautions in pregnancy are reviewed with the patient.  - I reviewed routine obstetrical precautions,  recommendations and instructions with the patient including fetal movement and kick count instructions.  - I reviewed depression precautions.  - I reviewed pre-eclampsia precautions with the patient with instructions for the patient to come in for persistent headache unrelieved with Tylenol, blurry vision, right upper quadrant pain, shortness of breath, or significant persistent edema.  - I reviewed  labor precautions with the patient with instructions for the patient to come in for decreased fetal movement, suspected rupture of membranes, regular contraction pattern, vaginal bleeding or any other concerning symptoms.  - Follow up in 2 weeks.    Jacobo Mcknight MD  Obstetrics and Gynecology
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
## 2023-07-13 ENCOUNTER — OFFICE VISIT (OUTPATIENT)
Dept: FAMILY MEDICINE | Facility: OTHER | Age: 23
End: 2023-07-13
Attending: NURSE PRACTITIONER
Payer: COMMERCIAL

## 2023-07-13 ENCOUNTER — PRENATAL OFFICE VISIT (OUTPATIENT)
Dept: OBGYN | Facility: OTHER | Age: 23
End: 2023-07-13
Attending: OBSTETRICS & GYNECOLOGY
Payer: COMMERCIAL

## 2023-07-13 VITALS
OXYGEN SATURATION: 100 % | RESPIRATION RATE: 16 BRPM | BODY MASS INDEX: 32.96 KG/M2 | HEART RATE: 91 BPM | DIASTOLIC BLOOD PRESSURE: 68 MMHG | SYSTOLIC BLOOD PRESSURE: 112 MMHG | HEIGHT: 63 IN | TEMPERATURE: 98.2 F | WEIGHT: 186 LBS

## 2023-07-13 VITALS — DIASTOLIC BLOOD PRESSURE: 68 MMHG | BODY MASS INDEX: 32.95 KG/M2 | SYSTOLIC BLOOD PRESSURE: 112 MMHG | WEIGHT: 186 LBS

## 2023-07-13 DIAGNOSIS — F31.4 BIPOLAR DISORDER, CURRENT EPISODE DEPRESSED, SEVERE, WITHOUT PSYCHOTIC FEATURES (H): Primary | ICD-10-CM

## 2023-07-13 DIAGNOSIS — J45.20 MILD INTERMITTENT ASTHMA WITHOUT COMPLICATION: ICD-10-CM

## 2023-07-13 DIAGNOSIS — Z34.83 ENCOUNTER FOR SUPERVISION OF OTHER NORMAL PREGNANCY IN THIRD TRIMESTER: Primary | ICD-10-CM

## 2023-07-13 DIAGNOSIS — F33.1 MAJOR DEPRESSIVE DISORDER, RECURRENT EPISODE, MODERATE (H): ICD-10-CM

## 2023-07-13 DIAGNOSIS — R06.2 WHEEZING: ICD-10-CM

## 2023-07-13 DIAGNOSIS — F41.1 ANXIETY STATE: ICD-10-CM

## 2023-07-13 PROCEDURE — 99207 PR PRENATAL VISIT: CPT | Performed by: OBSTETRICS & GYNECOLOGY

## 2023-07-13 PROCEDURE — 87086 URINE CULTURE/COLONY COUNT: CPT | Mod: ZL | Performed by: OBSTETRICS & GYNECOLOGY

## 2023-07-13 PROCEDURE — G0463 HOSPITAL OUTPT CLINIC VISIT: HCPCS | Mod: 25

## 2023-07-13 PROCEDURE — 99214 OFFICE O/P EST MOD 30 MIN: CPT | Performed by: NURSE PRACTITIONER

## 2023-07-13 PROCEDURE — 87653 STREP B DNA AMP PROBE: CPT | Mod: ZL | Performed by: OBSTETRICS & GYNECOLOGY

## 2023-07-13 PROCEDURE — G0463 HOSPITAL OUTPT CLINIC VISIT: HCPCS | Mod: 27

## 2023-07-13 RX ORDER — ALBUTEROL SULFATE 90 UG/1
2 AEROSOL, METERED RESPIRATORY (INHALATION) EVERY 6 HOURS PRN
Qty: 18 G | Refills: 3 | Status: SHIPPED | OUTPATIENT
Start: 2023-07-13 | End: 2024-06-21

## 2023-07-13 ASSESSMENT — ANXIETY QUESTIONNAIRES
2. NOT BEING ABLE TO STOP OR CONTROL WORRYING: NEARLY EVERY DAY
1. FEELING NERVOUS, ANXIOUS, OR ON EDGE: NEARLY EVERY DAY
IF YOU CHECKED OFF ANY PROBLEMS ON THIS QUESTIONNAIRE, HOW DIFFICULT HAVE THESE PROBLEMS MADE IT FOR YOU TO DO YOUR WORK, TAKE CARE OF THINGS AT HOME, OR GET ALONG WITH OTHER PEOPLE: VERY DIFFICULT
GAD7 TOTAL SCORE: 21
3. WORRYING TOO MUCH ABOUT DIFFERENT THINGS: NEARLY EVERY DAY
4. TROUBLE RELAXING: NEARLY EVERY DAY
6. BECOMING EASILY ANNOYED OR IRRITABLE: NEARLY EVERY DAY
7. FEELING AFRAID AS IF SOMETHING AWFUL MIGHT HAPPEN: NEARLY EVERY DAY
5. BEING SO RESTLESS THAT IT IS HARD TO SIT STILL: NEARLY EVERY DAY
GAD7 TOTAL SCORE: 21

## 2023-07-13 ASSESSMENT — PAIN SCALES - GENERAL
PAINLEVEL: NO PAIN (0)
PAINLEVEL: NO PAIN (0)

## 2023-07-13 ASSESSMENT — PATIENT HEALTH QUESTIONNAIRE - PHQ9
10. IF YOU CHECKED OFF ANY PROBLEMS, HOW DIFFICULT HAVE THESE PROBLEMS MADE IT FOR YOU TO DO YOUR WORK, TAKE CARE OF THINGS AT HOME, OR GET ALONG WITH OTHER PEOPLE: EXTREMELY DIFFICULT
SUM OF ALL RESPONSES TO PHQ QUESTIONS 1-9: 23
SUM OF ALL RESPONSES TO PHQ QUESTIONS 1-9: 23

## 2023-07-13 ASSESSMENT — ASTHMA QUESTIONNAIRES: ACT_TOTALSCORE: 15

## 2023-07-13 NOTE — LETTER
My Asthma Action Plan    Name: Meg Diallo   YOB: 2000  Date: 7/13/2023   My doctor: Tiara Hidalgo NP   My clinic: [facility]        My Rescue Medicine:   Albuterol inhaler (Proair/Ventolin/Proventil HFA)  2-4 puffs EVERY 4 HOURS as needed. Use a spacer if recommended by your provider.   My Asthma Severity:   Intermittent / Exercise Induced  Know your asthma triggers: smoke, strong odors and fumes, emotions, and seasonal   seasonal and stress          GREEN ZONE   Good Control  I feel good  No cough or wheeze  Can work, sleep and play without asthma symptoms       Take your asthma control medicine every day.     If exercise triggers your asthma, take your rescue medication  15 minutes before exercise or sports, and  During exercise if you have asthma symptoms  Spacer to use with inhaler: If you have a spacer, make sure to use it with your inhaler             YELLOW ZONE Getting Worse  I have ANY of these:  I do not feel good  Cough or wheeze  Chest feels tight  Wake up at night   Keep taking your Green Zone medications  Start taking your rescue medicine:  every 20 minutes for up to 1 hour. Then every 4 hours for 24-48 hours.  If you stay in the Yellow Zone for more than 12-24 hours, contact your doctor.  If you do not return to the Green Zone in 12-24 hours or you get worse, start taking your oral steroid medicine if prescribed by your provider.           RED ZONE Medical Alert - Get Help  I have ANY of these:  I feel awful  Medicine is not helping  Breathing getting harder  Trouble walking or talking  Nose opens wide to breathe       Take your rescue medicine NOW  If your provider has prescribed an oral steroid medicine, start taking it NOW  Call your doctor NOW  If you are still in the Red Zone after 20 minutes and you have not reached your doctor:  Take your rescue medicine again and  Call 911 or go to the emergency room right away    See your regular doctor  within 2 weeks of an Emergency Room or Urgent Care visit for follow-up treatment.          Annual Reminders:  Meet with Asthma Educator,  Flu Shot in the Fall, consider Pneumonia Vaccination for patients with asthma (aged 19 and older).    Pharmacy:    [facility] [address] [facility] [id number] [facility] [address]  [facility] [phone] [facility] [address] [facility]    Electronically signed by Tiara Hidalgo NP   Date: 07/13/23                    Asthma Triggers  How To Control Things That Make Your Asthma Worse    Triggers are things that make your asthma worse.  Look at the list below to help you find your triggers and   what you can do about them. You can help prevent asthma flare-ups by staying away from your triggers.      Trigger                                                          What you can do   Cigarette Smoke  Tobacco smoke can make asthma worse. Do not allow smoking in your home, car or around you.  Be sure no one smokes at a child s day care or school.  If you smoke, ask your health care provider for ways to help you quit.  Ask family members to quit too.  Ask your health care provider for a referral to Quit Plan to help you quit smoking, or call 5-463-242-PLAN.     Colds, Flu, Bronchitis  These are common triggers of asthma. Wash your hands often.  Don t touch your eyes, nose or mouth.  Get a flu shot every year.     Dust Mites  These are tiny bugs that live in cloth or carpet. They are too small to see. Wash sheets and blankets in hot water every week.   Encase pillows and mattress in dust mite proof covers.  Avoid having carpet if you can. If you have carpet, vacuum weekly.   Use a dust mask and HEPA vacuum.   Pollen and Outdoor Mold  Some people are allergic to trees, grass, or weed pollen, or molds. Try to keep your windows closed.  Limit time out doors when pollen count is high.   Ask you health care provider about taking medicine during allergy season.     Animal Dander  Some  people are allergic to skin flakes, urine or saliva from pets with fur or feathers. Keep pets with fur or feathers out of your home.    If you can t keep the pet outdoors, then keep the pet out of your bedroom.  Keep the bedroom door closed.  Keep pets off cloth furniture and away from stuffed toys.     Mice, Rats, and Cockroaches  Some people are allergic to the waste from these pests.   Cover food and garbage.  Clean up spills and food crumbs.  Store grease in the refrigerator.   Keep food out of the bedroom.   Indoor Mold  This can be a trigger if your home has high moisture. Fix leaking faucets, pipes, or other sources of water.   Clean moldy surfaces.  Dehumidify basement if it is damp and smelly.   Smoke, Strong Odors, and Sprays  These can reduce air quality. Stay away from strong odors and sprays, such as perfume, powder, hair spray, paints, smoke incense, paint, cleaning products, candles and new carpet.   Exercise or Sports  Some people with asthma have this trigger. Be active!  Ask your doctor about taking medicine before sports or exercise to prevent symptoms.    Warm up for 5-10 minutes before and after sports or exercise.     Other Triggers of Asthma  Cold air:  Cover your nose and mouth with a scarf.  Sometimes laughing or crying can be a trigger.  Some medicines and food can trigger asthma.

## 2023-07-13 NOTE — PROGRESS NOTES
SUBJECTIVE  Meg Diallo is a 23 year old  with Patient's last menstrual period was 2022.. Estimated Date of Delivery: Aug 8, 2023. Gestational age is 36w2d. She presents for follow up OB visit.    She has no complaints. She feels good fetal movement. No contractions. She denies leaking of fluid or vaginal bleeding. She denies abnormal vaginal discharge, difficulty urinating, fever or chills. No headache, vision changes, lower extremity swelling, upper abdominal pain, chest pain, or shortness of breath.  She denies depression symptoms.  She denies difficulty breathing, shortness of breath, wheezing, or asthma symptoms.  She has not used her inhaler in 7 months.    OBJECTIVE  /68   Wt 84.4 kg (186 lb)   LMP 2022   BMI 32.95 kg/m      General:  Well-developed well-nourished gravid female in no apparent distress. Alert and oriented x3.  Abdomen: Soft, gravid, non tender, positive bowel sounds. Fundal height at 34 cm. Fetal heart tones auscultated at 128.  Presentation noted to be cephalic by Leopold.  Cervix: Declined  Extremities:  No clubbing, cyanosis, or edema. Nontender bilaterally.    Chaperone: Yael Reyes LPN    DIAGNOSTICS  2023 GBS pending  2023 UCx pending    2023 OB ultrasound: 33 3/7 wk, 2077 gm, 30%, cephalic presentation, posterior placenta, normal ERI, appropriate interval fetal growth.    ASSESSMENT / PLAN  1 Intrauterine pregnancy at 36w2d.  2 History of asthma  3 History of depression, anxiety, and bipolar disorder  4 Size less than dates    - Problem list reviewed and updated.  - Pregnancy weight gain has been 13.7 kg (30 lb 2.2 oz) to date, which is adequate.  - GBS culture obtained today.  - Plan cervix check at next visit.  - Repeat OB ultrasound for growth ordered.  Radiology will contact patient to schedule.  - I recommend the patient see her primary provider for asthma action plan in pregnancy.  - Asthma precautions in pregnancy are reviewed with  the patient.  - I reviewed routine obstetrical precautions, recommendations and instructions with the patient including fetal movement and kick count instructions.  - I reviewed depression precautions.  - I reviewed pre-eclampsia precautions with the patient with instructions for the patient to come in for persistent headache unrelieved with Tylenol, blurry vision, right upper quadrant pain, shortness of breath, or significant persistent edema.  - I reviewed  labor precautions with the patient with instructions for the patient to come in for decreased fetal movement, suspected rupture of membranes, regular contraction pattern, vaginal bleeding or any other concerning symptoms.  - Follow up in 1 week.    Jacobo Mcknight MD  Obstetrics and Gynecology

## 2023-07-13 NOTE — LETTER
My Asthma Action Plan    Name: Meg Diallo   YOB: 2000  Date: 7/13/2023   My doctor: Tiara Hidalgo NP   My clinic: [facility]        My Rescue Medicine:   Albuterol inhaler (Proair/Ventolin/Proventil HFA)  2-4 puffs EVERY 4 HOURS as needed. Use a spacer if recommended by your provider.   My Asthma Severity:   Intermittent / Exercise Induced  Know your asthma triggers: smoke and mold  seasonal and stress          GREEN ZONE   Good Control    I feel good    No cough or wheeze    Can work, sleep and play without asthma symptoms       Take your asthma control medicine every day.     1. If exercise triggers your asthma, take your rescue medication    15 minutes before exercise or sports, and    During exercise if you have asthma symptoms  2. Spacer to use with inhaler: If you have a spacer, make sure to use it with your inhaler             YELLOW ZONE Getting Worse  I have ANY of these:    I do not feel good    Cough or wheeze    Chest feels tight    Wake up at night   1. Keep taking your Green Zone medications  2. Start taking your rescue medicine:    every 20 minutes for up to 1 hour. Then every 4 hours for 24-48 hours.  3. If you stay in the Yellow Zone for more than 12-24 hours, contact your doctor.  4. If you do not return to the Green Zone in 12-24 hours or you get worse, start taking your oral steroid medicine if prescribed by your provider.           RED ZONE Medical Alert - Get Help  I have ANY of these:    I feel awful    Medicine is not helping    Breathing getting harder    Trouble walking or talking    Nose opens wide to breathe       1. Take your rescue medicine NOW  2. If your provider has prescribed an oral steroid medicine, start taking it NOW  3. Call your doctor NOW  4. If you are still in the Red Zone after 20 minutes and you have not reached your doctor:    Take your rescue medicine again and    Call 911 or go to the emergency room right away    See  your regular doctor within 2 weeks of an Emergency Room or Urgent Care visit for follow-up treatment.          Annual Reminders:  Meet with Asthma Educator,  Flu Shot in the Fall, consider Pneumonia Vaccination for patients with asthma (aged 19 and older).    Pharmacy:    [id number] - [place], [place] - [address] [address] [facility] [address]  [facility] - [facility] [address] [facility]    Electronically signed by Tiara Hidalgo NP   Date: 07/13/23                    Asthma Triggers  How To Control Things That Make Your Asthma Worse    Triggers are things that make your asthma worse.  Look at the list below to help you find your triggers and   what you can do about them. You can help prevent asthma flare-ups by staying away from your triggers.      Trigger                                                          What you can do   Cigarette Smoke  Tobacco smoke can make asthma worse. Do not allow smoking in your home, car or around you.  Be sure no one smokes at a child s day care or school.  If you smoke, ask your health care provider for ways to help you quit.  Ask family members to quit too.  Ask your health care provider for a referral to Quit Plan to help you quit smoking, or call 5-520-747-PLAN.     Colds, Flu, Bronchitis  These are common triggers of asthma. Wash your hands often.  Don t touch your eyes, nose or mouth.  Get a flu shot every year.     Dust Mites  These are tiny bugs that live in cloth or carpet. They are too small to see. Wash sheets and blankets in hot water every week.   Encase pillows and mattress in dust mite proof covers.  Avoid having carpet if you can. If you have carpet, vacuum weekly.   Use a dust mask and HEPA vacuum.   Pollen and Outdoor Mold  Some people are allergic to trees, grass, or weed pollen, or molds. Try to keep your windows closed.  Limit time out doors when pollen count is high.   Ask you health care provider about taking medicine during allergy season.      Animal Dander  Some people are allergic to skin flakes, urine or saliva from pets with fur or feathers. Keep pets with fur or feathers out of your home.    If you can t keep the pet outdoors, then keep the pet out of your bedroom.  Keep the bedroom door closed.  Keep pets off cloth furniture and away from stuffed toys.     Mice, Rats, and Cockroaches  Some people are allergic to the waste from these pests.   Cover food and garbage.  Clean up spills and food crumbs.  Store grease in the refrigerator.   Keep food out of the bedroom.   Indoor Mold  This can be a trigger if your home has high moisture. Fix leaking faucets, pipes, or other sources of water.   Clean moldy surfaces.  Dehumidify basement if it is damp and smelly.   Smoke, Strong Odors, and Sprays  These can reduce air quality. Stay away from strong odors and sprays, such as perfume, powder, hair spray, paints, smoke incense, paint, cleaning products, candles and new carpet.   Exercise or Sports  Some people with asthma have this trigger. Be active!  Ask your doctor about taking medicine before sports or exercise to prevent symptoms.    Warm up for 5-10 minutes before and after sports or exercise.     Other Triggers of Asthma  Cold air:  Cover your nose and mouth with a scarf.  Sometimes laughing or crying can be a trigger.  Some medicines and food can trigger asthma.

## 2023-07-14 LAB — GP B STREP DNA SPEC QL NAA+PROBE: NEGATIVE

## 2023-07-15 LAB — BACTERIA UR CULT: NORMAL

## 2023-07-18 NOTE — TELEPHONE ENCOUNTER
Called patient and updated on below.She verbalized understanding.       Scheduled lab for next Thursday 12.16.2021 and recheck date of 12.30.2021. Are these dates OK?    She is wondering if she is still at a decrease chance of pregnancy? She states she is nervous.    Will need orders.Not sure if urine or blood.    Jessica Encarnacion RN       Principal Discharge DX:	Abscess   1

## 2023-07-20 ENCOUNTER — PRENATAL OFFICE VISIT (OUTPATIENT)
Dept: OBGYN | Facility: OTHER | Age: 23
End: 2023-07-20
Attending: OBSTETRICS & GYNECOLOGY
Payer: COMMERCIAL

## 2023-07-20 VITALS — DIASTOLIC BLOOD PRESSURE: 72 MMHG | WEIGHT: 189 LBS | SYSTOLIC BLOOD PRESSURE: 118 MMHG | BODY MASS INDEX: 33.48 KG/M2

## 2023-07-20 DIAGNOSIS — Z34.83 ENCOUNTER FOR SUPERVISION OF OTHER NORMAL PREGNANCY IN THIRD TRIMESTER: Primary | ICD-10-CM

## 2023-07-20 PROCEDURE — 99207 PR COMPLICATED OB VISIT: CPT | Performed by: OBSTETRICS & GYNECOLOGY

## 2023-07-20 PROCEDURE — G0463 HOSPITAL OUTPT CLINIC VISIT: HCPCS

## 2023-07-20 ASSESSMENT — PAIN SCALES - GENERAL: PAINLEVEL: NO PAIN (0)

## 2023-07-20 NOTE — PROGRESS NOTES
SUBJECTIVE  Meg Diallo is a 23 year old  with Patient's last menstrual period was 2022.. Estimated Date of Delivery: Aug 8, 2023. Gestational age is 37w2d. She presents for follow up OB visit.    She denies contractions, leaking of fluid or vaginal bleeding. She feels good fetal movement. No abnormal vaginal discharge, difficulty urinating, fever or chills. No headache, vision changes, lower extremity swelling, upper abdominal pain, chest pain, or shortness of breath.  She has some depression and anxiety symptoms but denies suicidal thoughts.  She denies any asthma symptoms, wheezing, or difficulty breathing.  She has not used her inhaler in 7 months.  She declines cervical check as her daughter is with her at this appointment.    OBJECTIVE  /72   Wt 85.7 kg (189 lb)   LMP 2022   BMI 33.48 kg/m      General:  Well-developed well-nourished gravid female in no apparent distress. Alert and oriented x3.  Abdomen: Soft, gravid, non tender, positive bowel sounds. Fundal height at 35 cm. Fetal heart tones auscultated at 134.  Presentation noted to be cephalic by Leopold.  Cervix: Declined  Extremities:  No clubbing, cyanosis, or edema. Nontender bilaterally.    DIAGNOSTICS  2023 GBS Negative  2023 UCx Negative    2023 OB ultrasound: 33 3/7 wk, 2077 gm, 30%, cephalic presentation, posterior placenta, normal ERI, appropriate interval fetal growth.    ASSESSMENT / PLAN  1 Intrauterine pregnancy at 37w2d.  2 History of asthma  3 History of depression, anxiety, and bipolar disorder  4 Size less than dates    - Problem list reviewed and updated.  - Pregnancy weight gain has been 15 kg (33 lb 2.2 oz) to date, which is adequate.  - GBS culture result reviewed.  - The patient plans to breast feed and she is undecided for contraception after delivery.  - The patient again declined cervical check as her daughter is with her during the visit.  I recommend cervical check at next  visit.  - OB growth ultrasound scheduled 07/21/2023.  - I recommend the patient see her primary provider for asthma action plan in pregnancy.  - Asthma precautions in pregnancy are reviewed with the patient.  - I reviewed routine obstetrical precautions, recommendations and instructions with the patient including fetal movement and kick count instructions.  - I recommend the patient see her mental health provider for depression, anxiety, bipolar follow-up.  - I reviewed depression precautions.  - I reviewed pre-eclampsia precautions with the patient with instructions for the patient to come in for persistent headache unrelieved with Tylenol, blurry vision, right upper quadrant pain, shortness of breath, or significant persistent edema.  - I reviewed labor precautions with the patient with instructions for the patient to come in for decreased fetal movement, suspected rupture of membranes, regular contraction pattern, vaginal bleeding or any other concerning symptoms.  - Follow up in 1 week.    Jacobo Mcknight MD  Obstetrics and Gynecology

## 2023-07-25 NOTE — PROGRESS NOTES
"  Assessment & Plan     1. Bipolar disorder, current episode depressed, severe, without psychotic features (H)  She will work with her new medication provider at [place].  She is not interested in starting anything as she is due with her second child in the next 2 weeks.      2. Major depressive disorder, recurrent episode, moderate (H)  As above    3. Anxiety state  As above    4. Mild intermittent asthma without complication  Stable, continue albuterol as needed    She does attend therapy sessions every week to every other week.         BMI:   Estimated body mass index is 33.6 kg/m  as calculated from the following:    Height as of 7/13/23: 1.6 m (5' 3\").    Weight as of this encounter: 86 kg (189 lb 11.2 oz).       Follow-up in 1 month or as needed    Tiara Hidalgo NP  [facility]   Meg is a 23 year old, presenting for the following health issues:  Follow Up    HPI     Depression and Anxiety Follow-Up  How are you doing with your depression since your last visit? No change  How are you doing with your anxiety since your last visit?  Worsened close to due date - overwhelming feeling  Are you having other symptoms that might be associated with depression or anxiety? Yes:  fatigue, lack of motivation, fidgety  Have you had a significant life event? Financial Concerns, moving into a new apartment in September.  Medical insurance issues.  She is working with her [employer] worker and social work to get her medical insurance straightened out.    Do you have any concerns with your use of alcohol or other drugs? No  Is following with [facility].  Is in the process of obtaining a new psychiatrist as hers left the     Social History     Tobacco Use    Smoking status: Never    Smokeless tobacco: Never   Vaping Use    Vaping Use: Never used   Substance Use Topics    Alcohol use: No    Drug use: No         5/11/2023    11:36 AM 7/13/2023    10:16 AM 7/31/2023     " 2:18 PM   PHQ   PHQ-9 Total Score 18 23 26   Q9: Thoughts of better off dead/self-harm past 2 weeks Not at all Several days More than half the days   F/U: Thoughts of suicide or self-harm  Yes Yes   F/U: Self harm-plan  Yes No   F/U: Self-harm action  No No   F/U: Safety concerns  No No         1/19/2023     2:16 PM 7/13/2023    10:16 AM 7/31/2023     2:18 PM   RUFINA-7 SCORE   Total Score  21 (severe anxiety) 21 (severe anxiety)   Total Score 17 21 21         Suicide Assessment Five-step Evaluation and Treatment (SAFE-T)    Asthma Follow-Up    Was ACT completed today?  Yes        7/31/2023     2:19 PM   ACT Total Scores   ACT TOTAL SCORE (Goal Greater than or Equal to 20) 14   In the past 12 months, how many times did you visit the emergency room for your asthma without being admitted to the hospital? 0   In the past 12 months, how many times were you hospitalized overnight because of your asthma? 0       How many days per week do you miss taking your asthma controller medication?  I do not have an asthma controller medication  Please describe any recent triggers for your asthma: smoke, strong odors and fumes, and cold air  Have you had any Emergency Room Visits, Urgent Care Visits, or Hospital Admissions since your last office visit?  No          Review of Systems   Constitutional, HEENT, cardiovascular, pulmonary, gi and gu systems are negative, except as otherwise noted.      Objective  /60 (BP Location: Right arm, Patient Position: Sitting, Cuff Size: Adult Regular)   Pulse 84   Temp 98  F (36.7  C) (Tympanic)   Wt 86 kg (189 lb 11.2 oz)   LMP 11/01/2022   SpO2 99%   BMI 33.60 kg/m    Body mass index is 33.6 kg/m .  Physical Exam   GENERAL: healthy, alert and no distress  RESP: lungs clear to auscultation - no rales, rhonchi or wheezes  CV: regular rate and rhythm, normal S1 S2, no S3 or S4, no murmur, click or rub, no peripheral edema and peripheral pulses strong  MS: no gross musculoskeletal defects  noted, no edema  PSYCH: mentation appears normal, affect normal/bright                    Answers submitted by the patient for this visit:  Patient Health Questionnaire (Submitted on 7/31/2023)  If you checked off any problems, how difficult have these problems made it for you to do your work, take care of things at home, or get along with other people?: Extremely difficult  PHQ9 TOTAL SCORE: 26  RUFINA-7 (Submitted on 7/31/2023)  RUFINA 7 TOTAL SCORE: 21

## 2023-07-27 ENCOUNTER — PRENATAL OFFICE VISIT (OUTPATIENT)
Dept: OBGYN | Facility: OTHER | Age: 23
End: 2023-07-27
Attending: OBSTETRICS & GYNECOLOGY
Payer: COMMERCIAL

## 2023-07-27 VITALS — SYSTOLIC BLOOD PRESSURE: 110 MMHG | WEIGHT: 189 LBS | DIASTOLIC BLOOD PRESSURE: 68 MMHG | BODY MASS INDEX: 33.48 KG/M2

## 2023-07-27 DIAGNOSIS — Z34.83 ENCOUNTER FOR SUPERVISION OF OTHER NORMAL PREGNANCY IN THIRD TRIMESTER: Primary | ICD-10-CM

## 2023-07-27 PROCEDURE — 99207 PR PRENATAL VISIT: CPT | Performed by: OBSTETRICS & GYNECOLOGY

## 2023-07-27 PROCEDURE — G0463 HOSPITAL OUTPT CLINIC VISIT: HCPCS

## 2023-07-27 ASSESSMENT — PAIN SCALES - GENERAL: PAINLEVEL: SEVERE PAIN (6)

## 2023-07-27 NOTE — PROGRESS NOTES
SUBJECTIVE  Meg Diallo is a 23 year old  with Patient's last menstrual period was 2022.. Estimated Date of Delivery: Aug 8, 2023. Gestational age is 38w2d. She presents for follow up OB visit.    She has irregular contractions which resolve with rest. No leaking of fluid or vaginal bleeding. She feels good fetal movement. She denies abnormal vaginal discharge, difficulty urinating, fever or chills. No headache, vision changes, lower extremity swelling, upper abdominal pain, chest pain, or shortness of breath.  She denies depression symptoms.  No difficulty breathing, wheezing, or asthma symptoms.  She has not used her inhaler in 7 months.    OBJECTIVE  /68   Wt 85.7 kg (189 lb)   LMP 2022   BMI 33.48 kg/m      General:  Well-developed well-nourished gravid female in no apparent distress. Alert and oriented x3.  Abdomen: Soft, gravid, non tender, positive bowel sounds. Fundal height at 35 cm. Fetal heart tones auscultated at 138.  Presentation noted to be cephalic by Leopold.  Cervix: Finger tip, 50 % effacement, -3 station, vertex presentation  Extremities:  No clubbing, cyanosis, or edema. Nontender bilaterally.    Chaperone: Yael Reyes LPN    DIAGNOSTICS  2023 GBS Negative     2023 OB ultrasound: 33 3/7 wk, 2077 gm, 30%, cephalic presentation, posterior placenta, normal ERI, appropriate interval fetal growth.     2023 OB ultrasound: 36 6/7 wk, 3014 gm, 40%, cephalic presentation, posterior placenta, normal ERI, appropriate interval fetal growth.     ASSESSMENT / PLAN  1 Intrauterine pregnancy at 38w2d.  2 History of asthma  3 History of depression, anxiety, and bipolar disorder  4 Size less than dates    - Problem list reviewed and updated.  - Pregnancy weight gain has been 15 kg (33 lb 2.2 oz) to date, which is adequate.  - GBS culture result reviewed.  - OB ultrasound reviewed showing appropriate interval fetal growth and normal fluid level.  - The patient plans  to breast feed and she is undecided for contraception after delivery.  - I recommend the patient see her primary provider for asthma action plan in pregnancy.  - Asthma precautions in pregnancy are reviewed with the patient.  - I reviewed routine obstetrical precautions, recommendations and instructions with the patient including fetal movement and kick count instructions.  - I reviewed depression precautions.  - I reviewed pre-eclampsia precautions with the patient with instructions for the patient to come in for persistent headache unrelieved with Tylenol, blurry vision, right upper quadrant pain, shortness of breath, or significant persistent edema.  - I reviewed labor precautions with the patient with instructions for the patient to come in for decreased fetal movement, suspected rupture of membranes, regular contraction pattern, vaginal bleeding or any other concerning symptoms.  - Follow up in 1 week.    Jacobo Mcknight MD  Obstetrics and Gynecology

## 2023-07-31 ENCOUNTER — OFFICE VISIT (OUTPATIENT)
Dept: FAMILY MEDICINE | Facility: OTHER | Age: 23
End: 2023-07-31
Attending: NURSE PRACTITIONER
Payer: COMMERCIAL

## 2023-07-31 VITALS
DIASTOLIC BLOOD PRESSURE: 60 MMHG | BODY MASS INDEX: 33.6 KG/M2 | WEIGHT: 189.7 LBS | HEART RATE: 84 BPM | SYSTOLIC BLOOD PRESSURE: 125 MMHG | TEMPERATURE: 98 F | OXYGEN SATURATION: 99 %

## 2023-07-31 DIAGNOSIS — F33.1 MAJOR DEPRESSIVE DISORDER, RECURRENT EPISODE, MODERATE (H): ICD-10-CM

## 2023-07-31 DIAGNOSIS — F41.1 ANXIETY STATE: ICD-10-CM

## 2023-07-31 DIAGNOSIS — J45.20 MILD INTERMITTENT ASTHMA WITHOUT COMPLICATION: ICD-10-CM

## 2023-07-31 DIAGNOSIS — F31.4 BIPOLAR DISORDER, CURRENT EPISODE DEPRESSED, SEVERE, WITHOUT PSYCHOTIC FEATURES (H): Primary | ICD-10-CM

## 2023-07-31 PROCEDURE — 99213 OFFICE O/P EST LOW 20 MIN: CPT | Performed by: NURSE PRACTITIONER

## 2023-07-31 PROCEDURE — G0463 HOSPITAL OUTPT CLINIC VISIT: HCPCS

## 2023-07-31 ASSESSMENT — PATIENT HEALTH QUESTIONNAIRE - PHQ9
SUM OF ALL RESPONSES TO PHQ QUESTIONS 1-9: 26
10. IF YOU CHECKED OFF ANY PROBLEMS, HOW DIFFICULT HAVE THESE PROBLEMS MADE IT FOR YOU TO DO YOUR WORK, TAKE CARE OF THINGS AT HOME, OR GET ALONG WITH OTHER PEOPLE: EXTREMELY DIFFICULT
SUM OF ALL RESPONSES TO PHQ QUESTIONS 1-9: 26

## 2023-07-31 ASSESSMENT — ANXIETY QUESTIONNAIRES
7. FEELING AFRAID AS IF SOMETHING AWFUL MIGHT HAPPEN: NEARLY EVERY DAY
GAD7 TOTAL SCORE: 21
1. FEELING NERVOUS, ANXIOUS, OR ON EDGE: NEARLY EVERY DAY
4. TROUBLE RELAXING: NEARLY EVERY DAY
GAD7 TOTAL SCORE: 21
5. BEING SO RESTLESS THAT IT IS HARD TO SIT STILL: NEARLY EVERY DAY
IF YOU CHECKED OFF ANY PROBLEMS ON THIS QUESTIONNAIRE, HOW DIFFICULT HAVE THESE PROBLEMS MADE IT FOR YOU TO DO YOUR WORK, TAKE CARE OF THINGS AT HOME, OR GET ALONG WITH OTHER PEOPLE: EXTREMELY DIFFICULT
3. WORRYING TOO MUCH ABOUT DIFFERENT THINGS: NEARLY EVERY DAY
6. BECOMING EASILY ANNOYED OR IRRITABLE: NEARLY EVERY DAY
2. NOT BEING ABLE TO STOP OR CONTROL WORRYING: NEARLY EVERY DAY

## 2023-07-31 ASSESSMENT — ASTHMA QUESTIONNAIRES: ACT_TOTALSCORE: 14

## 2023-07-31 ASSESSMENT — PAIN SCALES - GENERAL: PAINLEVEL: MODERATE PAIN (5)

## 2023-08-03 ENCOUNTER — PRENATAL OFFICE VISIT (OUTPATIENT)
Dept: OBGYN | Facility: OTHER | Age: 23
End: 2023-08-03
Attending: OBSTETRICS & GYNECOLOGY
Payer: COMMERCIAL

## 2023-08-03 VITALS — SYSTOLIC BLOOD PRESSURE: 114 MMHG | DIASTOLIC BLOOD PRESSURE: 74 MMHG | WEIGHT: 191 LBS | BODY MASS INDEX: 33.83 KG/M2

## 2023-08-03 DIAGNOSIS — Z34.83 ENCOUNTER FOR SUPERVISION OF OTHER NORMAL PREGNANCY IN THIRD TRIMESTER: Primary | ICD-10-CM

## 2023-08-03 PROCEDURE — 99207 PR PRENATAL VISIT: CPT | Performed by: OBSTETRICS & GYNECOLOGY

## 2023-08-03 PROCEDURE — G0463 HOSPITAL OUTPT CLINIC VISIT: HCPCS

## 2023-08-03 ASSESSMENT — PAIN SCALES - GENERAL: PAINLEVEL: MODERATE PAIN (5)

## 2023-08-03 NOTE — PROGRESS NOTES
SUBJECTIVE  Meg Diallo is a 23 year old  with Patient's last menstrual period was 2022.. Estimated Date of Delivery: Aug 8, 2023. Gestational age is 39w2d. She presents for follow up OB visit.    She feels good fetal movement.  She continues to have irregular contractions which resolve with rest.  Denies vaginal bleeding or leaking fluid. No abnormal vaginal discharge, difficulty urinating, fever or chills. No headache, vision changes, lower extremity swelling, upper abdominal pain, chest pain, or shortness of breath.  She denies depression symptoms.  She denies asthma symptoms, wheezing, or difficulty breathing.  She has not used her inhaler in 8 months.    OBJECTIVE  /74   Wt 86.6 kg (191 lb)   LMP 2022   BMI 33.83 kg/m      General:  Well-developed well-nourished gravid female in no apparent distress. Alert and oriented x3.  Abdomen: Soft, gravid, non tender, positive bowel sounds. Fundal height at 36 cm. Fetal heart tones auscultated at 136.  Presentation noted to be cephalic by Leopold.  Cervix: 2 cm, 70 % effacement, -2 station, vertex presentation  Extremities:  No clubbing, cyanosis, or edema. Nontender bilaterally.    Chaperone: Yael Reyes LPN    DIAGNOSTICS  2023 GBS Negative   2023 OB ultrasound: 36 6/7 wk, 3014 gm, 40%, cephalic presentation, posterior placenta, normal REI, appropriate interval fetal growth.      ASSESSMENT / PLAN  1 Intrauterine pregnancy at 39w2d.  2 History of asthma  3 History of depression, anxiety, and bipolar disorder  4 Size less than dates    - Problem list reviewed and updated.  - Pregnancy weight gain has been 15.9 kg (35 lb 2.2 oz) to date, which is adequate.  - The patient plans to breast feed and she is undecided for contraception after delivery.  - Recent OB ultrasound shows appropriate interval fetal growth and normal fluid level.  - I recommend the patient see her primary provider for asthma action plan in pregnancy.  - Asthma  precautions in pregnancy are reviewed with the patient.  - I reviewed routine obstetrical precautions, recommendations and instructions with the patient including fetal movement and kick count instructions.  - I reviewed depression precautions.  - I reviewed pre-eclampsia precautions with the patient with instructions for the patient to come in for persistent headache unrelieved with Tylenol, blurry vision, right upper quadrant pain, shortness of breath, or significant persistent edema.  - I reviewed labor precautions with the patient with instructions for the patient to come in for decreased fetal movement, suspected rupture of membranes, regular contraction pattern, vaginal bleeding or any other concerning symptoms.  - Follow up in 1 week.    Jacobo Mcknight MD  Obstetrics and Gynecology

## 2023-08-13 ENCOUNTER — HOSPITAL ENCOUNTER (EMERGENCY)
Facility: HOSPITAL | Age: 23
Discharge: HOME | End: 2023-08-13
Attending: INTERNAL MEDICINE | Admitting: INTERNAL MEDICINE
Payer: COMMERCIAL

## 2023-08-13 VITALS
HEART RATE: 72 BPM | DIASTOLIC BLOOD PRESSURE: 89 MMHG | TEMPERATURE: 98.5 F | SYSTOLIC BLOOD PRESSURE: 136 MMHG | OXYGEN SATURATION: 99 % | RESPIRATION RATE: 16 BRPM

## 2023-08-13 DIAGNOSIS — R53.83 OTHER FATIGUE: ICD-10-CM

## 2023-08-13 DIAGNOSIS — F41.9 ANXIETY: ICD-10-CM

## 2023-08-13 LAB
ALBUMIN SERPL BCG-MCNC: 3.5 G/DL (ref 3.5–5.2)
ALBUMIN UR-MCNC: NEGATIVE MG/DL
ALP SERPL-CCNC: 86 U/L (ref 35–104)
ALT SERPL W P-5'-P-CCNC: 25 U/L (ref 0–50)
AMORPH CRY #/AREA URNS HPF: ABNORMAL /HPF
ANION GAP SERPL CALCULATED.3IONS-SCNC: 12 MMOL/L (ref 7–15)
APPEARANCE UR: ABNORMAL
AST SERPL W P-5'-P-CCNC: 30 U/L (ref 0–45)
BASOPHILS # BLD AUTO: 0 10E3/UL (ref 0–0.2)
BASOPHILS NFR BLD AUTO: 0 %
BILIRUB SERPL-MCNC: 0.2 MG/DL
BILIRUB UR QL STRIP: NEGATIVE
BUN SERPL-MCNC: 12.1 MG/DL (ref 6–20)
CALCIUM SERPL-MCNC: 9.5 MG/DL (ref 8.6–10)
CHLORIDE SERPL-SCNC: 105 MMOL/L (ref 98–107)
COLOR UR AUTO: ABNORMAL
CREAT SERPL-MCNC: 0.48 MG/DL (ref 0.51–0.95)
DEPRECATED HCO3 PLAS-SCNC: 22 MMOL/L (ref 22–29)
EOSINOPHIL # BLD AUTO: 0.3 10E3/UL (ref 0–0.7)
EOSINOPHIL NFR BLD AUTO: 3 %
ERYTHROCYTE [DISTWIDTH] IN BLOOD BY AUTOMATED COUNT: 13.3 % (ref 10–15)
GFR SERPL CREATININE-BSD FRML MDRD: >90 ML/MIN/1.73M2
GLUCOSE SERPL-MCNC: 95 MG/DL (ref 70–99)
GLUCOSE UR STRIP-MCNC: NEGATIVE MG/DL
HCT VFR BLD AUTO: 32.4 % (ref 35–47)
HGB BLD-MCNC: 10.8 G/DL (ref 11.7–15.7)
HGB UR QL STRIP: ABNORMAL
HOLD SPECIMEN: NORMAL
IMM GRANULOCYTES # BLD: 0.1 10E3/UL
IMM GRANULOCYTES NFR BLD: 1 %
KETONES UR STRIP-MCNC: NEGATIVE MG/DL
LEUKOCYTE ESTERASE UR QL STRIP: ABNORMAL
LYMPHOCYTES # BLD AUTO: 2.3 10E3/UL (ref 0.8–5.3)
LYMPHOCYTES NFR BLD AUTO: 24 %
MCH RBC QN AUTO: 27.6 PG (ref 26.5–33)
MCHC RBC AUTO-ENTMCNC: 33.3 G/DL (ref 31.5–36.5)
MCV RBC AUTO: 83 FL (ref 78–100)
MONOCYTES # BLD AUTO: 0.7 10E3/UL (ref 0–1.3)
MONOCYTES NFR BLD AUTO: 8 %
MUCOUS THREADS #/AREA URNS LPF: PRESENT /LPF
NEUTROPHILS # BLD AUTO: 6.1 10E3/UL (ref 1.6–8.3)
NEUTROPHILS NFR BLD AUTO: 64 %
NITRATE UR QL: NEGATIVE
NRBC # BLD AUTO: 0 10E3/UL
NRBC BLD AUTO-RTO: 0 /100
PH UR STRIP: 6.5 [PH] (ref 4.7–8)
PLATELET # BLD AUTO: 294 10E3/UL (ref 150–450)
POTASSIUM SERPL-SCNC: 3.5 MMOL/L (ref 3.4–5.3)
PROT SERPL-MCNC: 6.9 G/DL (ref 6.4–8.3)
RBC # BLD AUTO: 3.91 10E6/UL (ref 3.8–5.2)
RBC URINE: 22 /HPF
SODIUM SERPL-SCNC: 139 MMOL/L (ref 136–145)
SP GR UR STRIP: 1.02 (ref 1–1.03)
SQUAMOUS EPITHELIAL: 1 /HPF
UROBILINOGEN UR STRIP-MCNC: NORMAL MG/DL
WBC # BLD AUTO: 9.5 10E3/UL (ref 4–11)
WBC URINE: 10 /HPF

## 2023-08-13 PROCEDURE — 99284 EMERGENCY DEPT VISIT MOD MDM: CPT | Performed by: INTERNAL MEDICINE

## 2023-08-13 PROCEDURE — 80053 COMPREHEN METABOLIC PANEL: CPT | Performed by: INTERNAL MEDICINE

## 2023-08-13 PROCEDURE — 85025 COMPLETE CBC W/AUTO DIFF WBC: CPT | Performed by: INTERNAL MEDICINE

## 2023-08-13 PROCEDURE — 99283 EMERGENCY DEPT VISIT LOW MDM: CPT

## 2023-08-13 PROCEDURE — 81001 URINALYSIS AUTO W/SCOPE: CPT | Performed by: INTERNAL MEDICINE

## 2023-08-13 PROCEDURE — 87086 URINE CULTURE/COLONY COUNT: CPT | Performed by: INTERNAL MEDICINE

## 2023-08-13 PROCEDURE — 36415 COLL VENOUS BLD VENIPUNCTURE: CPT | Performed by: INTERNAL MEDICINE

## 2023-08-13 ASSESSMENT — ACTIVITIES OF DAILY LIVING (ADL): ADLS_ACUITY_SCORE: 36

## 2023-08-13 ASSESSMENT — ENCOUNTER SYMPTOMS
COUGH: 0
ARTHRALGIAS: 0
WHEEZING: 0
DIAPHORESIS: 0
NECK STIFFNESS: 0
ABDOMINAL PAIN: 0
FATIGUE: 1
NAUSEA: 0
VOICE CHANGE: 0
ABDOMINAL DISTENTION: 0
LIGHT-HEADEDNESS: 0
SLEEP DISTURBANCE: 1
FEVER: 0
PALPITATIONS: 0
HEMATURIA: 0
COLOR CHANGE: 0
BACK PAIN: 0
FLANK PAIN: 0
NECK PAIN: 0
ANAL BLEEDING: 0
CHEST TIGHTNESS: 0
DIZZINESS: 1
NUMBNESS: 0
ANOREXIA: 1
HEADACHES: 0
DYSURIA: 0
CONFUSION: 0
BLOOD IN STOOL: 0
MYALGIAS: 0
VOMITING: 0
NERVOUS/ANXIOUS: 1
SHORTNESS OF BREATH: 0
WOUND: 0
CHILLS: 0

## 2023-08-13 NOTE — ED TRIAGE NOTES
Patient presents with complaints of feeling weak. She states this has been going on since a day or two after she delivered her baby on 8/09.

## 2023-08-13 NOTE — ED PROVIDER NOTES
History     Chief Complaint   Patient presents with    Generalized Weakness     The history is provided by the patient.   Fatigue  Severity:  Moderate  Onset quality:  Gradual  Duration:  2 days  Timing:  Constant  Chronicity:  New  Relieved by:  Nothing  Associated symptoms: anorexia and dizziness    Associated symptoms: no abdominal pain, no arthralgias, no chest pain, no cough, no dysuria, no fever, no headaches, no myalgias, no nausea, no shortness of breath and no vomiting          Allergies:  Allergies   Allergen Reactions    Duloxetine Hcl     Lexapro [Escitalopram] Other (See Comments)     hypersensitivity to touch     Duloxetine Other (See Comments)       Problem List:    Patient Active Problem List    Diagnosis Date Noted    Encounter for supervision of normal pregnancy in third trimester 2023     Priority: Medium     Pregnancy Overview  23 year old  with LMP 2022, LUIS EDUARDO 2023.    1 Cutaneous abscess face at 11 wk (2023)  2 History of asthma  3 History of depression, anxiety, and bipolar disorder  4 Size less than dates    - Albuterol MDI  - Clindamycin 2023  - Stopped Zoloft, Wellbutrin, and Ativan  - TdaP given  - Influenza up to date  - Asthma action plan  - NIPT negative 46XY  - Covid immunization x 1    Type and Rh: A Positive  ABS: Negative  Rubella: Positive  Treponema: Negative  HBsAg: Negative  Hepatitis C: Negative  HIV: Negative  Hgb: 11.0  Hematocrit: 32.7  Plt: 254  TSH: 0.88  Chlamydia: Negative  Gonorrhea: Negative  UDS: Negative  Glucose screen: 117  UCx: Negative  GBS: Negative    2023 Pap: NILM      Mild intermittent asthma without complication 2022     Priority: Medium    Major depressive disorder, recurrent episode, moderate (H) 2016     Priority: Medium    Bipolar disorder, unspecified (H) 2016     Priority: Medium    Anxiety state 2012     Priority: Medium     Overview:   IMO Update 10/11      Mild intellectual  disabilities 01/05/2012     Priority: Medium     Overview:   IMO Update 10/11          Past Medical History:    Past Medical History:   Diagnosis Date    Anxiety state 1/6/2012    Attention deficit disorder of childhood without me 03/05/2007    Bipolar disorder, unspecified (H) 5/5/2016    Depressive disorder     Facial hemangioma 07/23/2012    Major depressive disorder, recurrent episode, moderate (H) 12/26/2016    Menorrhagia with irregular cycle 1/19/2021    Mild intellectual disabilities 1/5/2012    Mild intermittent asthma with status asthmaticus 8/18/2022    Nasal turbinate hypertrophy 07/23/2012    Tonsillar hypertrophy 07/23/2012       Past Surgical History:    Past Surgical History:   Procedure Laterality Date    HEAD & NECK SURGERY      laser surgery to birthmark on left side of face    LAPAROSCOPIC CYSTECTOMY OVARIAN (ONCOLOGY) Left 08/22/2019    Procedure: LAPAROSCOPY TREAT LEFT OVARIAN CYST, MULTIPLE ASPIRATIONS LEFT OVARIAN CYST;  Surgeon: Frow, David Franke, MD;  Location: [facility]       Family History:    Family History   Problem Relation Age of Onset    Bipolar Disorder Mother     Depression Mother     Heart Disease Father     Diabetes Other     Myocardial Infarction Paternal Grandfather     Lung Cancer Paternal Grandfather     Other - See Comments Paternal Aunt         mulitiple sclerosis    Breast Cancer Maternal Grandfather         x 2       Social History:  Marital Status:  Single [1]  Social History     Tobacco Use    Smoking status: Never    Smokeless tobacco: Never   Vaping Use    Vaping Use: Never used   Substance Use Topics    Alcohol use: No    Drug use: No        Medications:    albuterol (ACCUNEB) 1.25 MG/3ML neb solution  albuterol (PROAIR HFA/PROVENTIL HFA/VENTOLIN HFA) 108 (90 Base) MCG/ACT inhaler  hydrocortisone 2.5 % ointment  Prenatal Vit-Fe Fumarate-FA (PRENATAL MULTIVITAMIN W/IRON) 27-0.8 MG tablet  triamcinolone (KENALOG) 0.1 % external cream          Review of Systems    Constitutional:  Positive for fatigue. Negative for chills, diaphoresis and fever.   HENT:  Negative for voice change.    Eyes:  Negative for visual disturbance.   Respiratory:  Negative for cough, chest tightness, shortness of breath and wheezing.    Cardiovascular:  Negative for chest pain, palpitations and leg swelling.   Gastrointestinal:  Positive for anorexia. Negative for abdominal distention, abdominal pain, anal bleeding, blood in stool, nausea and vomiting.   Genitourinary:  Negative for decreased urine volume, dysuria, flank pain and hematuria.   Musculoskeletal:  Negative for arthralgias, back pain, gait problem, myalgias, neck pain and neck stiffness.   Skin:  Negative for color change, pallor, rash and wound.   Neurological:  Positive for dizziness. Negative for syncope, light-headedness, numbness and headaches.   Psychiatric/Behavioral:  Positive for sleep disturbance. Negative for confusion and suicidal ideas. The patient is nervous/anxious.        Physical Exam   BP: 129/85  Pulse: 96  Temp: 98.5  F (36.9  C)  Resp: 16  SpO2: 98 %  Lying Orthostatic BP: 133/79  Lying Orthostatic Pulse: 76 bpm  Sitting Orthostatic BP: 123/105  Sitting Orthostatic Pulse: 102 bpm  Standing Orthostatic BP: 141/103  Standing Orthostatic Pulse: 107 bpm      Physical Exam  Vitals and nursing note reviewed.   Constitutional:       Appearance: She is well-developed.   HENT:      Head: Normocephalic and atraumatic.      Mouth/Throat:      Pharynx: No oropharyngeal exudate.   Eyes:      Conjunctiva/sclera: Conjunctivae normal.      Pupils: Pupils are equal, round, and reactive to light.   Neck:      Thyroid: No thyromegaly.      Vascular: No JVD.      Trachea: No tracheal deviation.   Cardiovascular:      Rate and Rhythm: Normal rate and regular rhythm.      Heart sounds: Normal heart sounds. No murmur heard.     No friction rub. No gallop.   Pulmonary:      Effort: Pulmonary effort is normal. No respiratory distress.       Breath sounds: Normal breath sounds. No stridor. No wheezing or rales.   Chest:      Chest wall: No tenderness.   Abdominal:      General: Bowel sounds are normal. There is no distension.      Palpations: Abdomen is soft. There is no mass.      Tenderness: There is no abdominal tenderness. There is no guarding or rebound.   Musculoskeletal:         General: No tenderness. Normal range of motion.      Cervical back: Normal range of motion and neck supple.   Lymphadenopathy:      Cervical: No cervical adenopathy.   Skin:     General: Skin is warm and dry.      Coloration: Skin is not pale.      Findings: No erythema or rash.   Neurological:      Mental Status: She is alert and oriented to person, place, and time.   Psychiatric:         Mood and Affect: Mood is anxious.         Behavior: Behavior normal.         ED Course                 Procedures              Results for orders placed or performed during the hospital encounter of 08/13/23 (from the past 24 hour(s))   Comprehensive metabolic panel   Result Value Ref Range    Sodium 139 136 - 145 mmol/L    Potassium 3.5 3.4 - 5.3 mmol/L    Chloride 105 98 - 107 mmol/L    Carbon Dioxide (CO2) 22 22 - 29 mmol/L    Anion Gap 12 7 - 15 mmol/L    Urea Nitrogen 12.1 6.0 - 20.0 mg/dL    Creatinine 0.48 (L) 0.51 - 0.95 mg/dL    Calcium 9.5 8.6 - 10.0 mg/dL    Glucose 95 70 - 99 mg/dL    Alkaline Phosphatase 86 35 - 104 U/L    AST 30 0 - 45 U/L    ALT 25 0 - 50 U/L    Protein Total 6.9 6.4 - 8.3 g/dL    Albumin 3.5 3.5 - 5.2 g/dL    Bilirubin Total 0.2 <=1.2 mg/dL    GFR Estimate >90 >60 mL/min/1.73m2   CBC with Platelets & Differential    Narrative    The following orders were created for panel order CBC with Platelets & Differential.  Procedure                               Abnormality         Status                     ---------                               -----------         ------                     CBC with platelets and d...[[id number]]  Abnormal            Final  result                 Please view results for these tests on the individual orders.   CBC with platelets and differential   Result Value Ref Range    WBC Count 9.5 4.0 - 11.0 10e3/uL    RBC Count 3.91 3.80 - 5.20 10e6/uL    Hemoglobin 10.8 (L) 11.7 - 15.7 g/dL    Hematocrit 32.4 (L) 35.0 - 47.0 %    MCV 83 78 - 100 fL    MCH 27.6 26.5 - 33.0 pg    MCHC 33.3 31.5 - 36.5 g/dL    RDW 13.3 10.0 - 15.0 %    Platelet Count 294 150 - 450 10e3/uL    % Neutrophils 64 %    % Lymphocytes 24 %    % Monocytes 8 %    % Eosinophils 3 %    % Basophils 0 %    % Immature Granulocytes 1 %    NRBCs per 100 WBC 0 <1 /100    Absolute Neutrophils 6.1 1.6 - 8.3 10e3/uL    Absolute Lymphocytes 2.3 0.8 - 5.3 10e3/uL    Absolute Monocytes 0.7 0.0 - 1.3 10e3/uL    Absolute Eosinophils 0.3 0.0 - 0.7 10e3/uL    Absolute Basophils 0.0 0.0 - 0.2 10e3/uL    Absolute Immature Granulocytes 0.1 <=0.4 10e3/uL    Absolute NRBCs 0.0 10e3/uL   Extra Tube    Narrative    The following orders were created for panel order Extra Tube.  Procedure                               Abnormality         Status                     ---------                               -----------         ------                     Extra Blue Top Tube[[id number]]                              Final result               Extra Red Top Tube[[id number]]                               Final result               Extra Heparinized Syringe[[id number]]                        Final result                 Please view results for these tests on the individual orders.   Extra Blue Top Tube   Result Value Ref Range    Hold Specimen JIC    Extra Red Top Tube   Result Value Ref Range    Hold Specimen JIC    Extra Heparinized Syringe   Result Value Ref Range    Hold Specimen JIC    UA Macroscopic with reflex to Microscopic and Culture    Specimen: Urine, NOS   Result Value Ref Range    Color Urine Light Yellow Colorless, Straw, Light Yellow, Yellow    Appearance Urine Slightly Cloudy (A) Clear    Glucose  Urine Negative Negative mg/dL    Bilirubin Urine Negative Negative    Ketones Urine Negative Negative mg/dL    Specific Gravity Urine 1.025 1.003 - 1.035    Blood Urine Large (A) Negative    pH Urine 6.5 4.7 - 8.0    Protein Albumin Urine Negative Negative mg/dL    Urobilinogen Urine Normal Normal, 2.0 mg/dL    Nitrite Urine Negative Negative    Leukocyte Esterase Urine Moderate (A) Negative    Mucus Urine Present (A) None Seen /LPF    Amorphous Crystals Urine Few (A) None Seen /HPF    RBC Urine 22 (H) <=2 /HPF    WBC Urine 10 (H) <=5 /HPF    Squamous Epithelials Urine 1 <=1 /HPF    Narrative    Urine Culture ordered based on laboratory criteria       Medications - No data to display    Assessments & Plan (with Medical Decision Making)   Anxiety , dizziness  Labs reviewed  I advised oral hydration at home, follow-up with PCP for reevaluation , return to ER if symptoms got worse    I have reviewed the nursing notes.    I have reviewed the findings, diagnosis, plan and need for follow up with the patient.        Discharge Medication List as of 8/13/2023  3:58 AM          Final diagnoses:   Other fatigue   Anxiety       8/13/2023   [facility]       Jamarcus Álvarez MD  08/13/23 [id number]

## 2023-08-13 NOTE — ED NOTES
Patient presents with complaints of feeling just tired and weak. She states she's not eating much as she feels nauseated when she eats and that this is the second baby and she's feeling heavy and just tired. She states some light headedness.

## 2023-08-14 LAB — BACTERIA UR CULT: NORMAL

## 2023-08-17 ENCOUNTER — PRENATAL OFFICE VISIT (OUTPATIENT)
Dept: OBGYN | Facility: OTHER | Age: 23
End: 2023-08-17
Attending: OBSTETRICS & GYNECOLOGY
Payer: COMMERCIAL

## 2023-08-17 VITALS
HEIGHT: 63 IN | DIASTOLIC BLOOD PRESSURE: 84 MMHG | BODY MASS INDEX: 30.48 KG/M2 | SYSTOLIC BLOOD PRESSURE: 122 MMHG | WEIGHT: 172 LBS

## 2023-08-17 PROBLEM — Z86.14 H/O METHICILLIN RESISTANT STAPHYLOCOCCUS AUREUS INFECTION: Chronic | Status: ACTIVE | Noted: 2017-10-30

## 2023-08-17 PROCEDURE — 99207 PR NO CHARGE LOS: CPT | Performed by: OBSTETRICS & GYNECOLOGY

## 2023-08-17 PROCEDURE — G0463 HOSPITAL OUTPT CLINIC VISIT: HCPCS

## 2023-08-17 RX ORDER — IBUPROFEN 800 MG/1
800 TABLET, FILM COATED ORAL
COMMUNITY
Start: 2023-08-11 | End: 2024-05-24

## 2023-08-17 RX ORDER — ACETAMINOPHEN 500 MG
1000 TABLET ORAL
COMMUNITY
Start: 2023-08-11 | End: 2024-05-24

## 2023-08-17 RX ORDER — CHOLECALCIFEROL (VITAMIN D3) 10(400)/ML
DROPS ORAL
COMMUNITY
Start: 2023-08-16 | End: 2023-08-17

## 2023-08-17 RX ORDER — LORAZEPAM 0.5 MG/1
1 TABLET ORAL 2 TIMES DAILY
COMMUNITY
Start: 2023-08-11

## 2023-08-17 ASSESSMENT — ANXIETY QUESTIONNAIRES
GAD7 TOTAL SCORE: 21
3. WORRYING TOO MUCH ABOUT DIFFERENT THINGS: NEARLY EVERY DAY
1. FEELING NERVOUS, ANXIOUS, OR ON EDGE: NEARLY EVERY DAY
IF YOU CHECKED OFF ANY PROBLEMS ON THIS QUESTIONNAIRE, HOW DIFFICULT HAVE THESE PROBLEMS MADE IT FOR YOU TO DO YOUR WORK, TAKE CARE OF THINGS AT HOME, OR GET ALONG WITH OTHER PEOPLE: EXTREMELY DIFFICULT
6. BECOMING EASILY ANNOYED OR IRRITABLE: NEARLY EVERY DAY
5. BEING SO RESTLESS THAT IT IS HARD TO SIT STILL: NEARLY EVERY DAY
2. NOT BEING ABLE TO STOP OR CONTROL WORRYING: NEARLY EVERY DAY
GAD7 TOTAL SCORE: 21
7. FEELING AFRAID AS IF SOMETHING AWFUL MIGHT HAPPEN: NEARLY EVERY DAY

## 2023-08-17 ASSESSMENT — PATIENT HEALTH QUESTIONNAIRE - PHQ9
5. POOR APPETITE OR OVEREATING: NEARLY EVERY DAY
SUM OF ALL RESPONSES TO PHQ QUESTIONS 1-9: 23

## 2023-08-17 ASSESSMENT — PAIN SCALES - GENERAL: PAINLEVEL: NO PAIN (0)

## 2023-08-17 NOTE — PROGRESS NOTES
POSTPARTUM VISIT    REASON FOR VISIT / CHEIF COMPLAINT  Early postpartum exam.    HISTORY OF PRESENT ILLNESS  She delivered at [facility] in [place] with another provider.  Meg Diallo is a 23 year old  female who is 1 weeks status post normal spontaneous vaginal delivery on 2023 delivering a healthy baby boy. She presents for early postpartum visit today. She is doing well.  She has light lochia and denies pain.  No difficulty with bowel movements or voiding.  She denies fever or chills.  She does have depression and anxiety and was restarted on lorazepam.  She denies suicidal thoughts.  She has an appointment with mental health provider on 2023. She is breast-feeding. Her infant is doing well.    Delivery complications: No.  Breast feeding: Yes.  Bladder problems: No.  Bowel problems / hemorrhoids: No.  Episiotomy / laceration / incision healed: Yes.  Vaginal lochia: Light.  Emotional adjustment: doing well, happy, and tired.  Depression symptoms or suicide thoughts: No.  Post delivery pain: No.  Contraception plans: Undecided.  Other concerns: No.    ALLERGIES     Allergies   Allergen Reactions    Duloxetine Hcl     Lexapro [Escitalopram] Other (See Comments)     hypersensitivity to touch     Duloxetine Other (See Comments)       MEDICATIONS    Current Outpatient Medications:     acetaminophen (TYLENOL) 500 MG tablet, Take 1,000 mg by mouth, Disp: , Rfl:     albuterol (ACCUNEB) 1.25 MG/3ML neb solution, Take 1.25 mg by nebulization every 6 hours as needed for shortness of breath, wheezing or cough, Disp: , Rfl:     albuterol (PROAIR HFA/PROVENTIL HFA/VENTOLIN HFA) 108 (90 Base) MCG/ACT inhaler, Inhale 2 puffs into the lungs every 6 hours as needed for shortness of breath or wheezing, Disp: 18 g, Rfl: 3    ibuprofen (ADVIL/MOTRIN) 800 MG tablet, Take 800 mg by mouth, Disp: , Rfl:     LORazepam (ATIVAN) 0.5 MG tablet, Take 1 tablet by mouth 2 times daily, Disp: , Rfl:     Prenatal  "Vit-Fe Fumarate-FA (PRENATAL MULTIVITAMIN W/IRON) 27-0.8 MG tablet, Take 1 tablet by mouth daily, Disp: 90 tablet, Rfl: 3    REVIEW OF SYSTEMS  As per HPI, otherwise negative.    The patient's Medical Hx, Surgical Hx, Obstetrical Hx, Social Hx, and Family Hx have been reviewed and updated in the electronic medical record.    OBJECTIVE  /84   Ht 1.6 m (5' 3\")   Wt 78 kg (172 lb)   LMP 2022   Breastfeeding Yes   BMI 30.47 kg/m      General:  Well-developed, well-nourished female in no apparent distress.  Neurological: Alert and oriented x3.  Breast: Deferred.  Abdomen: Soft, nontender, nondistended, positive bowel sounds.  No organomegaly. No rebound, no guarding. Fundus is firm and nontender above pubic symphysis.  Pelvic exam: Deferred.  Extremities:  No clubbing cyanosis or edema. Nontender bilaterally.    DIAGNOSTICS  2023 Pap: NILM   CBC Results  Recent Labs   Lab Test 23  0325   WBC 9.5   RBC 3.91   HGB 10.8*   HCT 32.4*   MCV 83         PHQ-9 score:       2023     1:47 PM   PHQ   PHQ-9 Total Score 23   Q9: Thoughts of better off dead/self-harm past 2 weeks Not at all       ASSESSMENT / PLAN  Meg Diallo is a 23 year old  female who is 2 weeks status post normal spontaneous vaginal delivery on 2023.    1 Normal early postpartum exam after vaginal delivery  - The patient is making excellent postpartum progress.  - Routine postpartum precautions, recommendations and restrictions are reviewed with the patient.  - Routine postpartum depression precautions are reviewed with the patient.  - I recommend that the patient refrain from intercourse for 6-8 weeks after delivery.  I recommend the patient be on reliable contraception before resuming intercourse.    2 Contraception counseling  - I discussed contraception options available to the patient including oral contraceptive pills both continuous and cyclic, progestin only oral contraceptive pills, Ortho Evra " patch, NuvaRing, Depo-Provera injections, Nexplanon, Mirena IUD, ParaGard IUD, Dona IUD and condoms.  I reviewed the risks, benefits, alternatives, indications and side effects of each of these contraception options with the patient.  - The patient is undecided.  - All questions were answered.  - Contraceptive compliance was emphasized.    3 Nursing  - Nursing is encouraged.  - Follow up with outpatient lactation appointment as needed.    4 Depression and anxiety  - The patient scored high on both PHQ-9 and RUFINA 7.  - I recommend the patient continues to take lorazepam as prescribed.  - I recommend the patient see her mental health provider for depression and anxiety follow-up.  - She reports that she has an appointment with mental health provider on 08/28/2023.  - Depression and anxiety precautions are reviewed with the patient.  - Emergency contact information for depression and anxiety are reviewed with the patient.    - All of the patient's questions were answered.  - Problem list reviewed and updated.  - Follow up in 4 weeks for postpartum visit including physical examination, pelvic examination and Pap smear as needed.    Jacobo Mcknight MD  Obstetrics and Gynecology

## 2023-09-12 NOTE — PROGRESS NOTES
"  Assessment & Plan     1. Bipolar disorder, current episode depressed, severe, without psychotic features (H)  Start buspar, sertraline and Vyvanse as prescribed by psychiatry.     2. Mild intermittent asthma without complication  Continue albuterol as needed  Start advair.    - fluticasone-salmeterol (ADVAIR HFA) 115-21 MCG/ACT inhaler; Inhale 2 puffs into the lungs 2 times daily  Dispense: 12 g; Refill: 3       BMI:   Estimated body mass index is 30.27 kg/m  as calculated from the following:    Height as of this encounter: 1.6 m (5' 3\").    Weight as of this encounter: 77.5 kg (170 lb 14.4 oz).           Return in about 1 month (around 10/15/2023) for asthma.    Tiara Hidalgo, NP  [facility]    Darnell Weaver is a 23 year old, presenting for the following health issues:  Depression, Anxiety, and Asthma        9/15/2023     3:28 PM   Additional Questions   Roomed by howard   Accompanied by none       HPI     Depression and Anxiety Follow-Up  How are you doing with your depression since your last visit? Worsened since had baby   How are you doing with your anxiety since your last visit?  Worsened since had baby   Are you having other symptoms that might be associated with depression or anxiety? Yes:  more crying spells lately   Have you had a significant life event? OTHER: had a baby and moving to different building     Do you have any concerns with your use of alcohol or other drugs? No  Did meet with her psychiatrist, started her on zoloft, buspar and vyvanse - she is afraid to start as what it might do to her.  She has been on these medications in the past - reminded her of this and she verbalized that she would go home and start them.      Is in counseling, saw her yesterday.      Social History     Tobacco Use    Smoking status: Never    Smokeless tobacco: Never   Vaping Use    Vaping Use: Never used   Substance Use Topics    Alcohol use: No    Drug use: No         7/31/2023 "     2:18 PM 8/17/2023     1:47 PM 9/15/2023     3:22 PM   PHQ   PHQ-9 Total Score 26 23 24   Q9: Thoughts of better off dead/self-harm past 2 weeks More than half the days Not at all Not at all   F/U: Thoughts of suicide or self-harm Yes     F/U: Self harm-plan No     F/U: Self-harm action No     F/U: Safety concerns No           7/31/2023     2:18 PM 8/17/2023     1:47 PM 9/15/2023     3:23 PM   RUFINA-7 SCORE   Total Score 21 (severe anxiety)  21 (severe anxiety)   Total Score 21 21 21         9/15/2023     3:22 PM   Last PHQ-9   1.  Little interest or pleasure in doing things 3   2.  Feeling down, depressed, or hopeless 3   3.  Trouble falling or staying asleep, or sleeping too much 3   4.  Feeling tired or having little energy 3   5.  Poor appetite or overeating 3   6.  Feeling bad about yourself 3   7.  Trouble concentrating 3   8.  Moving slowly or restless 3   Q9: Thoughts of better off dead/self-harm past 2 weeks 0   PHQ-9 Total Score 24         9/15/2023     3:23 PM   RUFINA-7    1. Feeling nervous, anxious, or on edge 3   2. Not being able to stop or control worrying 3   3. Worrying too much about different things 3   4. Trouble relaxing 3   5. Being so restless that it is hard to sit still 3   6. Becoming easily annoyed or irritable 3   7. Feeling afraid, as if something awful might happen 3   RUFINA-7 Total Score 21   If you checked any problems, how difficult have they made it for you to do your work, take care of things at home, or get along with other people? Extremely difficult       Asthma Follow-Up    Was ACT completed today?  Yes        9/15/2023     3:24 PM   ACT Total Scores   ACT TOTAL SCORE (Goal Greater than or Equal to 20) 14   In the past 12 months, how many times did you visit the emergency room for your asthma without being admitted to the hospital? 0   In the past 12 months, how many times were you hospitalized overnight because of your asthma? 0   How many days per week do you miss taking your  "asthma controller medication?  I do not have an asthma controller medication  Please describe any recent triggers for your asthma: smoke, strong odors and fumes, exercise or sports, and cold air  Have you had any Emergency Room Visits, Urgent Care Visits, or Hospital Admissions since your last office visit?  No  Is not on a controller mediation.  Is willing to start as breathing has not been as good as it was.  Symbicort gave her a sore throat.       BP Readings from Last 3 Encounters:   09/15/23 130/72   08/17/23 122/84   08/13/23 136/89    Wt Readings from Last 3 Encounters:   09/15/23 77.5 kg (170 lb 14.4 oz)   08/17/23 78 kg (172 lb)   08/03/23 86.6 kg (191 lb)                        Review of Systems   Constitutional, HEENT, cardiovascular, pulmonary, gi and gu systems are negative, except as otherwise noted.      Objective    /72 (BP Location: Left arm, Patient Position: Chair, Cuff Size: Adult Regular)   Pulse 79   Temp 98.2  F (36.8  C) (Tympanic)   Resp 16   Ht 1.6 m (5' 3\")   Wt 77.5 kg (170 lb 14.4 oz)   LMP 11/01/2022   SpO2 100%   Breastfeeding No   BMI 30.27 kg/m    Body mass index is 30.27 kg/m .  Physical Exam   GENERAL: alert and no distress  RESP: lungs clear to auscultation - no rales, rhonchi or wheezes  CV: regular rate and rhythm, normal S1 S2, no S3 or S4, no murmur  MS: no gross musculoskeletal defects noted, no edema  PSYCH: mentation appears normal and anxious                    Answers submitted by the patient for this visit:  Patient Health Questionnaire (Submitted on 9/15/2023)  If you checked off any problems, how difficult have these problems made it for you to do your work, take care of things at home, or get along with other people?: Extremely difficult  PHQ9 TOTAL SCORE: 24  RUFINA-7 (Submitted on 9/15/2023)  RUFINA 7 TOTAL SCORE: 21    "

## 2023-09-15 ENCOUNTER — OFFICE VISIT (OUTPATIENT)
Dept: FAMILY MEDICINE | Facility: OTHER | Age: 23
End: 2023-09-15
Attending: NURSE PRACTITIONER
Payer: COMMERCIAL

## 2023-09-15 VITALS
SYSTOLIC BLOOD PRESSURE: 130 MMHG | DIASTOLIC BLOOD PRESSURE: 72 MMHG | BODY MASS INDEX: 30.28 KG/M2 | WEIGHT: 170.9 LBS | RESPIRATION RATE: 16 BRPM | HEIGHT: 63 IN | HEART RATE: 79 BPM | TEMPERATURE: 98.2 F | OXYGEN SATURATION: 100 %

## 2023-09-15 DIAGNOSIS — J45.20 MILD INTERMITTENT ASTHMA WITHOUT COMPLICATION: ICD-10-CM

## 2023-09-15 DIAGNOSIS — F31.4 BIPOLAR DISORDER, CURRENT EPISODE DEPRESSED, SEVERE, WITHOUT PSYCHOTIC FEATURES (H): Primary | ICD-10-CM

## 2023-09-15 PROCEDURE — 99214 OFFICE O/P EST MOD 30 MIN: CPT | Performed by: NURSE PRACTITIONER

## 2023-09-15 PROCEDURE — G0463 HOSPITAL OUTPT CLINIC VISIT: HCPCS

## 2023-09-15 RX ORDER — SERTRALINE HYDROCHLORIDE 25 MG/1
1 TABLET, FILM COATED ORAL
COMMUNITY
Start: 2023-09-06 | End: 2024-05-24

## 2023-09-15 RX ORDER — BUSPIRONE HYDROCHLORIDE 5 MG/1
1 TABLET ORAL
COMMUNITY
Start: 2023-09-06 | End: 2024-05-24

## 2023-09-15 RX ORDER — LISDEXAMFETAMINE DIMESYLATE 20 MG/1
20 CAPSULE ORAL EVERY MORNING
COMMUNITY
Start: 2023-09-11 | End: 2024-01-31

## 2023-09-15 RX ORDER — FLUTICASONE PROPIONATE AND SALMETEROL XINAFOATE 115; 21 UG/1; UG/1
2 AEROSOL, METERED RESPIRATORY (INHALATION) 2 TIMES DAILY
Qty: 12 G | Refills: 3 | Status: SHIPPED | OUTPATIENT
Start: 2023-09-15 | End: 2023-11-29

## 2023-09-15 ASSESSMENT — ANXIETY QUESTIONNAIRES
7. FEELING AFRAID AS IF SOMETHING AWFUL MIGHT HAPPEN: NEARLY EVERY DAY
2. NOT BEING ABLE TO STOP OR CONTROL WORRYING: NEARLY EVERY DAY
IF YOU CHECKED OFF ANY PROBLEMS ON THIS QUESTIONNAIRE, HOW DIFFICULT HAVE THESE PROBLEMS MADE IT FOR YOU TO DO YOUR WORK, TAKE CARE OF THINGS AT HOME, OR GET ALONG WITH OTHER PEOPLE: EXTREMELY DIFFICULT
3. WORRYING TOO MUCH ABOUT DIFFERENT THINGS: NEARLY EVERY DAY
6. BECOMING EASILY ANNOYED OR IRRITABLE: NEARLY EVERY DAY
5. BEING SO RESTLESS THAT IT IS HARD TO SIT STILL: NEARLY EVERY DAY
1. FEELING NERVOUS, ANXIOUS, OR ON EDGE: NEARLY EVERY DAY
GAD7 TOTAL SCORE: 21
GAD7 TOTAL SCORE: 21
4. TROUBLE RELAXING: NEARLY EVERY DAY

## 2023-09-15 ASSESSMENT — PAIN SCALES - GENERAL: PAINLEVEL: NO PAIN (0)

## 2023-09-15 ASSESSMENT — ASTHMA QUESTIONNAIRES: ACT_TOTALSCORE: 14

## 2023-09-15 ASSESSMENT — PATIENT HEALTH QUESTIONNAIRE - PHQ9
10. IF YOU CHECKED OFF ANY PROBLEMS, HOW DIFFICULT HAVE THESE PROBLEMS MADE IT FOR YOU TO DO YOUR WORK, TAKE CARE OF THINGS AT HOME, OR GET ALONG WITH OTHER PEOPLE: EXTREMELY DIFFICULT
SUM OF ALL RESPONSES TO PHQ QUESTIONS 1-9: 24
SUM OF ALL RESPONSES TO PHQ QUESTIONS 1-9: 24

## 2023-09-15 NOTE — PATIENT INSTRUCTIONS
"  Assessment & Plan     1. Bipolar disorder, current episode depressed, severe, without psychotic features (H)  Start buspar, sertraline and Vyvanse as prescribed by psychiatry.     2. Mild intermittent asthma without complication  Continue albuterol as needed  Start advair.    - fluticasone-salmeterol (ADVAIR HFA) 115-21 MCG/ACT inhaler; Inhale 2 puffs into the lungs 2 times daily  Dispense: 12 g; Refill: 3       BMI:   Estimated body mass index is 30.27 kg/m  as calculated from the following:    Height as of this encounter: 1.6 m (5' 3\").    Weight as of this encounter: 77.5 kg (170 lb 14.4 oz).           Return in about 1 month (around 10/15/2023) for asthma.    Tiara Hidalgo, NP  [facility]  "

## 2023-09-21 ENCOUNTER — PRENATAL OFFICE VISIT (OUTPATIENT)
Dept: OBGYN | Facility: OTHER | Age: 23
End: 2023-09-21
Attending: NURSE PRACTITIONER
Payer: COMMERCIAL

## 2023-09-21 VITALS
BODY MASS INDEX: 28.32 KG/M2 | DIASTOLIC BLOOD PRESSURE: 72 MMHG | SYSTOLIC BLOOD PRESSURE: 116 MMHG | WEIGHT: 170 LBS | HEIGHT: 65 IN

## 2023-09-21 DIAGNOSIS — Z30.011 ENCOUNTER FOR INITIAL PRESCRIPTION OF CONTRACEPTIVE PILLS: ICD-10-CM

## 2023-09-21 PROBLEM — Z34.93 ENCOUNTER FOR SUPERVISION OF NORMAL PREGNANCY IN THIRD TRIMESTER: Status: RESOLVED | Noted: 2023-01-05 | Resolved: 2023-09-21

## 2023-09-21 PROBLEM — Z86.14 H/O METHICILLIN RESISTANT STAPHYLOCOCCUS AUREUS INFECTION: Chronic | Status: RESOLVED | Noted: 2017-10-30 | Resolved: 2023-09-21

## 2023-09-21 PROCEDURE — G0463 HOSPITAL OUTPT CLINIC VISIT: HCPCS

## 2023-09-21 PROCEDURE — 99207 PR POST PARTUM EXAM: CPT | Performed by: OBSTETRICS & GYNECOLOGY

## 2023-09-21 RX ORDER — NORGESTIMATE AND ETHINYL ESTRADIOL 0.25-0.035
1 KIT ORAL DAILY
Qty: 84 TABLET | Refills: 3 | Status: SHIPPED | OUTPATIENT
Start: 2023-09-21 | End: 2024-01-31

## 2023-09-21 ASSESSMENT — ANXIETY QUESTIONNAIRES
6. BECOMING EASILY ANNOYED OR IRRITABLE: NEARLY EVERY DAY
1. FEELING NERVOUS, ANXIOUS, OR ON EDGE: NEARLY EVERY DAY
3. WORRYING TOO MUCH ABOUT DIFFERENT THINGS: NEARLY EVERY DAY
GAD7 TOTAL SCORE: 21
IF YOU CHECKED OFF ANY PROBLEMS ON THIS QUESTIONNAIRE, HOW DIFFICULT HAVE THESE PROBLEMS MADE IT FOR YOU TO DO YOUR WORK, TAKE CARE OF THINGS AT HOME, OR GET ALONG WITH OTHER PEOPLE: EXTREMELY DIFFICULT
GAD7 TOTAL SCORE: 21
2. NOT BEING ABLE TO STOP OR CONTROL WORRYING: NEARLY EVERY DAY
7. FEELING AFRAID AS IF SOMETHING AWFUL MIGHT HAPPEN: NEARLY EVERY DAY
5. BEING SO RESTLESS THAT IT IS HARD TO SIT STILL: NEARLY EVERY DAY

## 2023-09-21 ASSESSMENT — PATIENT HEALTH QUESTIONNAIRE - PHQ9
5. POOR APPETITE OR OVEREATING: NEARLY EVERY DAY
SUM OF ALL RESPONSES TO PHQ QUESTIONS 1-9: 24

## 2023-09-21 ASSESSMENT — PAIN SCALES - GENERAL: PAINLEVEL: NO PAIN (0)

## 2023-09-21 NOTE — PROGRESS NOTES
POSTPARTUM VISIT    REASON FOR VISIT / CHEIF COMPLAINT  Postpartum exam.    HISTORY OF PRESENT ILLNESS  The patient delivered at [facility] in [place] with another provider.  She is seeing us for her postpartum visit.  Meg Diallo is a 23 year old  female who is 6 weeks status post normal spontaneous vaginal delivery on 2023 delivering a healthy baby boy. She presents for postpartum visit today.  Her lochia has resolved.  She denies pain, cramping, tenderness or discomfort.  No difficulty with voiding or bowel movements.  She does complain of both depression and anxiety symptoms.  She denies suicidal thoughts.  She saw her mental health provider last week and restarted Ativan, Zoloft, and Vyvanse.  The patient stopped breast-feeding and is now bottlefeeding exclusively. Her infant is doing well.    Delivery complications: No.  Breast feeding: No.  Bladder problems: No.  Bowel problems / hemorrhoids: No.  Episiotomy / laceration / incision healed: Yes.  Vaginal lochia: None.  Menses since delivery: No.  [place] since delivery: No.  Emotional adjustment: having some difficulties adjusting and sad.  Depression symptoms or suicide thoughts:  Depression symptoms and anxiety symptoms but denies suicidal thoughts as above .  Post delivery pain: No.  Contraception plans: oral contraceptive.  Other concerns: No.    ALLERGIES     Allergies   Allergen Reactions    Duloxetine Hcl     Lexapro [Escitalopram] Other (See Comments)     hypersensitivity to touch     Duloxetine Other (See Comments)       MEDICATIONS    Current Outpatient Medications:     acetaminophen (TYLENOL) 500 MG tablet, Take 1,000 mg by mouth, Disp: , Rfl:     albuterol (ACCUNEB) 1.25 MG/3ML neb solution, Take 1.25 mg by nebulization every 6 hours as needed for shortness of breath, wheezing or cough, Disp: , Rfl:     albuterol (PROAIR HFA/PROVENTIL HFA/VENTOLIN HFA) 108 (90 Base) MCG/ACT inhaler, Inhale 2 puffs into the lungs every 6  hours as needed for shortness of breath or wheezing, Disp: 18 g, Rfl: 3    busPIRone (BUSPAR) 5 MG tablet, Take 1 tablet by mouth 2 times daily, Disp: , Rfl:     fluticasone-salmeterol (ADVAIR HFA) 115-21 MCG/ACT inhaler, Inhale 2 puffs into the lungs 2 times daily, Disp: 12 g, Rfl: 3    ibuprofen (ADVIL/MOTRIN) 800 MG tablet, Take 800 mg by mouth, Disp: , Rfl:     LORazepam (ATIVAN) 0.5 MG tablet, Take 1 tablet by mouth 2 times daily, Disp: , Rfl:     norgestimate-ethinyl estradiol (ORTHO-CYCLEN) 0.25-35 MG-MCG tablet, Take 1 tablet by mouth daily, Disp: 84 tablet, Rfl: 3    Prenatal Vit-Fe Fumarate-FA (PRENATAL MULTIVITAMIN W/IRON) 27-0.8 MG tablet, Take 1 tablet by mouth daily, Disp: 90 tablet, Rfl: 3    sertraline (ZOLOFT) 25 MG tablet, Take 1 tablet by mouth daily at 2 pm, Disp: , Rfl:     VYVANSE 20 MG capsule, Take 20 mg by mouth every morning, Disp: , Rfl:     REVIEW OF SYSTEMS  General:  No fever, chills, fatigue, unintentional weight loss, or weight gain  HEENT:  No sore throat, nasal congestion, changes in vision or changes in hearing  Cardiovascular:  No chest pain, irregular heartbeat or racing heart  Respiratory:  No shortness of breath, cough, or wheezing  Gastrointestinal:  No nausea, vomiting, diarrhea, constipation or abdominal pain  Genitourinary:  No leaking urine, pain with urination, burning with urination, irregular vaginal bleeding, heavy periods, painful periods, abnormal vaginal discharge or leaking gas or stool  Skin:  No rashes or skin lesions  Breasts:  No masses or lumps, positive for discharge from the nipples  Neuro:  No difficulty with memory, numbness, tingling, or falls  Psych:  No anxiety, depression or difficulty sleeping  Endocrine:  No excessive thirst, hair loss, intolerance of heat or cold    Past Medical History:   Diagnosis Date    Anxiety state 1/6/2012    Overview:  IMO Update 10/11    Attention deficit disorder of childhood without me 03/05/2007    Bipolar disorder,  "unspecified (H) 2016    Depressive disorder     Facial hemangioma 2012    Major depressive disorder, recurrent episode, moderate (H) 2016    Menorrhagia with irregular cycle 2021    Mild intellectual disabilities 2012    IMO Update 10/11    Mild intermittent asthma with status asthmaticus 2022    Nasal turbinate hypertrophy 2012    Tonsillar hypertrophy 2012       Past Surgical History:   Procedure Laterality Date    HEAD & NECK SURGERY      laser surgery to birthmark on left side of face    LAPAROSCOPIC CYSTECTOMY OVARIAN (ONCOLOGY) Left 2019    Procedure: LAPAROSCOPY TREAT LEFT OVARIAN CYST, MULTIPLE ASPIRATIONS LEFT OVARIAN CYST;  Surgeon: Frow, David Franke, MD;  Location: [facility]       OB History    Para Term  AB Living   2 2 2 0 0 2   SAB IAB Ectopic Multiple Live Births   0 0 0 0 2      # Outcome Date GA Lbr [place]/2nd Weight Sex Delivery Anes PTL Lv   2 Term 23 40w1d / 00:33 3.942 kg (8 lb 11.1 oz) M Vag-Spont EPI N ENMANUEL      Name: YAMIL,BOY A BETH L      Apgar1: 7  Apgar5: 8   1 Term 17 39w6d  3.559 kg (7 lb 13.5 oz) F Vag-Spont  N ENMANUEL      Name: YAMILGIRL A BETH L      Apgar1: 7  Apgar5: 9       Social History     Tobacco Use    Smoking status: Never    Smokeless tobacco: Never   Substance Use Topics    Alcohol use: No     History   Sexual Activity    Sexual activity: Yes    Partners: Male    Birth control/ protection: None       Family History   Problem Relation Age of Onset    Bipolar Disorder Mother     Depression Mother     Heart Disease Father     Diabetes Other     Myocardial Infarction Paternal Grandfather     Lung Cancer Paternal Grandfather     Other - See Comments Paternal Aunt         mulitiple sclerosis    Breast Cancer Maternal Grandfather         x 2     OBJECTIVE  /72   Ht 1.651 m (5' 5\")   Wt 77.1 kg (170 lb)   LMP 2022   Breastfeeding No   BMI 28.29 kg/m      General:  Well-developed, well-nourished " female in no apparent distress.  Neurological: Alert and oriented x3.  Skin:  No rashes or lesions  Breast:  Breasts are bilaterally symmetrical. No masses, tenderness, retractions, skin changes, erythema, discharge or adenopathy bilaterally.  Lungs:  Clear to auscultation bilaterally with good inspiratory effort.  No wheezing rhonchi or rales noted. Breathing nonlabored.  Heart:  Regular rate and rhythm without murmur. No JVD.  No peripheral vascular disease.  Abdomen: Soft, nontender, nondistended, positive bowel sounds.  No organomegaly. No rebound, no guarding.  Pelvic exam:  Normal external female genitalia without lesions or abnormalities. Normal pubic hair distribution. Urethral meatus normal in appearance and without masses. Normal Bartholin, Urethral and Eckhart Mines's glands. Vaginal mucosa is pink and moist with a small amount of physiologic discharge present in the posterior vaginal fornix. Vaginal laceration is well healed.  Well estrogenized vaginal mucosa.   Normal multiparous cervix without lesions or abnormalities. No cervical motion tenderness to palpation. The bladder and urethra are nontender to palpation. Uterus is normal size, shape, consistency, anteflexed, mobile, and nontender. No adnexal masses or tenderness bilaterally.  Rectal exam:  No external hemorrhoids noted. No rectovaginal nodularity noted. Examination confirms the vaginal exam.  Extremities:  No clubbing cyanosis or edema. Nontender bilaterally.  Spine:  No costovertebral angle tenderness bilaterally.     Chaperone: Yael Reyes LPN    DIAGNOSTICS  2023 Pap: NILM      PHQ-9 score:       2023     1:34 PM   PHQ   PHQ-9 Total Score 24   Q9: Thoughts of better off dead/self-harm past 2 weeks Not at all       ASSESSMENT / PLAN  Meg Diallo is a 23 year old  female who is 6 weeks status post normal spontaneous vaginal delivery on 2023.    1 Normal postpartum exam after vaginal delivery  - The patient is making  excellent postpartum progress.  - Routine postpartum precautions, recommendations and restrictions are reviewed with the patient.  - Routine postpartum depression precautions are reviewed with the patient.  - The patient may resume all normal activities without restrictions.  - I recommend that the patient refrain from intercourse for 6-8 weeks after delivery.  I recommend the patient be on reliable contraception before resuming intercourse.  - Pap smear up-to-date    2 Contraception counseling  - I discussed contraception options available to the patient including oral contraceptive pills both continuous and cyclic, progestin only oral contraceptive pills, Ortho Evra patch, NuvaRing, Depo-Provera injections, Nexplanon, Mirena IUD, ParaGard IUD, Dona IUD and condoms.  I reviewed the risks, benefits, alternatives, indications and side effects of each of these contraception options with the patient.  - The patient will use oral contraceptive for birth control.  I gave the patient a prescription for Ortho-Cyclen 1 tablet p.o. daily #84 x 3 refill with instructions for use.  I recommend she start on Sunday.  - All questions were answered.  - Contraceptive compliance was emphasized.    3 Bottle feeding  - Nursing is encouraged.  - Follow up with outpatient lactation appointment as needed.    4 Depression and anxiety  - The patient scored high again on both PHQ-9 and RUFINA 7 today.  - I recommend the patient take Ativan, Zoloft, and Vyvanse as prescribed.  - I recommend the patient see her mental health provider for depression and anxiety follow-up.  - She reports that she has an appointment with mental health provider next week for follow-up.  - Depression and anxiety precautions are reviewed with the patient.  - Emergency contact information for depression and anxiety are reviewed with the patient.    - I recommend that the patient follow up with her primary provider for adult preventive services and annual examination  as needed.  - All of the patient's questions were answered.  - I will be happy to see the patient on an as needed basis.    - Problem list reviewed and updated.  - Follow up annually or sooner as needed.    Jacobo Mcknight MD  Obstetrics and Gynecology

## 2023-10-04 ENCOUNTER — OFFICE VISIT (OUTPATIENT)
Dept: FAMILY MEDICINE | Facility: OTHER | Age: 23
End: 2023-10-04
Attending: NURSE PRACTITIONER
Payer: COMMERCIAL

## 2023-10-04 VITALS
SYSTOLIC BLOOD PRESSURE: 110 MMHG | DIASTOLIC BLOOD PRESSURE: 72 MMHG | HEART RATE: 80 BPM | OXYGEN SATURATION: 99 % | BODY MASS INDEX: 28.11 KG/M2 | WEIGHT: 168.9 LBS | TEMPERATURE: 98 F

## 2023-10-04 DIAGNOSIS — K59.00 CONSTIPATION, UNSPECIFIED CONSTIPATION TYPE: ICD-10-CM

## 2023-10-04 DIAGNOSIS — K62.89 RECTAL PAIN: Primary | ICD-10-CM

## 2023-10-04 PROCEDURE — G0463 HOSPITAL OUTPT CLINIC VISIT: HCPCS | Mod: 25

## 2023-10-04 PROCEDURE — 99213 OFFICE O/P EST LOW 20 MIN: CPT | Performed by: NURSE PRACTITIONER

## 2023-10-04 RX ORDER — FLUCONAZOLE 150 MG/1
150 TABLET ORAL DAILY
Qty: 3 TABLET | Refills: 0 | Status: SHIPPED | OUTPATIENT
Start: 2023-10-04 | End: 2023-10-07

## 2023-10-04 RX ORDER — NYSTATIN AND TRIAMCINOLONE ACETONIDE 100000; 1 [USP'U]/G; MG/G
CREAM TOPICAL 2 TIMES DAILY PRN
Qty: 120 G | Refills: 1 | Status: SHIPPED | OUTPATIENT
Start: 2023-10-04

## 2023-10-04 RX ORDER — DOCUSATE SODIUM 100 MG/1
100 CAPSULE, LIQUID FILLED ORAL 2 TIMES DAILY PRN
Qty: 120 CAPSULE | Refills: 1 | Status: SHIPPED | OUTPATIENT
Start: 2023-10-04 | End: 2024-05-24

## 2023-10-04 ASSESSMENT — PAIN SCALES - GENERAL: PAINLEVEL: MODERATE PAIN (4)

## 2023-10-04 NOTE — PROGRESS NOTES
"  Assessment & Plan     1. Rectal pain  Sitz baths  - nystatin-triamcinolone (MYCOLOG II) 695488-6.1 UNIT/GM-% external cream; Apply topically 2 times daily as needed (skin irritation)  Dispense: 120 g; Refill: 1  - fluconazole (DIFLUCAN) 150 MG tablet; Take 1 tablet (150 mg) by mouth daily for 3 doses  Dispense: 3 tablet; Refill: 0    2. Constipation, unspecified constipation type  Increase water intake   - docusate sodium (COLACE) 100 MG capsule; Take 1 capsule (100 mg) by mouth 2 times daily as needed for constipation  Dispense: 120 capsule; Refill: 1         BMI:   Estimated body mass index is 28.11 kg/m  as calculated from the following:    Height as of 9/21/23: 1.651 m (5' 5\").    Weight as of this encounter: 76.6 kg (168 lb 14.4 oz).     Follow-up if no improvement or any worsening.     Tiara Hidalgo NP  [facility] [place] [facility]    [facility]   Meg is a 23 year old, presenting for the following health issues:  Rectal Problem        10/4/2023    12:57 PM   Additional Questions   Roomed by Ashia RAMIREZ LPN   Accompanied by self       HPI     Concern - Blood in stool   Onset: 3 days ago   Description: bleeding and more painful - did have hemorrhoids when pregnant with son- discomfort when using restroom to pass stool  Intensity: severe  Progression of Symptoms:  worsening  Accompanying Signs & Symptoms: more tired than normal  Previous history of similar problem: only when pregnant - Delivered son 8/9/23  Precipitating factors:        Worsened by: passing/straining at stool, sitting and walking is uncomfortable   Alleviating factors:        Improved by: tylenol   Therapies tried and outcome: tylenol    OB and ED notes reviewed dated 8/9/2023, 8/13/2023 and 9/21/2023.        Review of Systems   Constitutional, HEENT, cardiovascular, pulmonary, gi and gu systems are negative, except as otherwise noted.      Objective    /72 (BP Location: Left arm, Patient Position: Sitting, Cuff Size: " Adult Regular)   Pulse 80   Temp 98  F (36.7  C) (Tympanic)   Wt 76.6 kg (168 lb 14.4 oz)   LMP 09/29/2023   SpO2 99%   Breastfeeding No   BMI 28.11 kg/m    Body mass index is 28.11 kg/m .  Physical Exam   GENERAL: healthy, alert and no distress  RECTAL (female): small external hemorrhoid and intergluteal folds are erythematous, macerated and irritated in appearance.  Scant yellow film noted.    MS: no gross musculoskeletal defects noted, no edema                    .

## 2023-10-04 NOTE — COMMUNITY RESOURCES LIST (ENGLISH)
10/04/2023   [facility]  N/A  For questions about this resource list or additional care needs, please contact your primary care clinic or care manager.  Phone: [phone]   Email: N/A   Address: [address] [place] [id number]   Hours: N/A        Food and Nutrition       Food pantry  1  [facility] [employer] [facility] Free Pantry Distance: 2.34 miles      Pickup   [address] [place] [address]  Language: English  Hours: Mon - Sun Open 24 Hours  Fees: Free   Phone: (635) 718-4284 Email: esau@[employer].org Website: http://www.[employer].org     2  [facility] Distance: 17.96 miles      Pickup   [address] [place] [address]  Language: English  Hours: Mon 9:00 AM - 11:00 AM , Tue 1:00 PM - 3:00 PM , Wed - Thu 9:00 AM - 11:00 AM  Fees: Free, Self Pay   Phone: (174) 767-7663 Email: cm@[facility].org Website: https://[facility].org/northern/[place]     SNAP application assistance  3  [facility] Human [facility] Economic Services & [facility] Distance: 2.29 miles      In-Person, Phone/Virtual   [address] [place] [address]  Language: English  Hours: Mon - Fri 8:00 AM - 4:30 PM  Fees: Free   Phone: (242) 813-7793 Email: financialassistance@[facility].gov Website: https://www.[facility] Centerco[facility] Centern.gov/npakgenfvfr-y-w/public-[facility]-human-services/economic-services-supports     [address] [place] [address] Economic Services & [facility] Distance: 17.14 miles      In-Person, Phone/Virtual   [address] [place] [address]  Language: English  Hours: Mon - Fri 8:00 AM - 4:30 PM  Fees: Free   Phone: (482) 542-4265 Website: https://www.[place].gov/fucfbipbtxp-a-c/public-health-human-services/economic-services-supports     Soup kitchen or free meals  5  [facility]  [facility] Distance: 17.96 miles      Pickup   [address] [place] [address]  Language: English  Hours: Mon - Fri 4:00 PM - 4:45 PM  Fees: Free   Phone: (310) 723-2169 Email: cm@[facility].[employer] Website: https://centralusa.[facility].org/northern/[place]          Important Numbers & Websites       Emergency Services   911  [facility] Services   311  Poison Control   (365) 215-1922  Suicide Prevention Lifeline   (305) 479-1239 (TALK)  Child Abuse Hotline   (417) 804-3932 (4-A-Child)  Sexual Assault Hotline   (869) 719-4046 (HOPE)  National Runaway Safeline   (461) 945-8706 (RUNAWAY)  All-Options Talkline   (198) 205-7007  Substance Abuse Referral   (875) 328-8158 (HELP)

## 2023-10-04 NOTE — PATIENT INSTRUCTIONS
"  Assessment & Plan     1. Rectal pain  Sitz baths  - nystatin-triamcinolone (MYCOLOG II) 099625-4.1 UNIT/GM-% external cream; Apply topically 2 times daily as needed (skin irritation)  Dispense: 120 g; Refill: 1  - fluconazole (DIFLUCAN) 150 MG tablet; Take 1 tablet (150 mg) by mouth daily for 3 doses  Dispense: 3 tablet; Refill: 0    2. Constipation, unspecified constipation type  Increase water intake   - docusate sodium (COLACE) 100 MG capsule; Take 1 capsule (100 mg) by mouth 2 times daily as needed for constipation  Dispense: 120 capsule; Refill: 1         BMI:   Estimated body mass index is 28.11 kg/m  as calculated from the following:    Height as of 9/21/23: 1.651 m (5' 5\").    Weight as of this encounter: 76.6 kg (168 lb 14.4 oz).     Follow-up if no improvement or any worsening.     Tiara Hidalgo, NP  [facility]    "

## 2023-10-04 NOTE — COMMUNITY RESOURCES LIST (ENGLISH)
10/04/2023   [facility]  N/A  For questions about this resource list or additional care needs, please contact your primary care clinic or care manager.  Phone: [phone]   Email: N/A   Address: [address] [place] [address]   Hours: N/A        Food and Nutrition       Food pantry  1  [facility] [employer] [facility] Free Pantry Distance: 2.34 miles      Pickup   [address] [place] [address]  Language: English  Hours: Mon - Sun Open 24 Hours  Fees: Free   Phone: (808) 779-6995 Email: esau@[employer].org Website: http://www.[employer].org     2  [facility] Distance: 17.96 miles      Pickup   [address] [place] [address]  Language: English  Hours: Mon 9:00 AM - 11:00 AM , Tue 1:00 PM - 3:00 PM , Wed - Thu 9:00 AM - 11:00 AM  Fees: Free, Self Pay   Phone: (256) 137-1817 Email: cm@[facility].org Website: https://[facility].org/northern/[place]     SNAP application assistance  3  [facility] Human [facility] Economic Services & [facility] Distance: 2.29 miles      In-Person, Phone/Virtual   [address] [place] [address]  Language: English  Hours: Mon - Fri 8:00 AM - 4:30 PM  Fees: Free   Phone: (672) 289-3145 Email: financialassistance@[facility].gov Website: https://www.[facility]co[facility]n.gov/cygmqfgkhex-k-l/public-[facility]-human-services/economic-services-supports     [address] [place] [address] Economic Services & [facility] Distance: 17.14 miles      In-Person, Phone/Virtual   [address] [place] [address]  Language: English  Hours: Mon - Fri 8:00 AM - 4:30 PM  Fees: Free   Phone: (535) 334-2841 Website: https://www.[place].gov/vlorqvnijxq-m-k/public-health-human-services/economic-services-supports     Soup kitchen or free meals  5  [facility] [place]  [facility] Distance: 17.96 miles      Pickup   [address] [place] [address]  Language: English  Hours: Mon - Fri 4:00 PM - 4:45 PM  Fees: Free   Phone: (868) 320-6824 Email: cm@[facility].niiu Website: https://centralusa.[facility].org/northern/[place]          Important Numbers & Websites       Emergency Services   911  [facility] Services   311  Poison Control   (608) 314-6662  Suicide Prevention Lifeline   (463) 960-1272 (TALK)  Child Abuse Hotline   (187) 876-4284 (4-A-Child)  Sexual Assault Hotline   (403) 359-4046 (HOPE)  National Runaway Safeline   (451) 796-8756 (RUNAWAY)  All-Options Talkline   (382) 388-4070  Substance Abuse Referral   (507) 281-1959 (HELP)

## 2023-10-10 ENCOUNTER — MEDICAL CORRESPONDENCE (OUTPATIENT)
Dept: HEALTH INFORMATION MANAGEMENT | Facility: HOSPITAL | Age: 23
End: 2023-10-10

## 2023-10-13 NOTE — PROGRESS NOTES
"  Assessment & Plan     1. Bipolar disorder, current episode depressed, severe, without psychotic features (H)  improved    2. Major depressive disorder, recurrent episode, moderate (H)  Improved, continue following with mental ashok and therapy.      3. Mild intermittent asthma without complication  Improved, continue albuterol and advair.      4. Urinary retention  Referral placed for urology at [facility].    - Adult Urology  Referral; Future  - UA Macroscopic with reflex to Microscopic and Culture - normal    5. Dysuria  - UA Macroscopic with reflex to Microscopic and Culture           BMI:   Estimated body mass index is 27.96 kg/m  as calculated from the following:    Height as of this encounter: 1.651 m (5' 5\").    Weight as of this encounter: 76.2 kg (168 lb).         Follow-up in 3-6 months for asthma.        Tiara Hidalgo NP  [facility] [place] REHAN Weaver is a 23 year old, presenting for the following health issues:  Depression, Anxiety, and Asthma      HPI     Depression and Anxiety Follow-Up  How are you doing with your depression since your last visit? Improved -feeling better on medication , more manageable   How are you doing with your anxiety since your last visit?  Improved   Are you having other symptoms that might be associated with depression or anxiety? No  Have you had a significant life event? OTHER: moving  Into a larger apartment.    Do you have any concerns with your use of alcohol or other drugs? No  Following with mental health and counseling.      Social History     Tobacco Use    Smoking status: Never    Smokeless tobacco: Never   Vaping Use    Vaping Use: Never used   Substance Use Topics    Alcohol use: No    Drug use: No         9/15/2023     3:22 PM 9/21/2023     1:34 PM 10/16/2023    10:52 AM   PHQ   PHQ-9 Total Score 24 24 17   Q9: Thoughts of better off dead/self-harm past 2 weeks Not at all Not at all Not at all         9/15/2023     " 3:23 PM 9/21/2023     1:34 PM 10/16/2023    10:53 AM   RUFINA-7 SCORE   Total Score 21 (severe anxiety)  15 (severe anxiety)   Total Score 21 21 15         10/16/2023    10:52 AM   Last PHQ-9   1.  Little interest or pleasure in doing things 3   2.  Feeling down, depressed, or hopeless 2   3.  Trouble falling or staying asleep, or sleeping too much 2   4.  Feeling tired or having little energy 2   5.  Poor appetite or overeating 2   6.  Feeling bad about yourself 2   7.  Trouble concentrating 2   8.  Moving slowly or restless 2   Q9: Thoughts of better off dead/self-harm past 2 weeks 0   PHQ-9 Total Score 17         10/16/2023    10:53 AM   RUFINA-7    1. Feeling nervous, anxious, or on edge 3   2. Not being able to stop or control worrying 2   3. Worrying too much about different things 2   4. Trouble relaxing 2   5. Being so restless that it is hard to sit still 2   6. Becoming easily annoyed or irritable 2   7. Feeling afraid, as if something awful might happen 2   RUFINA-7 Total Score 15   If you checked any problems, how difficult have they made it for you to do your work, take care of things at home, or get along with other people? Extremely difficult       Suicide Assessment Five-step Evaluation and Treatment (SAFE-T)    Asthma Follow-Up    Was ACT completed today?  Yes        9/15/2023     3:24 PM   ACT Total Scores   ACT TOTAL SCORE (Goal Greater than or Equal to 20) 14   In the past 12 months, how many times did you visit the emergency room for your asthma without being admitted to the hospital? 0   In the past 12 months, how many times were you hospitalized overnight because of your asthma? 0        How many days per week do you miss taking your asthma controller medication?  3  Please describe any recent triggers for your asthma: smoke, dust mites, pollens, animal dander, mold, humidity, strong odors and fumes, exercise or sports, and cold air  Have you had any Emergency Room Visits, Urgent Care Visits, or  "Hospital Admissions since your last office visit?  No    Urinary issues:  Frequency with feelings on incomplete emptying.  Would like to see urology.        Recent Labs   Lab Test 08/13/23  0325 01/19/23  1353 05/11/21  0000 03/24/21  0000 01/29/21  0000 01/19/21  1428 08/21/20  0000 06/03/20  1047 05/08/18  1027 06/22/17  2357   A1C  --   --   --   --   --  5.1  --   --   --   --    ALT 25  --  15  --  15  --    < > 24   < >  --    CR 0.48*  --  0.81   < > 0.77  --    < > 0.68   < > 0.41*   GFRESTIMATED >90  --  >60   < > >60  --    < > >90   < > >90  Non  GFR Calc     GFRESTBLACK  --   --   --   --   --   --   --  >90  --  >90  African American GFR Calc     POTASSIUM 3.5  --  3.6   < > 3.0*  --    < > 3.6   < > 3.8   TSH  --  0.88  --   --  4.17* 1.46  --   --    < >  --     < > = values in this interval not displayed.      BP Readings from Last 3 Encounters:   10/16/23 124/74   10/04/23 110/72   09/21/23 116/72    Wt Readings from Last 3 Encounters:   10/16/23 76.2 kg (168 lb)   10/04/23 76.6 kg (168 lb 14.4 oz)   09/21/23 77.1 kg (170 lb)                      Review of Systems   Constitutional, HEENT, cardiovascular, pulmonary, gi and gu systems are negative, except as otherwise noted.      Objective    /74 (BP Location: Right arm, Patient Position: Sitting, Cuff Size: Adult Regular)   Pulse 85   Temp 98.4  F (36.9  C) (Tympanic)   Resp 16   Ht 1.651 m (5' 5\")   Wt 76.2 kg (168 lb)   LMP 09/29/2023   SpO2 99%   Breastfeeding No   BMI 27.96 kg/m    Body mass index is 27.96 kg/m .  Physical Exam   GENERAL: healthy, alert and no distress  RESP: lungs clear to auscultation - no rales, rhonchi or wheezes  CV: regular rate and rhythm, normal S1 S2, no S3 or S4, no murmur, click or rub, no peripheral edema and peripheral pulses strong  MS: no gross musculoskeletal defects noted, no edema  PSYCH: mentation appears normal, affect normal/bright    Results for orders placed or performed in " visit on 10/16/23   UA Macroscopic with reflex to Microscopic and Culture     Status: Abnormal    Specimen: Urine, Midstream   Result Value Ref Range    Color Urine Yellow Colorless, Straw, Light Yellow, Yellow    Appearance Urine Clear Clear    Glucose Urine Negative Negative mg/dL    Bilirubin Urine Negative Negative    Ketones Urine Negative Negative mg/dL    Specific Gravity Urine 1.010 1.003 - 1.035    Blood Urine Negative Negative    pH Urine 8.0 (H) 5.0 - 7.0    Protein Albumin Urine Negative Negative mg/dL    Urobilinogen Urine 0.2 0.2, 1.0 E.U./dL    Nitrite Urine Negative Negative    Leukocyte Esterase Urine Negative Negative    Narrative    Microscopic not indicated         Answers submitted by the patient for this visit:  Patient Health Questionnaire (Submitted on 10/16/2023)  If you checked off any problems, how difficult have these problems made it for you to do your work, take care of things at home, or get along with other people?: Extremely difficult  PHQ9 TOTAL SCORE: 17  RUFINA-7 (Submitted on 10/16/2023)  RUFINA 7 TOTAL SCORE: 15

## 2023-10-16 ENCOUNTER — OFFICE VISIT (OUTPATIENT)
Dept: FAMILY MEDICINE | Facility: OTHER | Age: 23
End: 2023-10-16
Attending: NURSE PRACTITIONER
Payer: COMMERCIAL

## 2023-10-16 VITALS
TEMPERATURE: 98.4 F | HEART RATE: 85 BPM | DIASTOLIC BLOOD PRESSURE: 74 MMHG | SYSTOLIC BLOOD PRESSURE: 124 MMHG | HEIGHT: 65 IN | WEIGHT: 168 LBS | RESPIRATION RATE: 16 BRPM | BODY MASS INDEX: 27.99 KG/M2 | OXYGEN SATURATION: 99 %

## 2023-10-16 DIAGNOSIS — F31.4 BIPOLAR DISORDER, CURRENT EPISODE DEPRESSED, SEVERE, WITHOUT PSYCHOTIC FEATURES (H): Primary | ICD-10-CM

## 2023-10-16 DIAGNOSIS — F33.1 MAJOR DEPRESSIVE DISORDER, RECURRENT EPISODE, MODERATE (H): ICD-10-CM

## 2023-10-16 DIAGNOSIS — R33.9 URINARY RETENTION: ICD-10-CM

## 2023-10-16 DIAGNOSIS — J45.20 MILD INTERMITTENT ASTHMA WITHOUT COMPLICATION: ICD-10-CM

## 2023-10-16 DIAGNOSIS — R30.0 DYSURIA: ICD-10-CM

## 2023-10-16 LAB
ALBUMIN UR-MCNC: NEGATIVE MG/DL
APPEARANCE UR: CLEAR
BILIRUB UR QL STRIP: NEGATIVE
COLOR UR AUTO: YELLOW
GLUCOSE UR STRIP-MCNC: NEGATIVE MG/DL
HGB UR QL STRIP: NEGATIVE
KETONES UR STRIP-MCNC: NEGATIVE MG/DL
LEUKOCYTE ESTERASE UR QL STRIP: NEGATIVE
NITRATE UR QL: NEGATIVE
PH UR STRIP: 8 [PH] (ref 5–7)
SP GR UR STRIP: 1.01 (ref 1–1.03)
UROBILINOGEN UR STRIP-ACNC: 0.2 E.U./DL

## 2023-10-16 PROCEDURE — 99214 OFFICE O/P EST MOD 30 MIN: CPT | Performed by: NURSE PRACTITIONER

## 2023-10-16 PROCEDURE — G0463 HOSPITAL OUTPT CLINIC VISIT: HCPCS | Mod: 25

## 2023-10-16 PROCEDURE — 81003 URINALYSIS AUTO W/O SCOPE: CPT | Mod: ZL | Performed by: NURSE PRACTITIONER

## 2023-10-16 PROCEDURE — 90686 IIV4 VACC NO PRSV 0.5 ML IM: CPT

## 2023-10-16 PROCEDURE — G0008 ADMIN INFLUENZA VIRUS VAC: HCPCS

## 2023-10-16 ASSESSMENT — ANXIETY QUESTIONNAIRES
GAD7 TOTAL SCORE: 15
7. FEELING AFRAID AS IF SOMETHING AWFUL MIGHT HAPPEN: MORE THAN HALF THE DAYS
5. BEING SO RESTLESS THAT IT IS HARD TO SIT STILL: MORE THAN HALF THE DAYS
4. TROUBLE RELAXING: MORE THAN HALF THE DAYS
IF YOU CHECKED OFF ANY PROBLEMS ON THIS QUESTIONNAIRE, HOW DIFFICULT HAVE THESE PROBLEMS MADE IT FOR YOU TO DO YOUR WORK, TAKE CARE OF THINGS AT HOME, OR GET ALONG WITH OTHER PEOPLE: EXTREMELY DIFFICULT
2. NOT BEING ABLE TO STOP OR CONTROL WORRYING: MORE THAN HALF THE DAYS
6. BECOMING EASILY ANNOYED OR IRRITABLE: MORE THAN HALF THE DAYS
3. WORRYING TOO MUCH ABOUT DIFFERENT THINGS: MORE THAN HALF THE DAYS
1. FEELING NERVOUS, ANXIOUS, OR ON EDGE: NEARLY EVERY DAY
GAD7 TOTAL SCORE: 15

## 2023-10-16 ASSESSMENT — PATIENT HEALTH QUESTIONNAIRE - PHQ9
SUM OF ALL RESPONSES TO PHQ QUESTIONS 1-9: 17
10. IF YOU CHECKED OFF ANY PROBLEMS, HOW DIFFICULT HAVE THESE PROBLEMS MADE IT FOR YOU TO DO YOUR WORK, TAKE CARE OF THINGS AT HOME, OR GET ALONG WITH OTHER PEOPLE: EXTREMELY DIFFICULT
SUM OF ALL RESPONSES TO PHQ QUESTIONS 1-9: 17

## 2023-10-16 ASSESSMENT — PAIN SCALES - GENERAL: PAINLEVEL: NO PAIN (0)

## 2023-11-03 ENCOUNTER — OFFICE VISIT (OUTPATIENT)
Dept: FAMILY MEDICINE | Facility: OTHER | Age: 23
End: 2023-11-03
Attending: NURSE PRACTITIONER
Payer: COMMERCIAL

## 2023-11-03 ENCOUNTER — TELEPHONE (OUTPATIENT)
Dept: FAMILY MEDICINE | Facility: OTHER | Age: 23
End: 2023-11-03

## 2023-11-03 VITALS
WEIGHT: 163.3 LBS | RESPIRATION RATE: 16 BRPM | HEIGHT: 65 IN | DIASTOLIC BLOOD PRESSURE: 72 MMHG | HEART RATE: 92 BPM | TEMPERATURE: 98.1 F | OXYGEN SATURATION: 99 % | BODY MASS INDEX: 27.21 KG/M2 | SYSTOLIC BLOOD PRESSURE: 126 MMHG

## 2023-11-03 DIAGNOSIS — H65.193 OTHER NON-RECURRENT ACUTE NONSUPPURATIVE OTITIS MEDIA OF BOTH EARS: Primary | ICD-10-CM

## 2023-11-03 DIAGNOSIS — L84 CORN OF FOOT: ICD-10-CM

## 2023-11-03 PROCEDURE — G0463 HOSPITAL OUTPT CLINIC VISIT: HCPCS

## 2023-11-03 PROCEDURE — 99213 OFFICE O/P EST LOW 20 MIN: CPT | Performed by: NURSE PRACTITIONER

## 2023-11-03 RX ORDER — AMOXICILLIN 500 MG/1
500 CAPSULE ORAL 3 TIMES DAILY
Qty: 30 CAPSULE | Refills: 0 | Status: SHIPPED | OUTPATIENT
Start: 2023-11-03 | End: 2023-11-03

## 2023-11-03 RX ORDER — NEOMYCIN SULFATE, POLYMYXIN B SULFATE AND HYDROCORTISONE 10; 3.5; 1 MG/ML; MG/ML; [USP'U]/ML
3 SUSPENSION/ DROPS AURICULAR (OTIC) 4 TIMES DAILY
Qty: 10 ML | Refills: 0 | Status: SHIPPED | OUTPATIENT
Start: 2023-11-03 | End: 2023-11-10

## 2023-11-03 RX ORDER — CEFDINIR 300 MG/1
300 CAPSULE ORAL 2 TIMES DAILY
Qty: 20 CAPSULE | Refills: 0 | Status: SHIPPED | OUTPATIENT
Start: 2023-11-03 | End: 2023-11-13

## 2023-11-03 ASSESSMENT — PAIN SCALES - GENERAL: PAINLEVEL: SEVERE PAIN (6)

## 2023-11-03 NOTE — TELEPHONE ENCOUNTER
3:19 PM    Reason for Call: Phone Call    Description: Patient found out she is allergic to amoxicillin. Please prescribe something else.     Preferred method for responding to this message: Telephone Call  What is your phone number [phone]    If we cannot reach you directly, may we leave a detailed response at the number you provided? Yes    Can this message wait until your PCP/provider returns, if available today? Not applicable    Charlene Chiang

## 2023-11-03 NOTE — PATIENT INSTRUCTIONS
Assessment & Plan     1. Other non-recurrent acute nonsuppurative otitis media of both ears  Tylenol or ibuprofen per package directions  Warm compress  Consider zyrtec (generic) or flonase  - amoxicillin (AMOXIL) 500 MG capsule; Take 1 capsule (500 mg) by mouth 3 times daily for 10 days  Dispense: 30 capsule; Refill: 0  - neomycin-polymyxin-hydrocortisone (CORTISPORIN) 3.5-14701-2 otic suspension; Place 3 drops into both ears 4 times daily for 7 days  Dispense: 10 mL; Refill: 0    2. Jones of foot  Referral to Dr Veras  - Orthopedic  Referral; Future      Follow-up if no improvement or any worsening     Tiara Hidalgo, NP  [facility]        Psychologists/ Counselors                        [place]  [place]          ...            ..[phone]  [facility]                    ..  [phone]  [facility]                 .[phone]  [facility] (kids)       .         [phone]  [employer](teens)                ..[phone]  [facility]                    [phone]  [facility]                   [phone]  [facility]           ...      .. [phone]  [facility]                ...[phone]  [facility]     ...          ...[phone]  [facility]               [phone]   [facility]..            .[phone]  [facility]                       .[phone]  [facility]               .    [phone]  [facility]                ..[phone]  [facility]     .      . ..[phone]  [facility].....................................................................................[phone]     [facility]              .   .[phone]  [facility]                   .[phone]              [facility]              .   [phone]  [facility]              .  ...[phone]  [facility]              .   ..[phone]  [facility]           ...      .. [phone]  [facility]              . ..[phone]  [facility]              .    ..[phone]  [facility]              .     .. [phone]  [facility]                 ... [phone]  [facility]                         .[phone]  [facility]     .      . ..[phone]  [facility]...............................................................[phone]  [facility].....................................................................................[phone]              [facility]              ..  [phone]                 [facility]                                                                   [phone]  [facility]             . ... ..[phone]  Ashia Schaeffer              .     ..[phone]  [facility]        .      . [phone]  [facility]           .[phone]  [facility]         .    ..[phone]  [employer]             . ..  ..  [phone]  [facility]            [phone]  [facility]              ..[phone]  [facility]                     [phone]  [facility]........................................................................[phone]  [facility]...........................................................[phone]  [facility]...................................................................[phone]  [facility].......................................[phone]  [facility]......................................................................[phone]  [facility]....................................................................................[phone]       [facility]     ...     ...[phone]     Elena  [facility]            [phone]                                                  Leonora Bautistaen                ..  .  [phone]  [facility]         .     .  [phone]  [facility] Dr. MIHAELA Alexandra              . [phone]  [facility]  ..        [phone]  [facility]              .    [phone]    [facility]               ...  .  [phone]  [facility]             . [phone]  [facility]            [phone]  [facility]     .      . ..[phone]               [facility]....................................................[phone]          *Facilities in bold italics indicate medication management  Services are offered.     Crisis support    If you or someone you know is struggling or in crisis, help is available. Call or text 494 or chat [email or web address]    The volunteer Crisis Counselor will help you move from a 'hot moment to a cool moment'

## 2023-11-06 ENCOUNTER — TELEPHONE (OUTPATIENT)
Dept: FAMILY MEDICINE | Facility: OTHER | Age: 23
End: 2023-11-06

## 2023-11-06 NOTE — TELEPHONE ENCOUNTER
3:23 PM    Reason for Call: OVERBOOK    Patient is having the following symptoms: swollen ankle / feels tingly for 1 days. And the advair pt takes is causing discomfort in chest when she uses.    The patient is requesting an appointment for soon with any PCP in [facility].    Was an appointment offered for this call? No  If yes : Appointment type              Date    Preferred method for responding to this message: Telephone Call  What is your phone number ? [phone]     If we cannot reach you directly, may we leave a detailed response at the number you provided? Yes    Can this message wait until your PCP/provider returns, if unavailable today? Sonia Delacruz

## 2023-11-07 NOTE — TELEPHONE ENCOUNTER
Attempt # 1  Outcome: Left Message   Comment: left message to call and schedule/ see message below

## 2023-11-08 NOTE — TELEPHONE ENCOUNTER
Pt unable to make appointment today, would like either Thursday or Friday. Please call pt to schedule. [phone]

## 2023-11-15 ENCOUNTER — OFFICE VISIT (OUTPATIENT)
Dept: PODIATRY | Facility: OTHER | Age: 23
End: 2023-11-15
Attending: PODIATRIST
Payer: COMMERCIAL

## 2023-11-15 VITALS
OXYGEN SATURATION: 98 % | SYSTOLIC BLOOD PRESSURE: 105 MMHG | DIASTOLIC BLOOD PRESSURE: 60 MMHG | TEMPERATURE: 97.1 F | HEART RATE: 85 BPM

## 2023-11-15 DIAGNOSIS — M20.11 HALLUX VALGUS (ACQUIRED), RIGHT FOOT: ICD-10-CM

## 2023-11-15 DIAGNOSIS — M21.42 PES PLANUS OF BOTH FEET: ICD-10-CM

## 2023-11-15 DIAGNOSIS — M21.41 PES PLANUS OF BOTH FEET: ICD-10-CM

## 2023-11-15 DIAGNOSIS — M20.12 HALLUX VALGUS (ACQUIRED), LEFT FOOT: ICD-10-CM

## 2023-11-15 DIAGNOSIS — L84 CORN OF TOE: Primary | ICD-10-CM

## 2023-11-15 PROCEDURE — G0463 HOSPITAL OUTPT CLINIC VISIT: HCPCS

## 2023-11-15 PROCEDURE — 99203 OFFICE O/P NEW LOW 30 MIN: CPT | Performed by: PODIATRIST

## 2023-11-15 ASSESSMENT — PAIN SCALES - GENERAL: PAINLEVEL: MODERATE PAIN (4)

## 2023-11-15 NOTE — PROGRESS NOTES
Chief complaint: Patient presents with:  Musculoskeletal Problem: Corn of left foot      History of Present Illness: This 23 year old female is seen at the request of No ref. provider found for evaluation and suggestions of management of a soft tissue mass on her LEFT foot. She noticed a little bump on her LEFT second to over a month ago (early October, 2023), and the area started to cause more pain over the last couple of weeks. She tried putting a Dr. Gillespie pad and the toe peeled a lot of skin off the toe. She still has a bump remaining, so she is wondering how to get rid of the remaining bump.    Additionally, she says she sometimes has pain from her bilateral bunions. She has noticed them both getting larger. She is wondering how to treat the bunions and/or stop the progression of the bunions.    No further pedal complaints today.     /60 (BP Location: Left arm, Patient Position: Sitting, Cuff Size: Adult Regular)   Pulse 85   Temp 97.1  F (36.2  C) (Tympanic)   LMP 10/23/2023 (Approximate)   SpO2 98%     Patient Active Problem List   Diagnosis    Major depressive disorder, recurrent episode, moderate (H)    Anxiety state    Bipolar disorder, unspecified (H)    Mild intellectual disabilities    Mild intermittent asthma without complication       Past Surgical History:   Procedure Laterality Date    HEAD & NECK SURGERY      laser surgery to birthmark on left side of face    LAPAROSCOPIC CYSTECTOMY OVARIAN (ONCOLOGY) Left 08/22/2019    Procedure: LAPAROSCOPY TREAT LEFT OVARIAN CYST, MULTIPLE ASPIRATIONS LEFT OVARIAN CYST;  Surgeon: Frow, David Franke, MD;  Location: [facility]       Current Outpatient Medications   Medication    acetaminophen (TYLENOL) 500 MG tablet    albuterol (ACCUNEB) 1.25 MG/3ML neb solution    albuterol (PROAIR HFA/PROVENTIL HFA/VENTOLIN HFA) 108 (90 Base) MCG/ACT inhaler    busPIRone (BUSPAR) 5 MG tablet    docusate sodium (COLACE) 100 MG capsule    fluticasone-salmeterol (ADVAIR HFA)  115-21 MCG/ACT inhaler    ibuprofen (ADVIL/MOTRIN) 800 MG tablet    LORazepam (ATIVAN) 0.5 MG tablet    norgestimate-ethinyl estradiol (ORTHO-CYCLEN) 0.25-35 MG-MCG tablet    nystatin-triamcinolone (MYCOLOG II) 364109-0.1 UNIT/GM-% external cream    Prenatal Vit-Fe Fumarate-FA (PRENATAL MULTIVITAMIN W/IRON) 27-0.8 MG tablet    sertraline (ZOLOFT) 25 MG tablet    VYVANSE 20 MG capsule     No current facility-administered medications for this visit.          Allergies   Allergen Reactions    Duloxetine Hcl     Lexapro [Escitalopram] Other (See Comments)     hypersensitivity to touch     Duloxetine Other (See Comments)       Family History   Problem Relation Age of Onset    Bipolar Disorder Mother     Depression Mother     Heart Disease Father     Diabetes Other     Myocardial Infarction Paternal Grandfather     Lung Cancer Paternal Grandfather     Other - See Comments Paternal Aunt         mulitiple sclerosis    Breast Cancer Maternal Grandfather         x 2       Social History     Socioeconomic History    Marital status: Single   Tobacco Use    Smoking status: Never    Smokeless tobacco: Never   Vaping Use    Vaping Use: Never used   Substance and Sexual Activity    Alcohol use: No    Drug use: No    Sexual activity: Yes     Partners: Male     Birth control/protection: None   Social History Narrative    Parent relationship     Language spoken english    Primary residence The patient lives with mother.    School name [employer]    Grade level fifth     Performance A's and B's     Social Determinants of Health     Financial Resource Strain: Low Risk  (10/4/2023)    Financial Resource Strain     Within the past 12 months, have you or your family members you live with been unable to get utilities (heat, electricity) when it was really needed?: No   Food Insecurity: High Risk (10/4/2023)    Food Insecurity     Within the past 12 months, did you worry that your food would run out before you got money to buy  more?: Yes     Within the past 12 months, did the food you bought just not last and you didn t have money to get more?: Yes   Transportation Needs: High Risk (10/4/2023)    Transportation Needs     Within the past 12 months, has lack of transportation kept you from medical appointments, getting your medicines, non-medical meetings or appointments, work, or from getting things that you need?: Yes   Interpersonal Safety: Low Risk  (10/4/2023)    Interpersonal Safety     Do you feel physically and emotionally safe where you currently live?: Yes     Within the past 12 months, have you been hit, slapped, kicked or otherwise physically hurt by someone?: No     Within the past 12 months, have you been humiliated or emotionally abused in other ways by your partner or ex-partner?: No   Housing Stability: Low Risk  (10/4/2023)    Housing Stability     Do you have housing? : Yes     Are you worried about losing your housing?: No       ROS: 10 point ROS neg other than the symptoms noted above in the HPI.  EXAM  Constitutional: healthy, alert, and no distress    Psychiatric: mentation appears normal and affect normal/bright    VASCULAR:  -Dorsalis pedis pulse +2/4 b/l  -Posterior tibial pulse +2/4 b/l  -Capillary refill time < 3 seconds to b/l hallux  NEURO:  -Light touch sensation intact to b/l plantar forefoot  DERM:  -Skin temperature, texture and turgor WNL b/l  -Hyperkeratotic lesion on the LEFT medial second toe DIPJ  MSK:  -Pain on palpation to the LEFT medial second toe DIPJ hyperkeratotic lesion   -Lateral deviation of hallux with medial deviation of 1st metatarsal, bilaterally   -Prominent bony prominence to dorsal and medial 1st metatarsal head, bilaterally   -Moderate decrease in arch height while patient is NWB, bilaterally   -Muscle strength of ankles +5/5 for dorsiflexion, plantarflexion, ABDUction and ADDuction b/l    ============================================================    ASSESSMENT:  (L84) Corn of toe   (primary encounter diagnosis)    (M20.11) Hallux valgus (acquired), right foot    (M20.12) Hallux valgus (acquired), left foot    (M21.41,  M21.42) Pes planus of both feet        PLAN:  -Patient evaluated and examined. Treatment options discussed with no educational barriers noted.  -Pared the callus on the LEFT distal medial toe DIPJ -- pain relief post paring  ---Dispensed size small toe sleeve. Patient may find these at [employer] [facility]) or [facility]. Patient should not wear the toe sleeve(s) at night, but only wear the sleeve in shoes and/or socks during the day. The sleeves are re-usable and hand washable until they wear out.    Calluses between the toes will continue to come back due to the pressure from the second toe on the hallux. Wearing a toe sleeve every day will minimize the pain.    Hallux valgus / bunions:   -Discussed causes and treatment options for bunion deformities:  ---Conservative treatment options consist of wider shoe gear and orthotics +/- padding/splinting to accommodate the bunion. A crest pad can hold down a dorsiflexed second digit, a toe spacer can help separate the hallux and second digit, and silicone bunion sleeve can help pad the bunion and prevent painful rubbing in shoe gear. This will not correct the bunion deformity, but may help decrease pain.  ---Correcting the biomechanics of the foot may also decrease the progression of a bunion. An orthotic may be considered in attempt to treat the biomechanics of the foot (Patient has a bilateral pes planus which can contribute to the progression of a bunion).     ---Discussed surgical treatment options including risks and benefits and post-op periods. Surgery should not be considered until the patient is experiencing daily pain that is limiting daily activities because of the bunion. Most surgeries required at least 6 weeks of NWB. A bumpectomy can be performed which requires less offloading with recovery. It does not fix the bunion but does reduce  the prominent bump.    ---Discussed conservative treatment options which should be exhausted first:    -Stability Shoe Gear: This involves wearing a solid tennis shoe that bends at the toe, but has a solid midfoot portion of the shoe that does not bend or twist in half, and a rigid heel contour.   -----[place], Asics, and New Balance are a few brands that have several types of stability tennis shoes. However, these brands also carry lightweight shoes that do not meet the above criteria, so look for a stability tennis shoe.  -----[place] tend to have a wider toe box if you have difficulty finding wide enough shoes for your feet.   -----Any brand of can be worn as long as it meets the above three criteria.  -----Rigid shoes will prevent abnormal biomechanics of the foot and may slow the progression of the bunion. Supportive orthotics will further reduce the pressure on the bunion.    -Custom inserts ordered on 11/15/2023 today through the orthotist, Sheridan Skinner, per patient request. This will not correct the bunions, but correcting the biomechanics may slow the progression of the bunions.    -This is an acute, uncomplicated illness/injury with OTC treatment options reviewed.    -Patient in agreement with the above treatment plan and all of patient's questions were answered.      Return to clinic 2-3 months to evaluate LEFT medial second toe pain and bilateral bunion pain        Dyana Ingram DPM

## 2023-11-15 NOTE — PATIENT INSTRUCTIONS
-Stability Shoe Gear: This involves wearing a solid tennis shoe that bends at the toe, but has a solid midfoot portion of the shoe that does not bend or twist in half, and a rigid heel contour.   -----[place], Asics, and New Balance are a few brands that have several types of stability tennis shoes. However, these brands also carry lightweight shoes that do not meet the above criteria, so look for a stability tennis shoe.  -----[place] tend to have a wider toe box if you have difficulty finding wide enough shoes for your feet.   -----Any brand of can be worn as long as it meets the above three criteria.    -Custom inserts will be ordered today -- you will be called or scheduled this appointment      ---Look for a size small toe sleeve. You may find these at [facility] or [facility]. Do not wear the toe sleeve(s) at night, but only wear the sleeve in shoes and/or socks during the day. The sleeves are re-usable and hand washable until they wear out.

## 2023-11-18 ENCOUNTER — MEDICAL CORRESPONDENCE (OUTPATIENT)
Dept: HEALTH INFORMATION MANAGEMENT | Facility: HOSPITAL | Age: 23
End: 2023-11-18

## 2023-11-28 ENCOUNTER — TELEPHONE (OUTPATIENT)
Dept: FAMILY MEDICINE | Facility: OTHER | Age: 23
End: 2023-11-28

## 2023-11-28 NOTE — TELEPHONE ENCOUNTER
Patient ok with follow up on 11/29/23 has been having high blood pressure 159/88.  Still feels kind of shaky and off from yesterday.  Did not any changes to go to ER.  Patient did stop taking medication for about 10 days.  Then started them again thinks maybe Vyvanse is what is causing her symptoms she is taking 20 mg.   Did not take today. Will hold off on taking till talks to pcp

## 2023-11-28 NOTE — TELEPHONE ENCOUNTER
8:48 AM    Reason for Call: Phone Call    Description: Patient called in stating that she wanted to get in sooner than Thursday to see a provider as she was still having chest pressure and didn't feel right. After speaking with the nurse, the patient was advised to go to the ER if she still had chest pressure. Her appointment was changed to 11-29-23 with Bulmaro Marcelino. Please call patient back.     Was an appointment offered for this call? No  If yes : Appointment type              Date    Preferred method for responding to this message: Telephone Call  What is your phone number ? [phone]     If we cannot reach you directly, may we leave a detailed response at the number you provided? Yes    Can this message wait until your PCP/provider returns, if available today? Sonia Santos

## 2023-11-28 NOTE — PROGRESS NOTES
"  Assessment & Plan     1. Syncope, unspecified syncope type  ED notes reviewed   - Adult Leadless EKG Monitor 8 to 14 Days; Future    2. Hypokalemia  Repeat potassium today.   - Basic metabolic panel; Future    3. Mild intermittent asthma without complication  Stop advair.  Continue albuterol as needed     4. Eustachian tube dysfunction, bilateral  - Adult ENT  Referral; Future  - ciprofloxacin-dexAMETHasone (CIPRODEX) 0.3-0.1 % otic suspension; Place 4 drops into both ears 2 times daily for 7 days  Dispense: 7.5 mL; Refill: 0    ED notes reviewed.        MED REC REQUIRED  Post Medication Reconciliation Status: discharge medications reconciled, continue medications without change  BMI:   Estimated body mass index is 26.66 kg/m  as calculated from the following:    Height as of this encounter: 1.651 m (5' 5\").    Weight as of this encounter: 72.7 kg (160 lb 3.2 oz).   0956}    Will notify of results as they are returned.     Tiara Hidalgo NP  [facility]    Darnell Weaver is a 23 year old, presenting for the following health issues:  ER F/U      HPI     ED/UC Followup:    Facility:  [facility]   Date of visit: 11/27/23  Reason for visit: Chest Pressure Fainting   Current Status: still having some chest pain and dizziness at times    She was at work, got dizzy and \"passed out\" came to after a bit and had co-worker check blood pressure and was very high.  She went to ED, notes and labs reviewed.  She is not feeling back to baseline as yet.  Has continue cheest pain and flutters      Has not restarted taking her psych medications.  She has a follow-up with her psychiatrist on 12/4/20236.        Review of Systems   Constitutional, HEENT, cardiovascular, pulmonary, gi and gu systems are negative, except as otherwise noted.      Objective    /70 (BP Location: Left arm, Patient Position: Sitting, Cuff Size: Adult Regular)   Pulse 104   Temp 97.5  F (36.4  C) " "(Tympanic)   Resp 16   Ht 1.651 m (5' 5\")   Wt 72.7 kg (160 lb 3.2 oz)   LMP 10/23/2023 (Approximate)   SpO2 99%   BMI 26.66 kg/m    Body mass index is 26.66 kg/m .  Physical Exam   GENERAL: healthy, alert and no distress  HENT: ear canals and TM's normal, nose and mouth without ulcers or lesions  NECK: no adenopathy, no asymmetry, masses, or scars and thyroid normal to palpation  RESP: lungs clear to auscultation - no rales, rhonchi or wheezes  CV: regular rate and rhythm, normal S1 S2, no S3 or S4, no murmur  MS: no gross musculoskeletal defects noted, no edema  PSYCH: mentation appears normal, affect normal/bright                      Answers submitted by the patient for this visit:  Patient Health Questionnaire (Submitted on 11/29/2023)  If you checked off any problems, how difficult have these problems made it for you to do your work, take care of things at home, or get along with other people?: Extremely difficult  PHQ9 TOTAL SCORE: 25  RUFINA-7 (Submitted on 11/29/2023)  RUFINA 7 TOTAL SCORE: 21    "

## 2023-11-28 NOTE — TELEPHONE ENCOUNTER
ER F/U  11/27/23  [facility]     Chest pressure and hypertension      CHIEF COMPLAINT: Chest pain and dizziness    HISTORY OF PRESENT ILLNESS: Patient is a 23-year-old female presents the ER with her coworker. Patient claims in the middle the night she started to get lightheaded. She was not sure why she felt like it was hard to breathe at times. Throughout the morning she started Velp chest pressure. She says worse when she took a deep breath she did get a paresthesias in her both her feet and then going to her hands which she said was hard to on clench her fists. She felt like she was getting muscle spasms in her fingers. Denies any nausea vomiting. She claims she was off her medications that she takes for behavioral health concerns for several days due to antibiotic use and then started them all back 5 days ago. She said it is hard to concentrate. Denies any syncope. Denies any nausea vomiting or diarrhea. Denies any cough or fevers or chills. Denies pregnancy. Denies smoking or illegal substance use. Claims she is compliant with medications like she should but has not felt this way in the past. She gets all her medications through her psychiatrist at [place].    PAST MEDICAL HISTORY: Reviewed medical records    PAST SURGICAL HISTORY: Reviewed medical records

## 2023-11-29 ENCOUNTER — OFFICE VISIT (OUTPATIENT)
Dept: FAMILY MEDICINE | Facility: OTHER | Age: 23
End: 2023-11-29
Attending: NURSE PRACTITIONER
Payer: COMMERCIAL

## 2023-11-29 VITALS
DIASTOLIC BLOOD PRESSURE: 70 MMHG | TEMPERATURE: 97.5 F | HEART RATE: 104 BPM | WEIGHT: 160.2 LBS | OXYGEN SATURATION: 99 % | SYSTOLIC BLOOD PRESSURE: 118 MMHG | HEIGHT: 65 IN | RESPIRATION RATE: 16 BRPM | BODY MASS INDEX: 26.69 KG/M2

## 2023-11-29 DIAGNOSIS — R55 SYNCOPE, UNSPECIFIED SYNCOPE TYPE: Primary | ICD-10-CM

## 2023-11-29 DIAGNOSIS — H69.93 EUSTACHIAN TUBE DYSFUNCTION, BILATERAL: Primary | ICD-10-CM

## 2023-11-29 DIAGNOSIS — H69.93 EUSTACHIAN TUBE DYSFUNCTION, BILATERAL: ICD-10-CM

## 2023-11-29 DIAGNOSIS — J45.20 MILD INTERMITTENT ASTHMA WITHOUT COMPLICATION: ICD-10-CM

## 2023-11-29 DIAGNOSIS — E87.6 HYPOKALEMIA: ICD-10-CM

## 2023-11-29 LAB
ANION GAP SERPL CALCULATED.3IONS-SCNC: 11 MMOL/L (ref 7–15)
BUN SERPL-MCNC: 10.8 MG/DL (ref 6–20)
CALCIUM SERPL-MCNC: 9.3 MG/DL (ref 8.6–10)
CHLORIDE SERPL-SCNC: 104 MMOL/L (ref 98–107)
CREAT SERPL-MCNC: 0.66 MG/DL (ref 0.51–0.95)
DEPRECATED HCO3 PLAS-SCNC: 22 MMOL/L (ref 22–29)
EGFRCR SERPLBLD CKD-EPI 2021: >90 ML/MIN/1.73M2
GLUCOSE SERPL-MCNC: 112 MG/DL (ref 70–99)
HOLD SPECIMEN: NORMAL
POTASSIUM SERPL-SCNC: 4 MMOL/L (ref 3.4–5.3)
SODIUM SERPL-SCNC: 137 MMOL/L (ref 135–145)

## 2023-11-29 PROCEDURE — 99214 OFFICE O/P EST MOD 30 MIN: CPT | Performed by: NURSE PRACTITIONER

## 2023-11-29 PROCEDURE — 80048 BASIC METABOLIC PNL TOTAL CA: CPT | Mod: ZL | Performed by: NURSE PRACTITIONER

## 2023-11-29 PROCEDURE — G0463 HOSPITAL OUTPT CLINIC VISIT: HCPCS

## 2023-11-29 PROCEDURE — 36415 COLL VENOUS BLD VENIPUNCTURE: CPT | Mod: ZL | Performed by: NURSE PRACTITIONER

## 2023-11-29 RX ORDER — CIPROFLOXACIN AND DEXAMETHASONE 3; 1 MG/ML; MG/ML
4 SUSPENSION/ DROPS AURICULAR (OTIC) 2 TIMES DAILY
Qty: 7.5 ML | Refills: 0 | Status: SHIPPED | OUTPATIENT
Start: 2023-11-29 | End: 2023-11-30

## 2023-11-29 ASSESSMENT — ASTHMA QUESTIONNAIRES
QUESTION_5 LAST FOUR WEEKS HOW WOULD YOU RATE YOUR ASTHMA CONTROL: SOMEWHAT CONTROLLED
ACT_TOTALSCORE: 10
QUESTION_2 LAST FOUR WEEKS HOW OFTEN HAVE YOU HAD SHORTNESS OF BREATH: MORE THAN ONCE A DAY
QUESTION_4 LAST FOUR WEEKS HOW OFTEN HAVE YOU USED YOUR RESCUE INHALER OR NEBULIZER MEDICATION (SUCH AS ALBUTEROL): ONE OR TWO TIMES PER DAY
QUESTION_3 LAST FOUR WEEKS HOW OFTEN DID YOUR ASTHMA SYMPTOMS (WHEEZING, COUGHING, SHORTNESS OF BREATH, CHEST TIGHTNESS OR PAIN) WAKE YOU UP AT NIGHT OR EARLIER THAN USUAL IN THE MORNING: TWO OR THREE NIGHTS A WEEK
QUESTION_1 LAST FOUR WEEKS HOW MUCH OF THE TIME DID YOUR ASTHMA KEEP YOU FROM GETTING AS MUCH DONE AT WORK, SCHOOL OR AT HOME: MOST OF THE TIME
ACT_TOTALSCORE: 10

## 2023-11-29 ASSESSMENT — ANXIETY QUESTIONNAIRES
GAD7 TOTAL SCORE: 21
3. WORRYING TOO MUCH ABOUT DIFFERENT THINGS: NEARLY EVERY DAY
GAD7 TOTAL SCORE: 21
GAD7 TOTAL SCORE: 21
6. BECOMING EASILY ANNOYED OR IRRITABLE: NEARLY EVERY DAY
7. FEELING AFRAID AS IF SOMETHING AWFUL MIGHT HAPPEN: NEARLY EVERY DAY
4. TROUBLE RELAXING: NEARLY EVERY DAY
5. BEING SO RESTLESS THAT IT IS HARD TO SIT STILL: NEARLY EVERY DAY
8. IF YOU CHECKED OFF ANY PROBLEMS, HOW DIFFICULT HAVE THESE MADE IT FOR YOU TO DO YOUR WORK, TAKE CARE OF THINGS AT HOME, OR GET ALONG WITH OTHER PEOPLE?: EXTREMELY DIFFICULT
IF YOU CHECKED OFF ANY PROBLEMS ON THIS QUESTIONNAIRE, HOW DIFFICULT HAVE THESE PROBLEMS MADE IT FOR YOU TO DO YOUR WORK, TAKE CARE OF THINGS AT HOME, OR GET ALONG WITH OTHER PEOPLE: EXTREMELY DIFFICULT
7. FEELING AFRAID AS IF SOMETHING AWFUL MIGHT HAPPEN: NEARLY EVERY DAY
2. NOT BEING ABLE TO STOP OR CONTROL WORRYING: NEARLY EVERY DAY
1. FEELING NERVOUS, ANXIOUS, OR ON EDGE: NEARLY EVERY DAY

## 2023-11-29 ASSESSMENT — PATIENT HEALTH QUESTIONNAIRE - PHQ9
SUM OF ALL RESPONSES TO PHQ QUESTIONS 1-9: 25
10. IF YOU CHECKED OFF ANY PROBLEMS, HOW DIFFICULT HAVE THESE PROBLEMS MADE IT FOR YOU TO DO YOUR WORK, TAKE CARE OF THINGS AT HOME, OR GET ALONG WITH OTHER PEOPLE: EXTREMELY DIFFICULT
SUM OF ALL RESPONSES TO PHQ QUESTIONS 1-9: 25

## 2023-11-29 ASSESSMENT — PAIN SCALES - GENERAL: PAINLEVEL: MODERATE PAIN (5)

## 2023-11-29 NOTE — PATIENT INSTRUCTIONS
Assessment & Plan     1. Syncope, unspecified syncope type  ED notes reviewed   - Adult Leadless EKG Monitor 8 to 14 Days; Future    2. Hypokalemia  Repeat potassium today.   - Basic metabolic panel; Future    3. Mild intermittent asthma without complication  Stop advair.  Continue albuterol as needed     4. Eustachian tube dysfunction, bilateral  - Adult ENT  Referral; Future  - ciprofloxacin-dexAMETHasone (CIPRODEX) 0.3-0.1 % otic suspension; Place 4 drops into both ears 2 times daily for 7 days  Dispense: 7.5 mL; Refill: 0    Follow-up with mental health as scheduled    Tiara Hidalgo,   Certified Adult Nurse Practitioner  [phone]

## 2023-11-30 ENCOUNTER — TELEPHONE (OUTPATIENT)
Dept: FAMILY MEDICINE | Facility: OTHER | Age: 23
End: 2023-11-30

## 2023-11-30 DIAGNOSIS — H69.93 EUSTACHIAN TUBE DYSFUNCTION, BILATERAL: Primary | ICD-10-CM

## 2023-11-30 RX ORDER — NEOMYCIN SULFATE, POLYMYXIN B SULFATE AND HYDROCORTISONE 10; 3.5; 1 MG/ML; MG/ML; [USP'U]/ML
3 SUSPENSION/ DROPS AURICULAR (OTIC) 4 TIMES DAILY
Qty: 10 ML | Refills: 0 | Status: SHIPPED | OUTPATIENT
Start: 2023-11-30 | End: 2023-12-07

## 2023-11-30 NOTE — TELEPHONE ENCOUNTER
12:00 PM    Reason for Call: Phone Call    Description: Wilmer from the [facility] in [place] called to speak with a Chari about changing the pts prescription, ciprofloxacin-dexAMETHasone, the pharmacy stated they are out of the generic could they use a solution instead    Was an appointment offered for this call? No  If yes : Appointment type              Date    Preferred method for responding to this message: Telephone Call  What is your phone number ? [phone]    If we cannot reach you directly, may we leave a detailed response at the number you provided? No    Can this message wait until your PCP/provider returns, if available today? Not applicable    Hamida Phelps

## 2023-11-30 NOTE — TELEPHONE ENCOUNTER
Pt's insurance only covers Brand Name Ciprodex, which has been discontinued.  Pharmacy asking for alternative.

## 2023-12-01 ENCOUNTER — HOSPITAL ENCOUNTER (OUTPATIENT)
Dept: CARDIOLOGY | Facility: HOSPITAL | Age: 23
Discharge: HOME | End: 2023-12-01
Attending: NURSE PRACTITIONER
Payer: COMMERCIAL

## 2023-12-01 ENCOUNTER — OFFICE VISIT (OUTPATIENT)
Dept: AUDIOLOGY | Facility: OTHER | Age: 23
End: 2023-12-01
Attending: AUDIOLOGIST
Payer: COMMERCIAL

## 2023-12-01 DIAGNOSIS — R55 SYNCOPE, UNSPECIFIED SYNCOPE TYPE: ICD-10-CM

## 2023-12-01 DIAGNOSIS — H69.93 EUSTACHIAN TUBE DYSFUNCTION, BILATERAL: ICD-10-CM

## 2023-12-01 PROCEDURE — 93248 EXT ECG>7D<15D REV&INTERPJ: CPT | Performed by: INTERNAL MEDICINE

## 2023-12-01 PROCEDURE — 92552 PURE TONE AUDIOMETRY AIR: CPT | Performed by: AUDIOLOGIST

## 2023-12-01 PROCEDURE — 92556 SPEECH AUDIOMETRY COMPLETE: CPT | Performed by: AUDIOLOGIST

## 2023-12-01 PROCEDURE — 92567 TYMPANOMETRY: CPT | Performed by: AUDIOLOGIST

## 2023-12-01 PROCEDURE — 93246 EXT ECG>7D<15D RECORDING: CPT

## 2023-12-01 NOTE — PROGRESS NOTES
Audiology Evaluation Completed. Please refer SCANNED AUDIOGRAM and/or TYMPANOGRAM for BACKGROUND, RESULTS, RECOMMENDATIONS.      Lila ALEXANDER, [facility]  Audiologist #[id number]

## 2024-01-12 ENCOUNTER — OFFICE VISIT (OUTPATIENT)
Dept: OTOLARYNGOLOGY | Facility: OTHER | Age: 24
End: 2024-01-12
Attending: NURSE PRACTITIONER
Payer: COMMERCIAL

## 2024-01-12 VITALS
TEMPERATURE: 97.3 F | WEIGHT: 148 LBS | OXYGEN SATURATION: 99 % | DIASTOLIC BLOOD PRESSURE: 62 MMHG | BODY MASS INDEX: 24.63 KG/M2 | HEART RATE: 73 BPM | SYSTOLIC BLOOD PRESSURE: 116 MMHG

## 2024-01-12 DIAGNOSIS — Z59.82 TRANSPORTATION INSECURITY: ICD-10-CM

## 2024-01-12 DIAGNOSIS — J30.2 SEASONAL ALLERGIC RHINITIS, UNSPECIFIED TRIGGER: ICD-10-CM

## 2024-01-12 DIAGNOSIS — M26.623 TMJ TENDERNESS, BILATERAL: ICD-10-CM

## 2024-01-12 DIAGNOSIS — F41.9 ANXIETY: ICD-10-CM

## 2024-01-12 DIAGNOSIS — S16.1XXA STRAIN OF NECK MUSCLE, INITIAL ENCOUNTER: ICD-10-CM

## 2024-01-12 DIAGNOSIS — H92.03 REFERRED OTALGIA OF BOTH EARS: Primary | ICD-10-CM

## 2024-01-12 DIAGNOSIS — R68.89 FORGETFULNESS: ICD-10-CM

## 2024-01-12 PROCEDURE — 36415 COLL VENOUS BLD VENIPUNCTURE: CPT | Mod: ZL | Performed by: NURSE PRACTITIONER

## 2024-01-12 PROCEDURE — 99214 OFFICE O/P EST MOD 30 MIN: CPT | Mod: 25 | Performed by: NURSE PRACTITIONER

## 2024-01-12 PROCEDURE — 92504 EAR MICROSCOPY EXAMINATION: CPT | Performed by: NURSE PRACTITIONER

## 2024-01-12 PROCEDURE — G0463 HOSPITAL OUTPT CLINIC VISIT: HCPCS

## 2024-01-12 PROCEDURE — 86003 ALLG SPEC IGE CRUDE XTRC EA: CPT | Mod: ZL | Performed by: NURSE PRACTITIONER

## 2024-01-12 PROCEDURE — 82785 ASSAY OF IGE: CPT | Mod: ZL | Performed by: NURSE PRACTITIONER

## 2024-01-12 RX ORDER — NORTRIPTYLINE HCL 25 MG
25 CAPSULE ORAL AT BEDTIME
Qty: 30 CAPSULE | Refills: 1 | Status: SHIPPED | OUTPATIENT
Start: 2024-01-12 | End: 2024-02-16

## 2024-01-12 SDOH — ECONOMIC STABILITY - TRANSPORTATION SECURITY: TRANSPORTATION INSECURITY: Z59.82

## 2024-01-12 ASSESSMENT — PAIN SCALES - GENERAL: PAINLEVEL: MODERATE PAIN (4)

## 2024-01-12 NOTE — LETTER
"    1/12/2024         RE: Meg Diallo  [address]  [facility] [address]        Dear Colleague,    Thank you for referring your patient, Meg Diallo, to the [facility]. Please see a copy of my visit note below.    Otolaryngology Note         Chief Complaint:     Patient presents with:  Otalgia           History of Present Illness:     Meg Diallo is a 24 year old female seen today for concerns for recurrent ear aches and itching.      She reports symptoms off and on for many years.  The pain is in both ears, some days it hurts worse in right vs left or vice versa.  Pain is back behind the ear also.  Loud noises hurt the ear.  She notes pounding in the ears at times.  Lots of popping in her ears.  She feels that she is very sensitive to loud sounds.       She has a history of migraines.  History of sound sensitivity with migraines.   She continues with migraines at this time.  Pain is in the back of the head and \"everywhere\".  Migraines are as often as weekly or can be up to a month apart.      She also reports headaches and pain from the back of her ears to her neck  She does clench and grind at time.  She has a history of TMJ.  She tried PT for TMJ in the past but states there were too many other things going on at the time and she wasn't able to focus on treatment.      No concerns for decreased hearing  She has a history of recurrent ear pain as a child, she reports every time she was brought in, her ears were fine.    Never smoker   Patient has no history of otological surgeries or procedures  No history of noise exposure    She has have recurrent feeling of off kilter and rotary vertigo at times.  This is newer.  She has been having similar symptoms since she was 17 years old.       Her ears itch all the time and has been present for many years.        She has associated sneezing and eye itching.  She feels she may have some seasonal allergies.  She has taken benadryl " and nasal sprays with improvement with allergy symptoms.  Symptoms seem to be worse no real winter, spring and fall.  She has used an airpurifier without much improvement    She is able to breath through her nose, smell and taste.     Audiogram completed 12/1/2023:  Tympanograms are Type A for both ears suggesting normal eardrum mobility.  Thresholds are normal range both ears.  Speech reception thresholds are in good agreement with pure tone average.  Word discrimination scores are excellent at supra-thresholds level.         Medications:     Current Outpatient Rx   Medication Sig Dispense Refill     acetaminophen (TYLENOL) 500 MG tablet Take 1,000 mg by mouth       albuterol (PROAIR HFA/PROVENTIL HFA/VENTOLIN HFA) 108 (90 Base) MCG/ACT inhaler Inhale 2 puffs into the lungs every 6 hours as needed for shortness of breath or wheezing 18 g 3     docusate sodium (COLACE) 100 MG capsule Take 1 capsule (100 mg) by mouth 2 times daily as needed for constipation 120 capsule 1     LORazepam (ATIVAN) 0.5 MG tablet Take 1 tablet by mouth 2 times daily       nortriptyline (PAMELOR) 25 MG capsule Take 1 capsule (25 mg) by mouth at bedtime for 30 days 30 capsule 1     nystatin-triamcinolone (MYCOLOG II) 608798-3.1 UNIT/GM-% external cream Apply topically 2 times daily as needed (skin irritation) 120 g 1     albuterol (ACCUNEB) 1.25 MG/3ML neb solution Take 1.25 mg by nebulization every 6 hours as needed for shortness of breath, wheezing or cough (Patient not taking: Reported on 1/12/2024)       busPIRone (BUSPAR) 5 MG tablet Take 1 tablet by mouth 2 times daily (Patient not taking: Reported on 1/12/2024)       ibuprofen (ADVIL/MOTRIN) 800 MG tablet Take 800 mg by mouth (Patient not taking: Reported on 1/12/2024)       norgestimate-ethinyl estradiol (ORTHO-CYCLEN) 0.25-35 MG-MCG tablet Take 1 tablet by mouth daily (Patient not taking: Reported on 1/12/2024) 84 tablet 3     Prenatal Vit-Fe Fumarate-FA (PRENATAL MULTIVITAMIN  W/IRON) 27-0.8 MG tablet Take 1 tablet by mouth daily (Patient not taking: Reported on 1/12/2024) 90 tablet 3     sertraline (ZOLOFT) 25 MG tablet Take 1 tablet by mouth daily at 2 pm (Patient not taking: Reported on 1/12/2024)       VYVANSE 20 MG capsule Take 20 mg by mouth every morning (Patient not taking: Reported on 1/12/2024)              Allergies:     Allergies: Duloxetine hcl, Lexapro [escitalopram], Amoxicillin, and Duloxetine          Past Medical History:     Past Medical History:   Diagnosis Date     Anxiety state 1/6/2012    Overview:  IMO Update 10/11     Attention deficit disorder of childhood without me 03/05/2007     Bipolar disorder, unspecified (H) 5/5/2016     Depressive disorder      Facial hemangioma 07/23/2012     Major depressive disorder, recurrent episode, moderate (H) 12/26/2016     Menorrhagia with irregular cycle 1/19/2021     Mild intellectual disabilities 1/5/2012    IMO Update 10/11     Mild intermittent asthma with status asthmaticus 8/18/2022     Nasal turbinate hypertrophy 07/23/2012     Tonsillar hypertrophy 07/23/2012            Past Surgical History:     Past Surgical History:   Procedure Laterality Date     HEAD & NECK SURGERY      laser surgery to birthmark on left side of face     LAPAROSCOPIC CYSTECTOMY OVARIAN (ONCOLOGY) Left 08/22/2019    Procedure: LAPAROSCOPY TREAT LEFT OVARIAN CYST, MULTIPLE ASPIRATIONS LEFT OVARIAN CYST;  Surgeon: Frow, David Franke, MD;  Location: [facility]       ENT family history reviewed         Social History:     Social History     Tobacco Use     Smoking status: Never     Smokeless tobacco: Never   Vaping Use     Vaping Use: Never used   Substance Use Topics     Alcohol use: No     Drug use: No            Review of Systems:     ROS: See HPI         Physical Exam:     /62 (BP Location: Left arm, Patient Position: Sitting, Cuff Size: Adult Regular)   Pulse 73   Temp 97.3  F (36.3  C) (Tympanic)   Wt 67.1 kg (148 lb)   SpO2 99%   BMI 24.63  kg/m      General - The patient is well nourished and well developed, and appears to have good nutritional status.  Alert and oriented to person and place, answers questions and cooperates with examination appropriately.   Head and Face - Normocephalic and atraumatic, with no gross asymmetry noted.  The facial nerve is intact, with strong symmetric movements.  Voice and Breathing - The patient was breathing comfortably without the use of accessory muscles. There was no wheezing, stridor. The patients voice was clear and strong, and had appropriate pitch and quality.  Ears - External ear normal.  The ears were examined under binocular microscopy and with otoscope.  Bilateral canals are patent, bilateral tympanic membranes are intact without effusion or retraction.  Bony landmarks appear normal.    Eyes - Extraocular movements intact, sclera were not icteric or injected, conjunctiva were pink and moist.  Mouth - Examination of the oral cavity showed pink, healthy oral mucosa. Dentition in good condition. No lesions or ulcerations noted. The tongue was mobile and midline.   TMJ-significant TMJ tenderness bilaterally with opening and closing the jaw and with palpation.  Masseter muscles are exquisitely tender to palpation.  No crepitus noted, no kimo click or pop noted with opening and closing the jaw.  Throat - The walls of the oropharynx were smooth, pink, moist, symmetric, and had no lesions or ulcerations.  The tonsillar pillars and soft palate were symmetric. The uvula was midline on elevation.    Neck - Normal midline excursion of the laryngotracheal complex during swallowing.  Full range of motion on passive movement.  Palpation of the occipital, submental, submandibular, internal jugular chain, and supraclavicular nodes did not demonstrate any abnormal lymph nodes or masses.  Palpation of the thyroid was soft and smooth, with no nodules or goiter appreciated.  The trachea was mobile and midline.  Bilateral trap  muscles are very tight and tender to palpation.  Nose - External contour is symmetric, no gross deflection or scars.  Nasal mucosa is pink and moist with no abnormal mucus.  The septum and turbinates were evaluated with nasal speculum, n pointing his right lower o polyps, masses, or purulence noted on examination.         Assessment and Plan:       ICD-10-CM    1. Referred otalgia of both ears  H92.03 nortriptyline (PAMELOR) 25 MG capsule      2. Seasonal allergic rhinitis, unspecified trigger  J30.2 Total IgE (Serolab)     Inhalent Panel [place] (Serolab)     Total IgE (Serolab)     Inhalent Panel [place] (Serolab)      3. Strain of neck muscle, initial encounter  S16.1XXA nortriptyline (PAMELOR) 25 MG capsule      4. Anxiety  F41.9 Primary Care - Care Coordination Referral      5. Forgetfulness  R68.89 Primary Care - Care Coordination Referral    mild intellectual disability, difficulty remembering to make it to appointments      6. Transportation insecurity  Z59.82     Patient does not have a vehicle, has to walk to appointments, feels she has insurance coverage for rides to appointments, needs assistance with rides.      7. TMJ tenderness, bilateral  M26.623         Follow up in 1 month with Orin Dumont    Start Nortriptyline take as prescribed     TMJ exercises given to you today, start these, if no improvement, recommend PT referral for bilateral TMJ    Allergy inhalent panel ordered.  You can go to lab at your convenience to have this drawn.  A nurse will call you with results     Orin FIORE  [facility]        Again, thank you for allowing me to participate in the care of your patient.        Sincerely,        Orin Dumont NP

## 2024-01-12 NOTE — PROGRESS NOTES
"Otolaryngology Note         Chief Complaint:     Patient presents with:  Otalgia           History of Present Illness:     Meg Diallo is a 24 year old female seen today for concerns for recurrent ear aches and itching.      She reports symptoms off and on for many years.  The pain is in both ears, some days it hurts worse in right vs left or vice versa.  Pain is back behind the ear also.  Loud noises hurt the ear.  She notes pounding in the ears at times.  Lots of popping in her ears.  She feels that she is very sensitive to loud sounds.       She has a history of migraines.  History of sound sensitivity with migraines.   She continues with migraines at this time.  Pain is in the back of the head and \"everywhere\".  Migraines are as often as weekly or can be up to a month apart.      She also reports headaches and pain from the back of her ears to her neck  She does clench and grind at time.  She has a history of TMJ.  She tried PT for TMJ in the past but states there were too many other things going on at the time and she wasn't able to focus on treatment.      No concerns for decreased hearing  She has a history of recurrent ear pain as a child, she reports every time she was brought in, her ears were fine.    Never smoker   Patient has no history of otological surgeries or procedures  No history of noise exposure    She has have recurrent feeling of off kilter and rotary vertigo at times.  This is newer.  She has been having similar symptoms since she was 17 years old.       Her ears itch all the time and has been present for many years.        She has associated sneezing and eye itching.  She feels she may have some seasonal allergies.  She has taken benadryl and nasal sprays with improvement with allergy symptoms.  Symptoms seem to be worse no real winter, spring and fall.  She has used an airpurifier without much improvement    She is able to breath through her nose, smell and taste.     Audiogram completed " 12/1/2023:  Tympanograms are Type A for both ears suggesting normal eardrum mobility.  Thresholds are normal range both ears.  Speech reception thresholds are in good agreement with pure tone average.  Word discrimination scores are excellent at supra-thresholds level.         Medications:     Current Outpatient Rx   Medication Sig Dispense Refill    acetaminophen (TYLENOL) 500 MG tablet Take 1,000 mg by mouth      albuterol (PROAIR HFA/PROVENTIL HFA/VENTOLIN HFA) 108 (90 Base) MCG/ACT inhaler Inhale 2 puffs into the lungs every 6 hours as needed for shortness of breath or wheezing 18 g 3    docusate sodium (COLACE) 100 MG capsule Take 1 capsule (100 mg) by mouth 2 times daily as needed for constipation 120 capsule 1    LORazepam (ATIVAN) 0.5 MG tablet Take 1 tablet by mouth 2 times daily      nortriptyline (PAMELOR) 25 MG capsule Take 1 capsule (25 mg) by mouth at bedtime for 30 days 30 capsule 1    nystatin-triamcinolone (MYCOLOG II) 060932-7.1 UNIT/GM-% external cream Apply topically 2 times daily as needed (skin irritation) 120 g 1    albuterol (ACCUNEB) 1.25 MG/3ML neb solution Take 1.25 mg by nebulization every 6 hours as needed for shortness of breath, wheezing or cough (Patient not taking: Reported on 1/12/2024)      busPIRone (BUSPAR) 5 MG tablet Take 1 tablet by mouth 2 times daily (Patient not taking: Reported on 1/12/2024)      ibuprofen (ADVIL/MOTRIN) 800 MG tablet Take 800 mg by mouth (Patient not taking: Reported on 1/12/2024)      norgestimate-ethinyl estradiol (ORTHO-CYCLEN) 0.25-35 MG-MCG tablet Take 1 tablet by mouth daily (Patient not taking: Reported on 1/12/2024) 84 tablet 3    Prenatal Vit-Fe Fumarate-FA (PRENATAL MULTIVITAMIN W/IRON) 27-0.8 MG tablet Take 1 tablet by mouth daily (Patient not taking: Reported on 1/12/2024) 90 tablet 3    sertraline (ZOLOFT) 25 MG tablet Take 1 tablet by mouth daily at 2 pm (Patient not taking: Reported on 1/12/2024)      VYVANSE 20 MG capsule Take 20 mg by  mouth every morning (Patient not taking: Reported on 1/12/2024)              Allergies:     Allergies: Duloxetine hcl, Lexapro [escitalopram], Amoxicillin, and Duloxetine          Past Medical History:     Past Medical History:   Diagnosis Date    Anxiety state 1/6/2012    Overview:  IMO Update 10/11    Attention deficit disorder of childhood without me 03/05/2007    Bipolar disorder, unspecified (H) 5/5/2016    Depressive disorder     Facial hemangioma 07/23/2012    Major depressive disorder, recurrent episode, moderate (H) 12/26/2016    Menorrhagia with irregular cycle 1/19/2021    Mild intellectual disabilities 1/5/2012    IMO Update 10/11    Mild intermittent asthma with status asthmaticus 8/18/2022    Nasal turbinate hypertrophy 07/23/2012    Tonsillar hypertrophy 07/23/2012            Past Surgical History:     Past Surgical History:   Procedure Laterality Date    HEAD & NECK SURGERY      laser surgery to birthmark on left side of face    LAPAROSCOPIC CYSTECTOMY OVARIAN (ONCOLOGY) Left 08/22/2019    Procedure: LAPAROSCOPY TREAT LEFT OVARIAN CYST, MULTIPLE ASPIRATIONS LEFT OVARIAN CYST;  Surgeon: Frow, David Franke, MD;  Location: [facility]       ENT family history reviewed         Social History:     Social History     Tobacco Use    Smoking status: Never    Smokeless tobacco: Never   Vaping Use    Vaping Use: Never used   Substance Use Topics    Alcohol use: No    Drug use: No            Review of Systems:     ROS: See HPI         Physical Exam:     /62 (BP Location: Left arm, Patient Position: Sitting, Cuff Size: Adult Regular)   Pulse 73   Temp 97.3  F (36.3  C) (Tympanic)   Wt 67.1 kg (148 lb)   SpO2 99%   BMI 24.63 kg/m      General - The patient is well nourished and well developed, and appears to have good nutritional status.  Alert and oriented to person and place, answers questions and cooperates with examination appropriately.   Head and Face - Normocephalic and atraumatic, with no gross  asymmetry noted.  The facial nerve is intact, with strong symmetric movements.  Voice and Breathing - The patient was breathing comfortably without the use of accessory muscles. There was no wheezing, stridor. The patients voice was clear and strong, and had appropriate pitch and quality.  Ears - External ear normal.  The ears were examined under binocular microscopy and with otoscope.  Bilateral canals are patent, bilateral tympanic membranes are intact without effusion or retraction.  Bony landmarks appear normal.    Eyes - Extraocular movements intact, sclera were not icteric or injected, conjunctiva were pink and moist.  Mouth - Examination of the oral cavity showed pink, healthy oral mucosa. Dentition in good condition. No lesions or ulcerations noted. The tongue was mobile and midline.   TMJ-significant TMJ tenderness bilaterally with opening and closing the jaw and with palpation.  Masseter muscles are exquisitely tender to palpation.  No crepitus noted, no kimo click or pop noted with opening and closing the jaw.  Throat - The walls of the oropharynx were smooth, pink, moist, symmetric, and had no lesions or ulcerations.  The tonsillar pillars and soft palate were symmetric. The uvula was midline on elevation.    Neck - Normal midline excursion of the laryngotracheal complex during swallowing.  Full range of motion on passive movement.  Palpation of the occipital, submental, submandibular, internal jugular chain, and supraclavicular nodes did not demonstrate any abnormal lymph nodes or masses.  Palpation of the thyroid was soft and smooth, with no nodules or goiter appreciated.  The trachea was mobile and midline.  Bilateral trap muscles are very tight and tender to palpation.  Nose - External contour is symmetric, no gross deflection or scars.  Nasal mucosa is pink and moist with no abnormal mucus.  The septum and turbinates were evaluated with nasal speculum, n pointing his right lower o polyps, masses,  or purulence noted on examination.         Assessment and Plan:       ICD-10-CM    1. Referred otalgia of both ears  H92.03 nortriptyline (PAMELOR) 25 MG capsule      2. Seasonal allergic rhinitis, unspecified trigger  J30.2 Total IgE (Serolab)     Inhalent Panel [place] (Serolab)     Total IgE (Serolab)     Inhalent Panel [place] (Serolab)      3. Strain of neck muscle, initial encounter  S16.1XXA nortriptyline (PAMELOR) 25 MG capsule      4. Anxiety  F41.9 Primary Care - Care Coordination Referral      5. Forgetfulness  R68.89 Primary Care - Care Coordination Referral    mild intellectual disability, difficulty remembering to make it to appointments      6. Transportation insecurity  Z59.82     Patient does not have a vehicle, has to walk to appointments, feels she has insurance coverage for rides to appointments, needs assistance with rides.      7. TMJ tenderness, bilateral  M26.623         Follow up in 1 month with Orin Dumont    Start Nortriptyline take as prescribed     TMJ exercises given to you today, start these, if no improvement, recommend PT referral for bilateral TMJ    Allergy inhalent panel ordered.  You can go to lab at your convenience to have this drawn.  A nurse will call you with results     Orin Dumont NP-C  [facility]

## 2024-01-12 NOTE — PATIENT INSTRUCTIONS
Thank you for allowing Orin Dumont and our ENT team to participate in your care.  If your medications are too expensive, please give the nurse a call.  We can possibly change this medication.  If you have a scheduling or an appointment question please contact our Health Unit Coordinator at their direct line [phone] ext [phone].   ALL nursing questions or concerns can be directed to your ENT nurse at: [phone] - Zrm     Follow up in 1 month with Orin Dumont    Start Nortriptyline take as prescribed     TMJ exercises given to you today    Allergy inhalent panel ordered.  You can go to lab at your convenience to have this drawn.  A nurse will call you with results

## 2024-01-23 ENCOUNTER — PATIENT OUTREACH (OUTPATIENT)
Dept: CARE COORDINATION | Facility: OTHER | Age: 24
End: 2024-01-23

## 2024-01-23 LAB
SCANNED LAB RESULT: NORMAL
SCANNED LAB RESULT: NORMAL

## 2024-01-23 NOTE — PROGRESS NOTES
Clinic Care Coordination Contact  Care Team Conversations      RN CC checked in to PCP Bulmaro Hidalgo NP to inquire about what she feels patient needs are for care coordination.  PCP explained patient lives at [address] and walks to appointments and may just need maintenance/reminders for appointments.    RN CC telephoned patient this morning to discuss transportation needs.  Patient states she walks to appointments and also has bus tickets and is aware of transportation available from her insurance.  RN CC asked patient if she needed assistance in arranging transportation and patient declines.    RN CC asked patient if she was having any additional needs and she stated just remembering appointments.  RN CC asked if patient is getting telephone reminders for her appointments and she stated that she is.  RN CC also advised patient that appointments are also available on Patient Home Monitoring.  Another resource provided by RN CC was to request a calendar with appointments at the scheduling desk when making appointments.  Patient states that she didn't know she could do that.    Will check back in with patient at the beginning of next month to see if patient is needing assistance with setting up her transportation or needs further assistance with appointments.

## 2024-01-31 ENCOUNTER — OFFICE VISIT (OUTPATIENT)
Dept: FAMILY MEDICINE | Facility: OTHER | Age: 24
End: 2024-01-31
Attending: NURSE PRACTITIONER
Payer: COMMERCIAL

## 2024-01-31 VITALS
WEIGHT: 148.8 LBS | RESPIRATION RATE: 16 BRPM | DIASTOLIC BLOOD PRESSURE: 64 MMHG | BODY MASS INDEX: 24.79 KG/M2 | OXYGEN SATURATION: 100 % | HEART RATE: 78 BPM | HEIGHT: 65 IN | SYSTOLIC BLOOD PRESSURE: 108 MMHG | TEMPERATURE: 97.8 F

## 2024-01-31 DIAGNOSIS — H65.92 OME (OTITIS MEDIA WITH EFFUSION), LEFT: Primary | ICD-10-CM

## 2024-01-31 DIAGNOSIS — H92.02 OTALGIA, LEFT: ICD-10-CM

## 2024-01-31 PROCEDURE — 99213 OFFICE O/P EST LOW 20 MIN: CPT | Performed by: NURSE PRACTITIONER

## 2024-01-31 PROCEDURE — G0463 HOSPITAL OUTPT CLINIC VISIT: HCPCS

## 2024-01-31 RX ORDER — AZITHROMYCIN 250 MG/1
TABLET, FILM COATED ORAL
Qty: 11 TABLET | Refills: 0 | Status: SHIPPED | OUTPATIENT
Start: 2024-01-31 | End: 2024-02-10

## 2024-01-31 RX ORDER — IBUPROFEN 600 MG/1
600 TABLET, FILM COATED ORAL EVERY 6 HOURS PRN
Qty: 60 TABLET | Refills: 0 | Status: SHIPPED | OUTPATIENT
Start: 2024-01-31 | End: 2024-05-24

## 2024-01-31 ASSESSMENT — ASTHMA QUESTIONNAIRES
QUESTION_2 LAST FOUR WEEKS HOW OFTEN HAVE YOU HAD SHORTNESS OF BREATH: ONCE A DAY
QUESTION_4 LAST FOUR WEEKS HOW OFTEN HAVE YOU USED YOUR RESCUE INHALER OR NEBULIZER MEDICATION (SUCH AS ALBUTEROL): TWO OR THREE TIMES PER WEEK
QUESTION_5 LAST FOUR WEEKS HOW WOULD YOU RATE YOUR ASTHMA CONTROL: SOMEWHAT CONTROLLED
QUESTION_1 LAST FOUR WEEKS HOW MUCH OF THE TIME DID YOUR ASTHMA KEEP YOU FROM GETTING AS MUCH DONE AT WORK, SCHOOL OR AT HOME: A LITTLE OF THE TIME
ACT_TOTALSCORE: 16
QUESTION_3 LAST FOUR WEEKS HOW OFTEN DID YOUR ASTHMA SYMPTOMS (WHEEZING, COUGHING, SHORTNESS OF BREATH, CHEST TIGHTNESS OR PAIN) WAKE YOU UP AT NIGHT OR EARLIER THAN USUAL IN THE MORNING: ONCE OR TWICE
ACT_TOTALSCORE: 16

## 2024-01-31 ASSESSMENT — PAIN SCALES - GENERAL: PAINLEVEL: SEVERE PAIN (7)

## 2024-01-31 NOTE — COMMUNITY RESOURCES LIST (ENGLISH)
01/31/2024   [facility] [employer]  N/A  For questions about this resource list or additional care needs, please contact your primary care clinic or care manager.  Phone: [phone]   Email: N/A   Address: [address] [place] [address]   Hours: N/A        Transportation       Free or low-cost transportation  1  [facility] "[employer] Simple, Fast and Elegant Tablet Sales App" [employer] - [facility] - [employer] - Free or Low-cost Transportation Distance: 17.85 miles      In-Person   [address] [place] [address]  Language: English  Hours: Mon - Fri 8:00 AM - 4:30 PM  Fees: Free   Phone: (146) 459-8423 Email: contactus@[employer].org Website: https://www.[employer].org/component/mymaplocations/mrfdjca-wbsncomktor-lchoxv          Important Numbers & Websites       Emergency Services   911  [facility] Services   311  Poison Control   (627) 964-2345  Suicide Prevention Lifeline   (199) 756-2827 (TALK)  Child Abuse Hotline   (991) 178-6081 (4-A-Child)  Sexual Assault Hotline   (913) 837-3639 (HOPE)  National Runaway Safeline   (367) 829-7880 (RUNAWAY)  All-Options Talkline   (147) 855-1150  Substance Abuse Referral   (975) 948-1375 (HELP)

## 2024-01-31 NOTE — PROGRESS NOTES
Assessment & Plan       OME (otitis media with effusion), left  - azithromycin (ZITHROMAX) 250 MG tablet; Take 2 tablets (500 mg) by mouth daily for 1 day, THEN 1 tablet (250 mg) daily for 9 days.      Otalgia, left  - ibuprofen (ADVIL/MOTRIN) 600 MG tablet; Take 1 tablet (600 mg) by mouth every 6 hours as needed for moderate pain      Insure adequate fluid intake  Get plenty of rest  Monitor for temp at home, treat with OTC Tylenol or Ibuprofen per package instruction.  Humidity at home (add bacteriostatic solution to humidifier)  Please return in you do not improve  To UC or ER with persistent, worsening, or concerning symptoms      Nel Vazquez [facility]  [phone]             Meg is a 24 year old, presenting for the following health issues:  Ear Problem      Acute Illness  Acute illness concerns: left ear pain  Onset/Duration: 2 days  Symptoms:  Fever: No  Chills/Sweats: No  Headache (location?): YES  Sinus Pressure: No  Conjunctivitis:  No  Ear Pain: YES: left  Rhinorrhea: No  Congestion: No  Sore Throat: No  Cough: no  Wheeze: No  Decreased Appetite: No  Nausea: No  Vomiting: No  Diarrhea: No  Dysuria/Freq.: No  Dysuria or Hematuria: No  Fatigue/Achiness: No  Sick/Strep Exposure: No  Therapies tried and outcome: tylenol          Patient Active Problem List   Diagnosis    Major depressive disorder, recurrent episode, moderate (H)    Anxiety state    Bipolar disorder, unspecified (H)    Mild intellectual disabilities    Mild intermittent asthma without complication     Past Surgical History:   Procedure Laterality Date    HEAD & NECK SURGERY      laser surgery to birthmark on left side of face    LAPAROSCOPIC CYSTECTOMY OVARIAN (ONCOLOGY) Left 08/22/2019    Procedure: LAPAROSCOPY TREAT LEFT OVARIAN CYST, MULTIPLE ASPIRATIONS LEFT OVARIAN CYST;  Surgeon: Frow, David Franke, MD;  Location: [facility]       Social History     Tobacco Use    Smoking status: Never    Smokeless tobacco: Never   Substance Use Topics     Alcohol use: No     Family History   Problem Relation Age of Onset    Bipolar Disorder Mother     Depression Mother     Heart Disease Father     Diabetes Other     Myocardial Infarction Paternal Grandfather     Lung Cancer Paternal Grandfather     Other - See Comments Paternal Aunt         mulitiple sclerosis    Breast Cancer Maternal Grandfather         x 2             Current Outpatient Medications   Medication Sig Dispense Refill    acetaminophen (TYLENOL) 500 MG tablet Take 1,000 mg by mouth      albuterol (PROAIR HFA/PROVENTIL HFA/VENTOLIN HFA) 108 (90 Base) MCG/ACT inhaler Inhale 2 puffs into the lungs every 6 hours as needed for shortness of breath or wheezing 18 g 3    docusate sodium (COLACE) 100 MG capsule Take 1 capsule (100 mg) by mouth 2 times daily as needed for constipation 120 capsule 1    ibuprofen (ADVIL/MOTRIN) 800 MG tablet Take 800 mg by mouth      LORazepam (ATIVAN) 0.5 MG tablet Take 1 tablet by mouth 2 times daily      nortriptyline (PAMELOR) 25 MG capsule Take 1 capsule (25 mg) by mouth at bedtime for 30 days 30 capsule 1    nystatin-triamcinolone (MYCOLOG II) 197158-7.1 UNIT/GM-% external cream Apply topically 2 times daily as needed (skin irritation) 120 g 1    albuterol (ACCUNEB) 1.25 MG/3ML neb solution Take 1.25 mg by nebulization every 6 hours as needed for shortness of breath, wheezing or cough      busPIRone (BUSPAR) 5 MG tablet Take 1 tablet by mouth 2 times daily (Patient not taking: Reported on 1/12/2024)      norgestimate-ethinyl estradiol (ORTHO-CYCLEN) 0.25-35 MG-MCG tablet Take 1 tablet by mouth daily 84 tablet 3    Prenatal Vit-Fe Fumarate-FA (PRENATAL MULTIVITAMIN W/IRON) 27-0.8 MG tablet Take 1 tablet by mouth daily 90 tablet 3    sertraline (ZOLOFT) 25 MG tablet Take 1 tablet by mouth daily at 2 pm (Patient not taking: Reported on 1/31/2024)      VYVANSE 20 MG capsule Take 20 mg by mouth every morning           Allergies   Allergen Reactions    Duloxetine Hcl     Lexapro  "[Escitalopram] Other (See Comments)     hypersensitivity to touch     Amoxicillin Other (See Comments)     Yeast infection    Duloxetine Other (See Comments)         Recent Labs   Lab Test 11/29/23  1355 08/13/23  0325 01/19/23  1353 05/11/21  0000 03/24/21  0000 01/29/21  0000 01/19/21  1428 08/21/20  0000 06/03/20  1047 05/08/18  1027 06/22/17  2357   A1C  --   --   --   --   --   --  5.1  --   --   --   --    ALT  --  25  --  15  --  15  --    < > 24   < >  --    CR 0.66 0.48*  --  0.81   < > 0.77  --    < > 0.68   < > 0.41*   GFRESTIMATED >90 >90  --  >60   < > >60  --    < > >90   < > >90  Non  GFR Calc     GFRESTBLACK  --   --   --   --   --   --   --   --  >90  --  >90  African American GFR Calc     POTASSIUM 4.0 3.5  --  3.6   < > 3.0*  --    < > 3.6   < > 3.8   TSH  --   --  0.88  --   --  4.17* 1.46  --   --    < >  --     < > = values in this interval not displayed.          BP Readings from Last 3 Encounters:   01/31/24 108/64   01/12/24 116/62   11/29/23 118/70    Wt Readings from Last 3 Encounters:   01/31/24 67.5 kg (148 lb 12.8 oz)   01/12/24 67.1 kg (148 lb)   11/29/23 72.7 kg (160 lb 3.2 oz)                  Review of Systems  Constitutional, HEENT, cardiovascular, pulmonary, gi and gu systems are negative, except as otherwise noted.          Objective    /64 (BP Location: Left arm, Patient Position: Sitting, Cuff Size: Adult Regular)   Pulse 78   Temp 97.8  F (36.6  C) (Tympanic)   Resp 16   Ht 1.651 m (5' 5\")   Wt 67.5 kg (148 lb 12.8 oz)   LMP 01/31/2024   SpO2 100%   BMI 24.76 kg/m    Body mass index is 24.76 kg/m .      Physical Exam   GENERAL: alert and no distress  HENT: left ear: erythematous and bulging membrane  NECK: no adenopathy, no asymmetry, masses, or scars  RESP: lungs clear to auscultation - no rales, rhonchi or wheezes  CV: regular rate and rhythm, normal S1 S2, no S3 or S4, no murmur, click or rub, no peripheral edema  MS: no gross musculoskeletal " defects noted, no edema  SKIN: no suspicious lesions or rashes            Signed Electronically by: Nel Vazquez CNP

## 2024-01-31 NOTE — PATIENT INSTRUCTIONS
Assessment & Plan       OME (otitis media with effusion), left  - azithromycin (ZITHROMAX) 250 MG tablet; Take 2 tablets (500 mg) by mouth daily for 1 day, THEN 1 tablet (250 mg) daily for 9 days.      Otalgia, left  - ibuprofen (ADVIL/MOTRIN) 600 MG tablet; Take 1 tablet (600 mg) by mouth every 6 hours as needed for moderate pain      Insure adequate fluid intake  Get plenty of rest  Monitor for temp at home, treat with OTC Tylenol or Ibuprofen per package instruction.  Humidity at home (add bacteriostatic solution to humidifier)  Please return in you do not improve  To UC or ER with persistent, worsening, or concerning symptoms      [facility]  [phone]

## 2024-02-02 ENCOUNTER — PATIENT OUTREACH (OUTPATIENT)
Dept: CARE COORDINATION | Facility: OTHER | Age: 24
End: 2024-02-02

## 2024-02-02 NOTE — PROGRESS NOTES
Clinic Care Coordination Contact  Care Team Conversations    Telephone call made to patient to review appointments coming up for the next month.  Patient declines assistance with transportation.  Patient lives walking distance to clinic.  Advised patient to call if anything changes as weather can be unpredicatable in the winter.  Patient agreed.  Will plan to follow-up in one month for review of appointments and check on transportation needs.

## 2024-02-16 ENCOUNTER — OFFICE VISIT (OUTPATIENT)
Dept: OTOLARYNGOLOGY | Facility: OTHER | Age: 24
End: 2024-02-16
Attending: NURSE PRACTITIONER
Payer: COMMERCIAL

## 2024-02-16 VITALS
DIASTOLIC BLOOD PRESSURE: 80 MMHG | BODY MASS INDEX: 24.79 KG/M2 | SYSTOLIC BLOOD PRESSURE: 121 MMHG | WEIGHT: 148.81 LBS | HEIGHT: 65 IN | RESPIRATION RATE: 18 BRPM | TEMPERATURE: 97.8 F | OXYGEN SATURATION: 100 % | HEART RATE: 89 BPM

## 2024-02-16 DIAGNOSIS — F33.1 MAJOR DEPRESSIVE DISORDER, RECURRENT EPISODE, MODERATE (H): Primary | ICD-10-CM

## 2024-02-16 DIAGNOSIS — S16.1XXA STRAIN OF NECK MUSCLE, INITIAL ENCOUNTER: ICD-10-CM

## 2024-02-16 DIAGNOSIS — F41.1 ANXIETY STATE: ICD-10-CM

## 2024-02-16 DIAGNOSIS — M26.623 TMJ TENDERNESS, BILATERAL: ICD-10-CM

## 2024-02-16 DIAGNOSIS — H92.03 REFERRED OTALGIA OF BOTH EARS: ICD-10-CM

## 2024-02-16 PROCEDURE — 99213 OFFICE O/P EST LOW 20 MIN: CPT | Performed by: NURSE PRACTITIONER

## 2024-02-16 PROCEDURE — G0463 HOSPITAL OUTPT CLINIC VISIT: HCPCS

## 2024-02-16 RX ORDER — NORTRIPTYLINE HCL 25 MG
25 CAPSULE ORAL AT BEDTIME
Qty: 30 CAPSULE | Refills: 1 | Status: SHIPPED | OUTPATIENT
Start: 2024-02-16 | End: 2024-05-24

## 2024-02-16 ASSESSMENT — PAIN SCALES - GENERAL: PAINLEVEL: SEVERE PAIN (6)

## 2024-02-16 ASSESSMENT — PATIENT HEALTH QUESTIONNAIRE - PHQ9: SUM OF ALL RESPONSES TO PHQ QUESTIONS 1-9: 20

## 2024-02-16 NOTE — PROGRESS NOTES
Otolaryngology Note         Chief Complaint:     Patient presents with:  Follow Up: 1 month follow up TMJ            History of Present Illness:     Meg Diallo is a 24 year old female seen today for follow up ear and jaw pain.      She was last seen in ENT on 1/12/2024 by this provider for the same.  Ear exam showed normal healthy ears bilaterally, TMJ had significant tenderness and pain.    After long discussion we decided to start her on nortriptyline for a combination of migraine prevention, treatment for referred otalgia, anxiety/depression.  She states that she started the nortriptyline and has been tolerating fairly well.  She feels that her headaches have improved, however no real improvement of otalgia.    Sleep is all over the place.  She works night shift  She does feel that she sleeps better with the nortriptyline  She has been feeling a slight worsening of depression.  She has a significant history of anxiety and depression with history of self injures behavior.  She states that she has had thoughts of SIB, however has plans in place, support system.  She has not cut herself.  She is working on referral to therapy for CBT.  She states that she has had thoughts that it would be easier if she just was not here, however she is adamant that she would not follow through with any suicidal behaviors.  She has 2 children at home and verbalizes the importance of being there for her children.  She does verbalize that she would contact the crisis support line or family support if she did have concerning thoughts of suicide.    She reaches out to her sister that lives in the same apartment building as her and is supportive  She lives with her children's father and supports him financially, however he does watch the children while she works.  She feels safe in her home, reports that he is not abusive to her or her children.    She has been completing some of the physical therapy exercises at home, but admits she  could benefit from some outside help.  She would like to start physical therapy.         Medications:     Current Outpatient Rx   Medication Sig Dispense Refill    acetaminophen (TYLENOL) 500 MG tablet Take 1,000 mg by mouth      albuterol (PROAIR HFA/PROVENTIL HFA/VENTOLIN HFA) 108 (90 Base) MCG/ACT inhaler Inhale 2 puffs into the lungs every 6 hours as needed for shortness of breath or wheezing 18 g 3    diclofenac (VOLTAREN) 1 % topical gel Apply a thin layer to the jaw and neck 4 times daily as needed for pain 50 g 11    docusate sodium (COLACE) 100 MG capsule Take 1 capsule (100 mg) by mouth 2 times daily as needed for constipation 120 capsule 1    ibuprofen (ADVIL/MOTRIN) 600 MG tablet Take 1 tablet (600 mg) by mouth every 6 hours as needed for moderate pain 60 tablet 0    ibuprofen (ADVIL/MOTRIN) 800 MG tablet Take 800 mg by mouth      LORazepam (ATIVAN) 0.5 MG tablet Take 1 tablet by mouth 2 times daily      nortriptyline (PAMELOR) 25 MG capsule Take 1 capsule (25 mg) by mouth at bedtime 30 capsule 1    nystatin-triamcinolone (MYCOLOG II) 769133-0.1 UNIT/GM-% external cream Apply topically 2 times daily as needed (skin irritation) 120 g 1    sertraline (ZOLOFT) 25 MG tablet Take 1 tablet by mouth daily at 2 pm      busPIRone (BUSPAR) 5 MG tablet Take 1 tablet by mouth 2 times daily (Patient not taking: Reported on 1/12/2024)              Allergies:     Allergies: Duloxetine hcl, Lexapro [escitalopram], Amoxicillin, and Duloxetine          Past Medical History:     Past Medical History:   Diagnosis Date    Anxiety state 1/6/2012    Overview:  IMO Update 10/11    Attention deficit disorder of childhood without me 03/05/2007    Bipolar disorder, unspecified (H) 5/5/2016    Depressive disorder     Facial hemangioma 07/23/2012    Major depressive disorder, recurrent episode, moderate (H) 12/26/2016    Menorrhagia with irregular cycle 1/19/2021    Mild intellectual disabilities 1/5/2012    IMO Update 10/11    Mild  "intermittent asthma with status asthmaticus 8/18/2022    Nasal turbinate hypertrophy 07/23/2012    Tonsillar hypertrophy 07/23/2012            Past Surgical History:     Past Surgical History:   Procedure Laterality Date    HEAD & NECK SURGERY      laser surgery to birthmark on left side of face    LAPAROSCOPIC CYSTECTOMY OVARIAN (ONCOLOGY) Left 08/22/2019    Procedure: LAPAROSCOPY TREAT LEFT OVARIAN CYST, MULTIPLE ASPIRATIONS LEFT OVARIAN CYST;  Surgeon: Frow, David Franke, MD;  Location: [facility] OR       ENT family history reviewed         Social History:     Social History     Tobacco Use    Smoking status: Never    Smokeless tobacco: Never   Vaping Use    Vaping Use: Never used   Substance Use Topics    Alcohol use: No    Drug use: No            Review of Systems:     ROS: See HPI         Physical Exam:     /80 (BP Location: Left arm, Patient Position: Sitting, Cuff Size: Adult Regular)   Pulse 89   Temp 97.8  F (36.6  C) (Temporal)   Resp 18   Ht 1.651 m (5' 5\")   Wt 67.5 kg (148 lb 13 oz)   LMP 01/31/2024   SpO2 100%   BMI 24.76 kg/m      General - The patient is well nourished and well developed, and appears to have good nutritional status.  Alert and oriented to person and place, answers questions and cooperates with examination appropriately.   Head and Face - Normocephalic and atraumatic, with no gross asymmetry noted.  The facial nerve is intact, with strong symmetric movements.  Voice and Breathing - The patient was breathing comfortably without the use of accessory muscles. There was no wheezing, stridor. The patients voice was clear and strong, and had appropriate pitch and quality.  Ears - External ear normal. Canals are patent. Right tympanic membrane is intact without effusion, retraction or mass. Left tympanic membrane is intact without effusion, retraction or mass.  Eyes - Extraocular movements intact, sclera were not icteric or injected.  Mouth - Examination of the oral cavity showed " pink, healthy oral mucosa. Dentition in good condition. No lesions or ulcerations noted. The tongue was mobile and midline.   TMJ-continued significant pain/pop click with opening and closing the jaw.  There is tenderness to palpation in the postauricular/superior ramus and masseter muscles bilaterally.  The skin is normothermic and without erythema.  No fluctuance or induration.  There is mild trismus, she is able to open her jaw to 2+ finger breadths with minimal pain.  Throat - The walls of the oropharynx were smooth, pink, moist, symmetric, and had no lesions or ulcerations.  The tonsillar pillars and soft palate were symmetric. The uvula was midline on elevation.    Neck - Normal midline excursion of the laryngotracheal complex during swallowing.  Full range of motion on passive movement.  Palpation of the occipital, submental, submandibular, internal jugular chain, and supraclavicular nodes did not demonstrate any abnormal lymph nodes or masses.  Palpation of the thyroid was soft and smooth, with no nodules or goiter appreciated.  The trachea was mobile and midline.  Nose - External contour is symmetric, no gross deflection or scars.           Assessment and Plan:       ICD-10-CM    1. Major depressive disorder, recurrent episode, moderate (H)  F33.1       2. Referred otalgia of both ears  H92.03 nortriptyline (PAMELOR) 25 MG capsule     Physical Therapy Referral     diclofenac (VOLTAREN) 1 % topical gel      3. Strain of neck muscle, initial encounter  S16.1XXA nortriptyline (PAMELOR) 25 MG capsule     Physical Therapy Referral     diclofenac (VOLTAREN) 1 % topical gel      4. Anxiety state  F41.1       5. TMJ tenderness, bilateral  M26.623         At this time she would like to continue with the nortriptyline at 25 mg at bedtime.  We discussed advancing the dosing, she is not ready for this at this time.  I gave her the number to the [place] crisis line and the website to [facility].   Start Voltaren as  prescribed to the jaw and neck  Referral for physical therapy completed    Orin Dumont NP-C  [facility]NT

## 2024-02-16 NOTE — LETTER
2/16/2024         RE: Meg Diallo  [address]  [facility] [address]        Dear Colleague,    Thank you for referring your patient, Meg Diallo, to the [facility]. Please see a copy of my visit note below.    Otolaryngology Note         Chief Complaint:     Patient presents with:  Follow Up: 1 month follow up TMJ            History of Present Illness:     Meg Diallo is a 24 year old female seen today for follow up ear and jaw pain.      She was last seen in ENT on 1/12/2024 by this provider for the same.  Ear exam showed normal healthy ears bilaterally, TMJ had significant tenderness and pain.    After long discussion we decided to start her on nortriptyline for a combination of migraine prevention, treatment for referred otalgia, anxiety/depression.  She states that she started the nortriptyline and has been tolerating fairly well.  She feels that her headaches have improved, however no real improvement of otalgia.    Sleep is all over the place.  She works night shift  She does feel that she sleeps better with the nortriptyline  She has been feeling a slight worsening of depression.  She has a significant history of anxiety and depression with history of self injures behavior.  She states that she has had thoughts of SIB, however has plans in place, support system.  She has not cut herself.  She is working on referral to therapy for CBT.  She states that she has had thoughts that it would be easier if she just was not here, however she is adamant that she would not follow through with any suicidal behaviors.  She has 2 children at home and verbalizes the importance of being there for her children.  She does verbalize that she would contact the crisis support line or family support if she did have concerning thoughts of suicide.    She reaches out to her sister that lives in the same apartment building as her and is supportive  She lives with her children's father and  supports him financially, however he does watch the children while she works.  She feels safe in her home, reports that he is not abusive to her or her children.    She has been completing some of the physical therapy exercises at home, but admits she could benefit from some outside help.  She would like to start physical therapy.         Medications:     Current Outpatient Rx   Medication Sig Dispense Refill     acetaminophen (TYLENOL) 500 MG tablet Take 1,000 mg by mouth       albuterol (PROAIR HFA/PROVENTIL HFA/VENTOLIN HFA) 108 (90 Base) MCG/ACT inhaler Inhale 2 puffs into the lungs every 6 hours as needed for shortness of breath or wheezing 18 g 3     diclofenac (VOLTAREN) 1 % topical gel Apply a thin layer to the jaw and neck 4 times daily as needed for pain 50 g 11     docusate sodium (COLACE) 100 MG capsule Take 1 capsule (100 mg) by mouth 2 times daily as needed for constipation 120 capsule 1     ibuprofen (ADVIL/MOTRIN) 600 MG tablet Take 1 tablet (600 mg) by mouth every 6 hours as needed for moderate pain 60 tablet 0     ibuprofen (ADVIL/MOTRIN) 800 MG tablet Take 800 mg by mouth       LORazepam (ATIVAN) 0.5 MG tablet Take 1 tablet by mouth 2 times daily       nortriptyline (PAMELOR) 25 MG capsule Take 1 capsule (25 mg) by mouth at bedtime 30 capsule 1     nystatin-triamcinolone (MYCOLOG II) 179909-5.1 UNIT/GM-% external cream Apply topically 2 times daily as needed (skin irritation) 120 g 1     sertraline (ZOLOFT) 25 MG tablet Take 1 tablet by mouth daily at 2 pm       busPIRone (BUSPAR) 5 MG tablet Take 1 tablet by mouth 2 times daily (Patient not taking: Reported on 1/12/2024)              Allergies:     Allergies: Duloxetine hcl, Lexapro [escitalopram], Amoxicillin, and Duloxetine          Past Medical History:     Past Medical History:   Diagnosis Date     Anxiety state 1/6/2012    Overview:  IMO Update 10/11     Attention deficit disorder of childhood without me 03/05/2007     Bipolar disorder,  "unspecified (H) 5/5/2016     Depressive disorder      Facial hemangioma 07/23/2012     Major depressive disorder, recurrent episode, moderate (H) 12/26/2016     Menorrhagia with irregular cycle 1/19/2021     Mild intellectual disabilities 1/5/2012    IMO Update 10/11     Mild intermittent asthma with status asthmaticus 8/18/2022     Nasal turbinate hypertrophy 07/23/2012     Tonsillar hypertrophy 07/23/2012            Past Surgical History:     Past Surgical History:   Procedure Laterality Date     HEAD & NECK SURGERY      laser surgery to birthmark on left side of face     LAPAROSCOPIC CYSTECTOMY OVARIAN (ONCOLOGY) Left 08/22/2019    Procedure: LAPAROSCOPY TREAT LEFT OVARIAN CYST, MULTIPLE ASPIRATIONS LEFT OVARIAN CYST;  Surgeon: Frow, David Franke, MD;  Location: [facility] OR       ENT family history reviewed         Social History:     Social History     Tobacco Use     Smoking status: Never     Smokeless tobacco: Never   Vaping Use     Vaping Use: Never used   Substance Use Topics     Alcohol use: No     Drug use: No            Review of Systems:     ROS: See HPI         Physical Exam:     /80 (BP Location: Left arm, Patient Position: Sitting, Cuff Size: Adult Regular)   Pulse 89   Temp 97.8  F (36.6  C) (Temporal)   Resp 18   Ht 1.651 m (5' 5\")   Wt 67.5 kg (148 lb 13 oz)   LMP 01/31/2024   SpO2 100%   BMI 24.76 kg/m      General - The patient is well nourished and well developed, and appears to have good nutritional status.  Alert and oriented to person and place, answers questions and cooperates with examination appropriately.   Head and Face - Normocephalic and atraumatic, with no gross asymmetry noted.  The facial nerve is intact, with strong symmetric movements.  Voice and Breathing - The patient was breathing comfortably without the use of accessory muscles. There was no wheezing, stridor. The patients voice was clear and strong, and had appropriate pitch and quality.  Ears - External ear normal. " Canals are patent. Right tympanic membrane is intact without effusion, retraction or mass. Left tympanic membrane is intact without effusion, retraction or mass.  Eyes - Extraocular movements intact, sclera were not icteric or injected.  Mouth - Examination of the oral cavity showed pink, healthy oral mucosa. Dentition in good condition. No lesions or ulcerations noted. The tongue was mobile and midline.   TMJ-continued significant pain/pop click with opening and closing the jaw.  There is tenderness to palpation in the postauricular/superior ramus and masseter muscles bilaterally.  The skin is normothermic and without erythema.  No fluctuance or induration.  There is mild trismus, she is able to open her jaw to 2+ finger breadths with minimal pain.  Throat - The walls of the oropharynx were smooth, pink, moist, symmetric, and had no lesions or ulcerations.  The tonsillar pillars and soft palate were symmetric. The uvula was midline on elevation.    Neck - Normal midline excursion of the laryngotracheal complex during swallowing.  Full range of motion on passive movement.  Palpation of the occipital, submental, submandibular, internal jugular chain, and supraclavicular nodes did not demonstrate any abnormal lymph nodes or masses.  Palpation of the thyroid was soft and smooth, with no nodules or goiter appreciated.  The trachea was mobile and midline.  Nose - External contour is symmetric, no gross deflection or scars.           Assessment and Plan:       ICD-10-CM    1. Major depressive disorder, recurrent episode, moderate (H)  F33.1       2. Referred otalgia of both ears  H92.03 nortriptyline (PAMELOR) 25 MG capsule     Physical Therapy Referral     diclofenac (VOLTAREN) 1 % topical gel      3. Strain of neck muscle, initial encounter  S16.1XXA nortriptyline (PAMELOR) 25 MG capsule     Physical Therapy Referral     diclofenac (VOLTAREN) 1 % topical gel      4. Anxiety state  F41.1       5. TMJ tenderness,  bilateral  M26.623         At this time she would like to continue with the nortriptyline at 25 mg at bedtime.  We discussed advancing the dosing, she is not ready for this at this time.  I gave her the number to the [place] crisis line and the website to [employer].   Start Voltaren as prescribed to the jaw and neck  Referral for physical therapy completed    Orin SANTOSC  [facility]      Again, thank you for allowing me to participate in the care of your patient.        Sincerely,        Orin Dumont, NP

## 2024-02-16 NOTE — PATIENT INSTRUCTIONS
Thank you for allowing Orin Julia and our ENT team to participate in your care.  If your medications are too expensive, please give the nurse a call.  We can possibly change this medication.  If you have a scheduling or an appointment question please contact our Health Unit Coordinator at their direct line [phone].   ALL nursing questions or concerns can be directed to your ENT nurse at: [phone] - [facility]     Crisis resources:   text [place] to 832379  [email or web address].org/    nortriptyline (PAMELOR) 25 MG capsule Take 1 capsule (25 mg) by mouth at bedtime     Start Voltaren take as prescribed     Physical Therapy Referral someone will call you to schedule.     Mental Health referral being sent by PCP

## 2024-02-21 ENCOUNTER — OFFICE VISIT (OUTPATIENT)
Dept: PODIATRY | Facility: OTHER | Age: 24
End: 2024-02-21
Attending: PODIATRIST
Payer: COMMERCIAL

## 2024-02-21 VITALS
OXYGEN SATURATION: 98 % | SYSTOLIC BLOOD PRESSURE: 110 MMHG | TEMPERATURE: 96.5 F | HEART RATE: 76 BPM | DIASTOLIC BLOOD PRESSURE: 75 MMHG

## 2024-02-21 DIAGNOSIS — M20.12 HALLUX VALGUS (ACQUIRED), LEFT FOOT: Primary | ICD-10-CM

## 2024-02-21 DIAGNOSIS — M20.11 HALLUX VALGUS (ACQUIRED), RIGHT FOOT: ICD-10-CM

## 2024-02-21 PROCEDURE — 99213 OFFICE O/P EST LOW 20 MIN: CPT | Performed by: PODIATRIST

## 2024-02-21 PROCEDURE — G0463 HOSPITAL OUTPT CLINIC VISIT: HCPCS

## 2024-02-21 ASSESSMENT — PAIN SCALES - GENERAL: PAINLEVEL: NO PAIN (0)

## 2024-02-21 NOTE — PROGRESS NOTES
Chief complaint: Patient presents with:  Musculoskeletal Problem: Corn follow up      History of Present Illness: This 24 year old female is seen for management of a corn on her LEFT foot and fo bilateral bunion pain.    She received CMOs through the orthotist, Sheridan Skinner in December, 2023. They are comfortable and they seem to reduce her pain in her feet a lot.     She says the bunion pain improved since she started wearing the CMOs.    She does still get back when she is not wearing the orthotics at home.    She still has discomfort in her bunions at times, especially when she is not wearing her shoes. Her LEFT foot is more tender than her RIGHT and her LEFT second toe is mildly running into and mildly lifting dorsally over the hallux. She is wondering how this can be treated and/or surgically corrected.    No further pedal complaints today.       /75 (BP Location: Left arm, Patient Position: Sitting, Cuff Size: Adult Regular)   Pulse 76   Temp (!) 96.5  F (35.8  C) (Tympanic)   LMP 01/31/2024   SpO2 98%     Patient Active Problem List   Diagnosis    Major depressive disorder, recurrent episode, moderate (H)    Anxiety state    Bipolar disorder, unspecified (H)    Mild intellectual disabilities    Mild intermittent asthma without complication       Past Surgical History:   Procedure Laterality Date    HEAD & NECK SURGERY      laser surgery to birthmark on left side of face    LAPAROSCOPIC CYSTECTOMY OVARIAN (ONCOLOGY) Left 08/22/2019    Procedure: LAPAROSCOPY TREAT LEFT OVARIAN CYST, MULTIPLE ASPIRATIONS LEFT OVARIAN CYST;  Surgeon: Frow, David Franke, MD;  Location: [facility]       Current Outpatient Medications   Medication    acetaminophen (TYLENOL) 500 MG tablet    albuterol (PROAIR HFA/PROVENTIL HFA/VENTOLIN HFA) 108 (90 Base) MCG/ACT inhaler    diclofenac (VOLTAREN) 1 % topical gel    docusate sodium (COLACE) 100 MG capsule    ibuprofen (ADVIL/MOTRIN) 600 MG tablet    ibuprofen (ADVIL/MOTRIN) 800 MG  tablet    LORazepam (ATIVAN) 0.5 MG tablet    nortriptyline (PAMELOR) 25 MG capsule    nystatin-triamcinolone (MYCOLOG II) 724673-8.1 UNIT/GM-% external cream    sertraline (ZOLOFT) 25 MG tablet    busPIRone (BUSPAR) 5 MG tablet     No current facility-administered medications for this visit.          Allergies   Allergen Reactions    Duloxetine Hcl     Lexapro [Escitalopram] Other (See Comments)     hypersensitivity to touch     Amoxicillin Other (See Comments)     Yeast infection    Duloxetine Other (See Comments)       Family History   Problem Relation Age of Onset    Bipolar Disorder Mother     Depression Mother     Heart Disease Father     Diabetes Other     Myocardial Infarction Paternal Grandfather     Lung Cancer Paternal Grandfather     Other - See Comments Paternal Aunt         mulitiple sclerosis    Breast Cancer Maternal Grandfather         x 2       Social History     Socioeconomic History    Marital status: Single   Tobacco Use    Smoking status: Never    Smokeless tobacco: Never   Vaping Use    Vaping Use: Never used   Substance and Sexual Activity    Alcohol use: No    Drug use: No    Sexual activity: Yes     Partners: Male     Birth control/protection: None   Social History Narrative    Parent relationship     Language spoken english    Primary residence The patient lives with mother.    School name [employer]    Grade level fifth     Performance A's and B's     Social Determinants of Health     Financial Resource Strain: Low Risk  (10/4/2023)    Financial Resource Strain     Within the past 12 months, have you or your family members you live with been unable to get utilities (heat, electricity) when it was really needed?: No   Food Insecurity: High Risk (10/4/2023)    Food Insecurity     Within the past 12 months, did you worry that your food would run out before you got money to buy more?: Yes     Within the past 12 months, did the food you bought just not last and you didn t have  money to get more?: Yes   Transportation Needs: High Risk (10/4/2023)    Transportation Needs     Within the past 12 months, has lack of transportation kept you from medical appointments, getting your medicines, non-medical meetings or appointments, work, or from getting things that you need?: Yes   Interpersonal Safety: Low Risk  (10/4/2023)    Interpersonal Safety     Do you feel physically and emotionally safe where you currently live?: Yes     Within the past 12 months, have you been hit, slapped, kicked or otherwise physically hurt by someone?: No     Within the past 12 months, have you been humiliated or emotionally abused in other ways by your partner or ex-partner?: No   Housing Stability: Low Risk  (10/4/2023)    Housing Stability     Do you have housing? : Yes     Are you worried about losing your housing?: No       ROS: 10 point ROS neg other than the symptoms noted above in the HPI.  EXAM  Constitutional: healthy, alert, and no distress    Psychiatric: mentation appears normal and affect normal/bright    VASCULAR:  -Dorsalis pedis pulse +2/4 b/l  -Posterior tibial pulse +2/4 b/l  -Capillary refill time < 3 seconds to b/l hallux  NEURO:  -Light touch sensation intact to b/l plantar forefoot  DERM:  -Skin temperature, texture and turgor WNL b/l  -Minimal hyperkeratotic lesion on the LEFT medial second toe DIPJ  MSK:  -No further pain on palpation to the LEFT medial second toe DIPJ [minimal] hyperkeratotic lesion   -Lateral deviation of hallux with medial deviation of 1st metatarsal, bilaterally, flexible deformity bilaterally (LEFT hallux mildly more deviated laterally than RIGHT hallux)  -Prominent bony prominence to dorsal and medial 1st metatarsal head, bilaterally   -Moderate decrease in arch height while patient is NWB, bilaterally   -Muscle strength of ankles +5/5 for dorsiflexion, plantarflexion, ABDUction and ADDuction  b/l    ============================================================    ASSESSMENT:  (M20.12) Hallux valgus (acquired), left foot  (primary encounter diagnosis)    (M20.11) Hallux valgus (acquired), right foot        PLAN:  -Patient evaluated and examined. Treatment options discussed with no educational barriers noted.  -Patient has not recently had pain from her LEFT distal medial second toe DIPJ callus. She tried a toe sleeve, but it was too tight on her foot and she didn't like it.    Hallux valgus / bunions:   -Discussed causes and treatment options for bunion deformities:  ---Conservative treatment options consist of wider shoe gear and orthotics +/- padding/splinting to accommodate the bunion. A crest pad can hold down a dorsiflexed second digit, a toe spacer can help separate the hallux and second digit, and silicone bunion sleeve can help pad the bunion and prevent painful rubbing in shoe gear. This will not correct the bunion deformity, but may help decrease pain.  ---Discussed splinting options. Patient may try a toe sleeve with a built-in toe spacer. This may attach to the second toe, great toe, or both. Discussed a bunion splint and how this can be worn to slow the lateral angulation of the great toe. Any splint of sleeve should be removed at night.  -Continue CMOs  -Discussed bunion surgery in detail including the hardware and the post-op recovery time. She would be offloaded for six weeks. She is advised to avoid surgery unless one or both bunions start causing her consistent, daily pain. Her CMOs are causing her moderate comfort at this time. She may call at any time if the bunion pain is getting worse. She is in agreement with this plan.    -Custom inserts dispensed in December, 2023, through the orthotist, Sheridan Skinner.    -This is an acute, uncomplicated illness/injury with OTC treatment options and major elective surgical treatment options reviewed.    -Patient in agreement with the above treatment  plan and all of patient's questions were answered.      Return to clinic as needed        Dyana Ingram DPM

## 2024-02-21 NOTE — PATIENT INSTRUCTIONS
Consider looking for these types of pads / spacers for the toes:    -Toe sleeve with toe spacer    -Bunion splint (Darco is an example of one brand)

## 2024-02-23 ENCOUNTER — PATIENT OUTREACH (OUTPATIENT)
Dept: CARE COORDINATION | Facility: OTHER | Age: 24
End: 2024-02-23

## 2024-02-23 NOTE — PROGRESS NOTES
Clinic Care Coordination Contact  Care Team Conversations    Received note from after visit with Orin Dumont.  Patient referred to PT and will need some assistance with coordination.    PT appointment scheduled 2/28/24 @ 1:30pm in [place].    RN CC contacted patient to inquire if she has transportation available for appointment in [place].  Patient states she does not.      RN CC will inquire if PT can be done here at [facility] or will assist in arrangement of transportation.

## 2024-02-26 NOTE — PROGRESS NOTES
Clinic Care Coordination Contact  Care Team Conversations      RN CC called [employer] to arrange transportation for patient PT appointment on 2/28/24 1:30pm arrival. This will count as the one short notice ride of the month allowed.  Patient will need to call for when ready for .    RN CC called to [employer] to verify ride  time as advised by Mikey Ayala.  12:50pm  confirmed.    RN CC called to patient to notify of the above.  RN CC advised patient to schedule future appointments for the following week to allow enough time to secure transportation.

## 2024-02-28 ENCOUNTER — TELEPHONE (OUTPATIENT)
Dept: FAMILY MEDICINE | Facility: OTHER | Age: 24
End: 2024-02-28

## 2024-02-28 ENCOUNTER — THERAPY VISIT (OUTPATIENT)
Dept: PHYSICAL THERAPY | Facility: HOSPITAL | Age: 24
End: 2024-02-28
Attending: NURSE PRACTITIONER
Payer: COMMERCIAL

## 2024-02-28 DIAGNOSIS — F43.10 PTSD (POST-TRAUMATIC STRESS DISORDER): ICD-10-CM

## 2024-02-28 DIAGNOSIS — F41.1 ANXIETY STATE: ICD-10-CM

## 2024-02-28 DIAGNOSIS — S16.1XXA STRAIN OF NECK MUSCLE, INITIAL ENCOUNTER: ICD-10-CM

## 2024-02-28 DIAGNOSIS — F31.4 BIPOLAR DISORDER, CURRENT EPISODE DEPRESSED, SEVERE, WITHOUT PSYCHOTIC FEATURES (H): Primary | ICD-10-CM

## 2024-02-28 DIAGNOSIS — H92.03 REFERRED OTALGIA OF BOTH EARS: ICD-10-CM

## 2024-02-28 DIAGNOSIS — F33.1 MAJOR DEPRESSIVE DISORDER, RECURRENT EPISODE, MODERATE (H): ICD-10-CM

## 2024-02-28 DIAGNOSIS — F70 MILD INTELLECTUAL DISABILITIES: ICD-10-CM

## 2024-02-28 PROCEDURE — 97530 THERAPEUTIC ACTIVITIES: CPT | Mod: GP

## 2024-02-28 PROCEDURE — 97161 PT EVAL LOW COMPLEX 20 MIN: CPT | Mod: GP

## 2024-02-28 PROCEDURE — 97110 THERAPEUTIC EXERCISES: CPT | Mod: GP

## 2024-02-28 NOTE — PROGRESS NOTES
PHYSICAL THERAPY EVALUATION  Type of Visit: Evaluation    See electronic medical record for Abuse and Falls Screening details.    Subjective       Presenting condition or subjective complaint: tmj  Date of onset: 02/16/24    Relevant medical history: Asthma; Bladder or bowel problems; Depression; Mental Illness; Migraines or headaches; Severe headaches   PAST MEDICAL HISTORY:   Past Medical History:   Diagnosis Date    Anxiety state 1/6/2012    Overview:  IMO Update 10/11    Attention deficit disorder of childhood without me 03/05/2007    Bipolar disorder, unspecified (H) 5/5/2016    Depressive disorder     Facial hemangioma 07/23/2012    Major depressive disorder, recurrent episode, moderate (H) 12/26/2016    Menorrhagia with irregular cycle 1/19/2021    Mild intellectual disabilities 1/5/2012    IMO Update 10/11    Mild intermittent asthma with status asthmaticus 8/18/2022    Nasal turbinate hypertrophy 07/23/2012    Tonsillar hypertrophy 07/23/2012       PAST SURGICAL HISTORY:   Past Surgical History:   Procedure Laterality Date    HEAD & NECK SURGERY      laser surgery to birthmark on left side of face    LAPAROSCOPIC CYSTECTOMY OVARIAN (ONCOLOGY) Left 08/22/2019    Procedure: LAPAROSCOPY TREAT LEFT OVARIAN CYST, MULTIPLE ASPIRATIONS LEFT OVARIAN CYST;  Surgeon: Frow, David Franke, MD;  Location: [facility]       FAMILY HISTORY:   Family History   Problem Relation Age of Onset    Bipolar Disorder Mother     Depression Mother     Heart Disease Father     Diabetes Other     Myocardial Infarction Paternal Grandfather     Lung Cancer Paternal Grandfather     Other - See Comments Paternal Aunt         mulitiple sclerosis    Breast Cancer Maternal Grandfather         x 2       SOCIAL HISTORY:   Social History     Tobacco Use    Smoking status: Never    Smokeless tobacco: Never   Substance Use Topics    Alcohol use: No         Prior diagnostic imaging/testing results: Other physical look   Prior therapy history for the same  diagnosis, illness or injury: Yes      Prior Level of Function  Transfers: Independent  Ambulation: Independent  ADL: Independent  IADL: Childcare, Housekeeping, Laundry, Meal preparation, Work    Living Environment  Social support: With a significant other or spouse   Type of home: Apartment/condo   Stairs to enter the home: No       Ramp: No   Stairs inside the home: No       Help at home: None    Employment: Yes residential support staff  Hobbies/Interests: drawing coloring reading watching tv walking working out    Patient goals for therapy: eat, sleep comfortably    Pain assessment: Pain present     Objective     Headache- Yes, aggravated by jaw pain  Jaw Noises- Left click; Intermittent locking open  Modified Diet- Yes  Parafunctal Habits- Yes, nail chewing, cheeks, lips  Sleep- Prefers stomach sleeping, non-restorative  Caffeine- Energy   Alcohol- No concerns  Smoking- No  Exercise- Endorses high physical activity, no current exercise  Stress- High      OBJECTIVE   Observation: Patient presents to department in no acute distress.     Palpation:   Deep [place]- Right ++, Left ++  Temporalis- Right ++, Left ++  EO MP- Right +, Left +  SCM- Right -, Left -  Suboccipitals- Right +, Left +      Range of Motion:   TMJ Range of Motion - therabite (mm)  Pain Free Opening- 25  Maximum Opening- 60  Left Laterotrusion- 12  Right Laterotrusion- 12  Opening Pattern- Rightward C-curve, uneven and early protrusion (Left >R)  Posture: Forward head, protracted shoulders       Cervical Spine Range of Motion   Active Motion: WNL for TMJ assessment and treatment  Modified VAT: WNL  Right upper extremity range of motion: WNL   Left upper extremity range of motion: WNL          Assessment & Plan   CLINICAL IMPRESSIONS  Medical Diagnosis: Referred otalgia of both ears (H92.03), Strain of neck muscle, initial encounter (S16.1XXA)    Treatment Diagnosis: TMJ Dysfunction; Cervicalgia   Impression/Assessment: Patient is a 24 year old  female with TMJ dysfunction and pain complaints that are consistent with left disc displacement with reduction and intermittent locking, myalgia of masticatory and cervical muscles, bilateral TMJ arthralgia, and tension type headaches likely attributable to TMD.  The following significant findings have been identified: Pain, Impaired muscle performance, Decreased activity tolerance, and Impaired posture. These impairments interfere with their ability to perform self care tasks, work tasks, and recreational activities as compared to previous level of function.     Clinical Decision Making (Complexity):  Clinical Presentation: Stable/Uncomplicated  Clinical Presentation Rationale: based on medical and personal factors listed in PT evaluation  Clinical Decision Making (Complexity): Low complexity    PLAN OF CARE  Treatment Interventions:  Modalities: Cryotherapy, Dry Needling, E-stim, Hot Pack  Interventions: Manual Therapy, Neuromuscular Re-education, Therapeutic Activity, Therapeutic Exercise, Self-Care/Home Management    Long Term Goals     PT Goal 1  Goal Identifier: STG 1  Goal Description: Patient will be independent with a short-term home exercise program.  Target Date: 03/27/24  PT Goal 2  Goal Identifier: STG 2  Goal Description: Patient will understand and demonstrate improved posture and techniques such that patient places less strain over cervical spine.  Target Date: 03/27/24  PT Goal 3  Goal Identifier: LTG 1  Goal Description: Improve score on utilized outcome measures to correlate with clinically significant change.  Target Date: 04/24/24  PT Goal 4  Goal Identifier: LTG 2  Goal Description: Patient will endorse confidence in ability to manage home exercise program independently to promote continued progression and management of jaw and neck pain symptoms following discharge from PT services.  Target Date: 04/24/24      Frequency of Treatment: 1x/week tapered to discharge  Duration of Treatment: 8  weeks    Education Assessment:   Learner/Method: Patient;Listening;Reading;Demonstration;Pictures/Video;No Barriers to Learning    Risks and benefits of evaluation/treatment have been explained.   Patient/Family/caregiver agrees with Plan of Care.     Evaluation Time:     PT Eval, Low Complexity Minutes (59930): 20       Signing Clinician: Marilyn Zuniga PT      [facility]                                                                                   OUTPATIENT PHYSICAL THERAPY      PLAN OF TREATMENT FOR OUTPATIENT REHABILITATION   Patient's Last Name, First Name, Meg Manning YOB: 2000   Provider's Name   [facility]   Medical Record No.  [id number]     Onset Date: 02/16/24  Start of Care Date: 02/28/24     Medical Diagnosis:  Referred otalgia of both ears (H92.03), Strain of neck muscle, initial encounter (S16.1XXA)      PT Treatment Diagnosis:  TMJ Dysfunction; Cervicalgia Plan of Treatment  Frequency/Duration: 1x/week tapered to discharge/ 8 weeks    Certification date from 02/28/24 to 04/24/24         See note for plan of treatment details and functional goals     Marilyn Zuniga, PT, DPT, NRP, Cert. DN                         I CERTIFY THE NEED FOR THESE SERVICES FURNISHED UNDER        THIS PLAN OF TREATMENT AND WHILE UNDER MY CARE     (Physician attestation of this document indicates review and certification of the therapy plan).              Referring Provider:  Orin Dumont    Initial Assessment  See Epic Evaluation- Start of Care Date: 02/28/24

## 2024-03-01 NOTE — TELEPHONE ENCOUNTER
"Clinic Care Coordination Contact  Care Team Conversations    Patient was seen on 2/28/24 by physical therapy for evaluation and treatment plan.  Patient called to this writer to inform of dates of future appointments and requested assistance in setting up transportation to the appointment. Patient also asked referral for \"therapy\".  RN CC reviewed chart and did not see any referral but last ENT note stated that patient \"working on referral for CBT\".  RN CC asked patient if this is what she was referring to and she said yes.    RN CC called [employer] and set up transportation for appointments in March.  Will need to call [employer] on March 25th to arrange any transportation needed in April.  (Patient does have one PT appointment in April)  [employer] will be providing transportation for patient with a  time of 10:15am for her 11am appointments in [place] for PT. RN CC Verified transportation and  times with [employer].      RN CC placed call to patient to update on tranpsortation arrangements and  times.  Discussed therapy needs and patient is wanting to establish with a therapist that works with PTSD and works on coping skills.  Patient does not have a preference to a specific practice.  Will route to PCP for referral to be placed.  RN CC advised patient to call back to clinic with any questions or concerns or contact the mental health crisis line if needed.  Patient verbalizes understanding and thanks RN CC for assistance provided.    Chloe Maurer RN Care Coordinator  [facility]    "

## 2024-03-05 NOTE — TELEPHONE ENCOUNTER
Clinic Care Coordination Contact  Care Team Conversations    Patient called to clinic yesterday with an update on appointment date, time, and location.  Patient asked for assistance setting up a ride to this appointment.    RN CC requested transportation on [employer] website.  Will provide update to patient when transportation company and  time are verified.

## 2024-03-06 ENCOUNTER — THERAPY VISIT (OUTPATIENT)
Dept: PHYSICAL THERAPY | Facility: HOSPITAL | Age: 24
End: 2024-03-06
Attending: NURSE PRACTITIONER
Payer: COMMERCIAL

## 2024-03-06 DIAGNOSIS — S16.1XXA STRAIN OF NECK MUSCLE, INITIAL ENCOUNTER: ICD-10-CM

## 2024-03-06 DIAGNOSIS — H92.03 REFERRED OTALGIA OF BOTH EARS: Primary | ICD-10-CM

## 2024-03-06 PROCEDURE — 97110 THERAPEUTIC EXERCISES: CPT | Mod: GP

## 2024-03-06 PROCEDURE — 97112 NEUROMUSCULAR REEDUCATION: CPT | Mod: GP

## 2024-03-06 PROCEDURE — 999N000104 HC STATISTIC NO CHARGE

## 2024-03-15 NOTE — TELEPHONE ENCOUNTER
Chief Complaint:    Chief Complaint   Patient presents with   • Surgical Followup     follow up L3-4 lami  with full facetectomy & forminotomy. L3-5 PIF        History of Present Illness:  Beba Daniels is a 62 year old female with history of lumbar spondylolisthesis, stenosis with neurogenic claudication, and superimposed radiculopathy. Patient is status post less invasive L3-4 and L4-5 laminectomy with posterior instrumentation and fusion. Now approximately 2 weeks out.     Feels presenting complaint is: improving  Pain at rest: 6  Pain with activity: 10     Review of Systems:   General: Patient denies fever, chills, tiredness, malaise.  Eyes: Patient denies blurred vision, double vision, pain, burning and itching.   Immunologic: Patient denies hayfever, drug allergies.  Neurologic:Patient denies symptoms except as described in the HPI.   Endocrine: Patient denies excessive thirst, heat intolerance, lymph node enlargement.  Gastrointestinal: Patient denies abdominal pain, nausea/vomiting, indigestion/heartburn, diarrhea, constipation.  Cardiovascular: Patient denies chest pain, varicose veins, high blood pressure.  Skin: Patient denies skin rashes, boils, persistent itching, acne.  Musculoskeletal: As above.  ENT/Mouth: Patient denies ear infections, sore throats, sinus problems, hearing loss.  : Patient denies urine retention, painful urination, urinary frequency, blood in urine, nocturia.  Respiratory: Patient denies wheezing, frequent cough, shortness of breath.    Physical Exam:  Vitals:   Visit Vitals  Resp 16   Ht 4' 11\" (1.499 m)   Wt 94.6 kg (208 lb 8.9 oz)   LMP 08/14/2013   BMI 42.12 kg/m²      General: Well developed, nourished, Obese body habitus, comfortable appearing  Psych: Alert and Oriented x3, appropriate affect  Head: Normal cephalic, a-traumatic    Respiratory: Non-labored breathing  Cardiovascular: No peripheral edema  Abdomen: Soft, NT/ND  Vascular: Palpable dorsalis pedis  Message left for patient to return call for transportation update.    Transportation scheduled for appointment at [facility] on 3/26/24  Here are the details of the ride:    Ride date:03/26/2024    Pickup time:12:00 PM    Pick-up address:  [address] [place] [address]    Appointment time:12:30 PM    Appointment address:  [address] [place] [id number]    Transportation company:  [employer]    Phone number:  [phone]    Call the transportation company 24-48 hours before your ride to confirm with them.    Please call the transportation company for your return ride.  Allow up to 30 minutes from the time that you call for the return ride to arrive.    To change or cancel your ride call [employer] at [phone], Monday-Friday 8 am - 5 pm.   pulses  Neuro: CN 2-12 in tact  Gait:  antalgic  Thoraco-Lumbar Spine/Pelvis: Incision without erythema, edema, drainage  Motor:  Lower Extremity    Left Right   Iliopsoas 5/5 5/5   Quadriceps 4/5 5/5   Hamstrings 4/5 5/5   Tibialis anterior 4/5 5/5   Extensor hallucis longus 5/5 5/5   Gastrocnemeus/  soleus 5/5 5/5   Sensation:  Lower Extremity     Left Right   L1 Inguinal ligamnet intact intact   L2 Medial thigh intact intact   L3 Medial thigh intact intact   L4 Medial foot intact intact   L5 Web space big toe/2nd toe intact intact   S1 Lateral foot intact intact     Independent Interpretation of Diagnostic Tests/Imaging:  Diagnostic tests/ imaging personally reviewed and discussed with patient:   2-3 view lumbar x-rays   Findings:   Hardware intact      Assessment and Medical Decision Making:    The primary encounter diagnosis was Spondylolisthesis of lumbar region. Diagnoses of Lumbar stenosis with neurogenic claudication and Lumbar radiculopathy were also pertinent to this visit.    62 year old female history of lumbar spondylolisthesis, stenosis with neurogenic claudication, and superimposed radiculopathy. Status post less invasive L3-4 and L4-5 laminectomy with posterior instrumentation and fusion. Now approximately 2 weeks out.  Patient noting improvement in leg pains.  Now most bothered by back and incisional pain. Will continue to maintain current restrictions and LSO with activity. Follow up in 6 weeks. Will consider physical therapy at that time.       Instructions:  Instructed the patient that in the event that symptoms change to contact the office or present to the local emergency room     Balwinder Faria PA-C   Supervising Physician: Reid Soria MD   5/6/2022

## 2024-03-18 NOTE — TELEPHONE ENCOUNTER
Telephone call placed to patient and updated on transportation arrangements for HS on 3/26/24.  Reminded patient to call in advance if needing to cancel.

## 2024-03-25 ENCOUNTER — PATIENT OUTREACH (OUTPATIENT)
Dept: CARE COORDINATION | Facility: OTHER | Age: 24
End: 2024-03-25

## 2024-03-25 NOTE — PROGRESS NOTES
Clinic Care Coordination Contact  Care Team Conversations    Reviewed chart for remaining appointments for physical therapy in April.  Requested transportation from [employer] via online request.  Will update patient when ride is approved and to inquire about any additional transportation needs.

## 2024-03-28 ENCOUNTER — MEDICAL CORRESPONDENCE (OUTPATIENT)
Dept: HEALTH INFORMATION MANAGEMENT | Facility: HOSPITAL | Age: 24
End: 2024-03-28

## 2024-03-29 ENCOUNTER — TELEPHONE (OUTPATIENT)
Dept: FAMILY MEDICINE | Facility: OTHER | Age: 24
End: 2024-03-29

## 2024-03-29 NOTE — TELEPHONE ENCOUNTER
"FYI- [place]  Depression Scale score elevated at 22 (over 10 is elevated). + thought of self harm (\"yes, quite often\"). Denies plan for desire for self harm. States chronic.    Alisha Santoro, APRN, CNP    "

## 2024-05-20 ENCOUNTER — OFFICE VISIT (OUTPATIENT)
Dept: FAMILY MEDICINE | Facility: OTHER | Age: 24
End: 2024-05-20
Attending: NURSE PRACTITIONER
Payer: COMMERCIAL

## 2024-05-20 ENCOUNTER — TELEPHONE (OUTPATIENT)
Dept: FAMILY MEDICINE | Facility: OTHER | Age: 24
End: 2024-05-20

## 2024-05-20 VITALS
RESPIRATION RATE: 16 BRPM | WEIGHT: 148.5 LBS | SYSTOLIC BLOOD PRESSURE: 104 MMHG | HEART RATE: 74 BPM | BODY MASS INDEX: 24.74 KG/M2 | TEMPERATURE: 97.2 F | HEIGHT: 65 IN | DIASTOLIC BLOOD PRESSURE: 56 MMHG | OXYGEN SATURATION: 100 %

## 2024-05-20 DIAGNOSIS — H61.23 EXCESSIVE CERUMEN IN BOTH EAR CANALS: ICD-10-CM

## 2024-05-20 DIAGNOSIS — H65.191 OTHER NON-RECURRENT ACUTE NONSUPPURATIVE OTITIS MEDIA OF RIGHT EAR: Primary | ICD-10-CM

## 2024-05-20 PROCEDURE — G0463 HOSPITAL OUTPT CLINIC VISIT: HCPCS

## 2024-05-20 PROCEDURE — G2211 COMPLEX E/M VISIT ADD ON: HCPCS | Performed by: NURSE PRACTITIONER

## 2024-05-20 PROCEDURE — 99213 OFFICE O/P EST LOW 20 MIN: CPT | Performed by: NURSE PRACTITIONER

## 2024-05-20 RX ORDER — AZITHROMYCIN 250 MG/1
TABLET, FILM COATED ORAL
Qty: 6 TABLET | Refills: 0 | Status: SHIPPED | OUTPATIENT
Start: 2024-05-20 | End: 2024-05-25

## 2024-05-20 RX ORDER — CEFACLOR 500 MG
500 CAPSULE ORAL 3 TIMES DAILY
Qty: 30 CAPSULE | Refills: 0 | Status: SHIPPED | OUTPATIENT
Start: 2024-05-20 | End: 2024-05-20

## 2024-05-20 ASSESSMENT — ASTHMA QUESTIONNAIRES
QUESTION_2 LAST FOUR WEEKS HOW OFTEN HAVE YOU HAD SHORTNESS OF BREATH: ONCE OR TWICE A WEEK
QUESTION_3 LAST FOUR WEEKS HOW OFTEN DID YOUR ASTHMA SYMPTOMS (WHEEZING, COUGHING, SHORTNESS OF BREATH, CHEST TIGHTNESS OR PAIN) WAKE YOU UP AT NIGHT OR EARLIER THAN USUAL IN THE MORNING: NOT AT ALL
ACT_TOTALSCORE: 19
QUESTION_1 LAST FOUR WEEKS HOW MUCH OF THE TIME DID YOUR ASTHMA KEEP YOU FROM GETTING AS MUCH DONE AT WORK, SCHOOL OR AT HOME: SOME OF THE TIME
QUESTION_4 LAST FOUR WEEKS HOW OFTEN HAVE YOU USED YOUR RESCUE INHALER OR NEBULIZER MEDICATION (SUCH AS ALBUTEROL): ONCE A WEEK OR LESS
ACT_TOTALSCORE: 19
QUESTION_5 LAST FOUR WEEKS HOW WOULD YOU RATE YOUR ASTHMA CONTROL: SOMEWHAT CONTROLLED

## 2024-05-20 ASSESSMENT — PATIENT HEALTH QUESTIONNAIRE - PHQ9
SUM OF ALL RESPONSES TO PHQ QUESTIONS 1-9: 20
SUM OF ALL RESPONSES TO PHQ QUESTIONS 1-9: 20
10. IF YOU CHECKED OFF ANY PROBLEMS, HOW DIFFICULT HAVE THESE PROBLEMS MADE IT FOR YOU TO DO YOUR WORK, TAKE CARE OF THINGS AT HOME, OR GET ALONG WITH OTHER PEOPLE: EXTREMELY DIFFICULT

## 2024-05-20 ASSESSMENT — PAIN SCALES - GENERAL: PAINLEVEL: SEVERE PAIN (6)

## 2024-05-20 NOTE — LETTER
My Asthma Action Plan    Name: Meg Diallo   YOB: 2000  Date: 5/20/2024   My doctor: Tiara Hidalgo NP   My clinic: [facility]        My Rescue Medicine:   Albuterol inhaler (Proair/Ventolin/Proventil HFA)  2-4 puffs EVERY 4 HOURS as needed. Use a spacer if recommended by your provider.   My Asthma Severity:   Intermittent / Exercise Induced  Know your asthma triggers: pollens, mold, humidity, strong odors and fumes, and exercise or sports  smoke  dust mites  pollens  animal dander  mold  humidity  strong odors and fumes  exercise or sports  cold air          GREEN ZONE   Good Control  I feel good  No cough or wheeze  Can work, sleep and play without asthma symptoms       Take your asthma control medicine every day.     If exercise triggers your asthma, take your rescue medication  15 minutes before exercise or sports, and  During exercise if you have asthma symptoms  Spacer to use with inhaler: If you have a spacer, make sure to use it with your inhaler             YELLOW ZONE Getting Worse  I have ANY of these:  I do not feel good  Cough or wheeze  Chest feels tight  Wake up at night   Keep taking your Green Zone medications  Start taking your rescue medicine:  every 20 minutes for up to 1 hour. Then every 4 hours for 24-48 hours.  If you stay in the Yellow Zone for more than 12-24 hours, contact your doctor.  If you do not return to the Green Zone in 12-24 hours or you get worse, start taking your oral steroid medicine if prescribed by your provider.           RED ZONE Medical Alert - Get Help  I have ANY of these:  I feel awful  Medicine is not helping  Breathing getting harder  Trouble walking or talking  Nose opens wide to breathe       Take your rescue medicine NOW  If your provider has prescribed an oral steroid medicine, start taking it NOW  Call your doctor NOW  If you are still in the Red Zone after 20 minutes and you have not reached your doctor:  Take  your rescue medicine again and  Call 911 or go to the emergency room right away    See your regular doctor within 2 weeks of an Emergency Room or Urgent Care visit for follow-up treatment.          Annual Reminders:  Meet with Asthma Educator,  Flu Shot in the Fall, consider Pneumonia Vaccination for patients with asthma (aged 19 and older).    Pharmacy:    [id number] - [place], [place] - [address] [address] [address]  [facility] - [place], [place] - [address] [facility]    Electronically signed by Tiara Hidalgo NP   Date: 05/20/24                    Asthma Triggers  How To Control Things That Make Your Asthma Worse    Triggers are things that make your asthma worse.  Look at the list below to help you find your triggers and   what you can do about them. You can help prevent asthma flare-ups by staying away from your triggers.      Trigger                                                          What you can do   Cigarette Smoke  Tobacco smoke can make asthma worse. Do not allow smoking in your home, car or around you.  Be sure no one smokes at a child s day care or school.  If you smoke, ask your health care provider for ways to help you quit.  Ask family members to quit too.  Ask your health care provider for a referral to Quit Plan to help you quit smoking, or call 3-982-356-PLAN.     Colds, Flu, Bronchitis  These are common triggers of asthma. Wash your hands often.  Don t touch your eyes, nose or mouth.  Get a flu shot every year.     Dust Mites  These are tiny bugs that live in cloth or carpet. They are too small to see. Wash sheets and blankets in hot water every week.   Encase pillows and mattress in dust mite proof covers.  Avoid having carpet if you can. If you have carpet, vacuum weekly.   Use a dust mask and HEPA vacuum.   Pollen and Outdoor Mold  Some people are allergic to trees, grass, or weed pollen, or molds. Try to keep your windows closed.  Limit time out doors when pollen count is  high.   Ask you health care provider about taking medicine during allergy season.     Animal Dander  Some people are allergic to skin flakes, urine or saliva from pets with fur or feathers. Keep pets with fur or feathers out of your home.    If you can t keep the pet outdoors, then keep the pet out of your bedroom.  Keep the bedroom door closed.  Keep pets off cloth furniture and away from stuffed toys.     Mice, Rats, and Cockroaches  Some people are allergic to the waste from these pests.   Cover food and garbage.  Clean up spills and food crumbs.  Store grease in the refrigerator.   Keep food out of the bedroom.   Indoor Mold  This can be a trigger if your home has high moisture. Fix leaking faucets, pipes, or other sources of water.   Clean moldy surfaces.  Dehumidify basement if it is damp and smelly.   Smoke, Strong Odors, and Sprays  These can reduce air quality. Stay away from strong odors and sprays, such as perfume, powder, hair spray, paints, smoke incense, paint, cleaning products, candles and new carpet.   Exercise or Sports  Some people with asthma have this trigger. Be active!  Ask your doctor about taking medicine before sports or exercise to prevent symptoms.    Warm up for 5-10 minutes before and after sports or exercise.     Other Triggers of Asthma  Cold air:  Cover your nose and mouth with a scarf.  Sometimes laughing or crying can be a trigger.  Some medicines and food can trigger asthma.

## 2024-05-20 NOTE — PROGRESS NOTES
Assessment & Plan     1. Other non-recurrent acute nonsuppurative otitis media of right ear  Tylenol, ibuprofen per package directions for pain  Warm compress to ear   - cefaclor (CECLOR) 500 MG capsule; Take 1 capsule (500 mg) by mouth 3 times daily for 10 days  Dispense: 30 capsule; Refill: 0    2. Excessive cerumen in both ear canals  - Adult ENT  Referral; Future    Follow-up if no improvement or any worsening.     The longitudinal plan of care for the diagnosis(es)/condition(s) as documented were addressed during this visit. Due to the added complexity in care, I will continue to support Meg in the subsequent management and with ongoing continuity of care.    Tiara Hidalgo,   Certified Adult Nurse Practitioner  [phone]      Subjective   Meg is a 24 year old, presenting for the following health issues:  Ear Problem    HPI     Acute Illness  Acute illness concerns: Ear pain  Onset/Duration: last Friday 5/17/24  Symptoms:  Fever: No  Chills/Sweats: No  Headache (location?): YES  Sinus Pressure: YES  Conjunctivitis:  No  Ear Pain: YES: right  Rhinorrhea: No  Congestion: No  Sore Throat: No  Cough: no  Wheeze: No  Decreased Appetite: No  Nausea: No  Vomiting: No  Diarrhea: No  Dysuria/Freq.: No  Dysuria or Hematuria: No  Fatigue/Achiness: No  Sick/Strep Exposure: No  Therapies tried and outcome: Tylenol, Ibuprofe, warm compress to ear.       Recent Labs   Lab Test 11/29/23  1355 08/13/23  0325 01/19/23  1353 05/11/21  0000 03/24/21  0000 01/29/21  0000 01/19/21  1428 08/21/20  0000 06/03/20  1047 05/08/18  1027 06/22/17  2357   A1C  --   --   --   --   --   --  5.1  --   --   --   --    ALT  --  25  --  15  --  15  --    < > 24   < >  --    CR 0.66 0.48*  --  0.81   < > 0.77  --    < > 0.68   < > 0.41*   GFRESTIMATED >90 >90  --  >60   < > >60  --    < > >90   < > >90  Non  GFR Calc     GFRESTBLACK  --   --   --   --   --   --   --   --  >90  --  >90    "GFR Calc     POTASSIUM 4.0 3.5  --  3.6   < > 3.0*  --    < > 3.6   < > 3.8   TSH  --   --  0.88  --   --  4.17* 1.46  --   --    < >  --     < > = values in this interval not displayed.      BP Readings from Last 3 Encounters:   05/20/24 104/56   02/21/24 110/75   02/16/24 121/80    Wt Readings from Last 3 Encounters:   05/20/24 67.4 kg (148 lb 8 oz)   02/16/24 67.5 kg (148 lb 13 oz)   01/31/24 67.5 kg (148 lb 12.8 oz)                  Review of Systems  Constitutional, neuro, ENT, endocrine, pulmonary, cardiac, gastrointestinal, genitourinary, musculoskeletal, integument and psychiatric systems are negative, except as otherwise noted.      Objective    /56 (BP Location: Left arm, Patient Position: Chair, Cuff Size: Adult Regular)   Pulse 74   Temp 97.2  F (36.2  C) (Tympanic)   Resp 16   Ht 1.651 m (5' 5\")   Wt 67.4 kg (148 lb 8 oz)   SpO2 100%   BMI 24.71 kg/m    Body mass index is 24.71 kg/m .  Physical Exam   GENERAL: alert and no distress  HENT: normal cephalic/atraumatic, right ear: partially covered by cerumen, the visualized portion is erythematous, left ear: occluded with wax, nasal mucosa edematous , and rhinorrhea clear  NECK: no adenopathy, no asymmetry, masses, or scars  RESP: lungs clear to auscultation - no rales, rhonchi or wheezes  CV: regular rate and rhythm, normal S1 S2, no S3 or S4, no murmur, click or rub, no peripheral edema  MS: no gross musculoskeletal defects noted, no edema  PSYCH: mentation appears normal, affect normal/bright            Signed Electronically by: Tiara Hidalgo NP    "

## 2024-05-20 NOTE — TELEPHONE ENCOUNTER
10:24 AM    Reason for Call: Phone Call    Description: Pt called to inform PCP that pharmacy is out of antibiotic PCP prescribed at appt today (05/20/2024).  Pt wanting to discuss next options to get this medication filled.     Was an appointment offered for this call? No  If yes : Appointment type              Date    Preferred method for responding to this message: Telephone Call  What is your phone number ?[phone]     If we cannot reach you directly, may we leave a detailed response at the number you provided? Yes    Can this message wait until your PCP/provider returns, if available today? Not applicable    America Garvin

## 2024-05-20 NOTE — PATIENT INSTRUCTIONS
Assessment & Plan     1. Other non-recurrent acute nonsuppurative otitis media of right ear  Tylenol, ibuprofen per package directions for pain  Warm compress to ear   - cefaclor (CECLOR) 500 MG capsule; Take 1 capsule (500 mg) by mouth 3 times daily for 10 days  Dispense: 30 capsule; Refill: 0    2. Excessive cerumen in both ear canals  - Adult ENT  Referral; Future    Follow-up if no improvement or any worsening.       Tiara Hidalgo,   Certified Adult Nurse Practitioner  [phone]

## 2024-05-24 ENCOUNTER — OFFICE VISIT (OUTPATIENT)
Dept: FAMILY MEDICINE | Facility: OTHER | Age: 24
End: 2024-05-24
Attending: NURSE PRACTITIONER
Payer: COMMERCIAL

## 2024-05-24 VITALS
OXYGEN SATURATION: 100 % | HEART RATE: 83 BPM | BODY MASS INDEX: 24.63 KG/M2 | RESPIRATION RATE: 18 BRPM | TEMPERATURE: 97.7 F | SYSTOLIC BLOOD PRESSURE: 124 MMHG | WEIGHT: 148 LBS | DIASTOLIC BLOOD PRESSURE: 81 MMHG

## 2024-05-24 DIAGNOSIS — F33.1 MAJOR DEPRESSIVE DISORDER, RECURRENT EPISODE, MODERATE (H): ICD-10-CM

## 2024-05-24 DIAGNOSIS — F41.9 ANXIETY: ICD-10-CM

## 2024-05-24 DIAGNOSIS — H60.391 INFECTIVE OTITIS EXTERNA, RIGHT: Primary | ICD-10-CM

## 2024-05-24 PROCEDURE — G0463 HOSPITAL OUTPT CLINIC VISIT: HCPCS

## 2024-05-24 PROCEDURE — 99213 OFFICE O/P EST LOW 20 MIN: CPT | Performed by: NURSE PRACTITIONER

## 2024-05-24 RX ORDER — BUSPIRONE HYDROCHLORIDE 5 MG/1
10 TABLET ORAL
Qty: 120 TABLET | Refills: 3 | Status: SHIPPED | OUTPATIENT
Start: 2024-05-24

## 2024-05-24 RX ORDER — SERTRALINE HYDROCHLORIDE 25 MG/1
25 TABLET, FILM COATED ORAL
Qty: 30 TABLET | Refills: 3 | Status: SHIPPED | OUTPATIENT
Start: 2024-05-24

## 2024-05-24 RX ORDER — OFLOXACIN 3 MG/ML
10 SOLUTION AURICULAR (OTIC) DAILY
Qty: 10 ML | Refills: 0 | Status: SHIPPED | OUTPATIENT
Start: 2024-05-24 | End: 2024-06-13

## 2024-05-24 ASSESSMENT — PAIN SCALES - GENERAL: PAINLEVEL: MODERATE PAIN (5)

## 2024-05-24 NOTE — PROGRESS NOTES
Assessment & Plan     Infective otitis externa, right  - ofloxacin (FLOXIN) 0.3 % otic solution; Place 10 drops into the right ear daily    Major depressive disorder, recurrent episode, moderate (H)  - sertraline (ZOLOFT) 25 MG tablet; Take 1 tablet (25 mg) by mouth daily at 2 pm    Anxiety  New medication education completed with potential risks, benefits, administration, and potential side effects.   - busPIRone (BUSPAR) 5 MG tablet; Take 2 tablets (10 mg) by mouth 2 times daily                No follow-ups on file.    Darnell Weaver is a 24 year old, presenting for the following health issues:  Ear Problem        5/24/2024     1:22 PM   Additional Questions   Roomed by Yael SPRING   Accompanied by None     Ear Problem       Concern - Right Ear  Onset: 5/17/24  Description: draining,popping  Intensity: severe  Progression of Symptoms:  worsening  Accompanying Signs & Symptoms: dizzy  Previous history of similar problem: yes  Precipitating factors:        Worsened by: none  Alleviating factors:        Improved by: none  Therapies tried and outcome: Zithromax             9/21/2023     1:34 PM 10/16/2023    10:53 AM 11/29/2023     1:09 PM   RUFINA-7 SCORE   Total Score  15 (severe anxiety) 21 (severe anxiety)   Total Score 21 15 21            11/29/2023     1:09 PM 2/16/2024     9:27 AM 5/20/2024     9:28 AM   PHQ   PHQ-9 Total Score 25 20 20   Q9: Thoughts of better off dead/self-harm past 2 weeks More than half the days More than half the days Not at all   F/U: Thoughts of suicide or self-harm No     F/U: Safety concerns No              Objective    /81 (BP Location: Right arm, Patient Position: Sitting, Cuff Size: Adult Regular)   Pulse 83   Temp 97.7  F (36.5  C) (Tympanic)   Resp 18   Wt 67.1 kg (148 lb)   SpO2 100%   Breastfeeding No   BMI 24.63 kg/m    Body mass index is 24.63 kg/m .  Physical Exam   GENERAL: alert and no distress  EYES: Eyes grossly normal to inspection, PERRL and conjunctivae  and sclerae normal  HENT: normal cephalic/atraumatic, right ear: effusion remains present without bulging or erythema. Canal is boggy and tender. TM intact. Left ear: normal: no effusions, no erythema, normal landmarks, nose and mouth without ulcers or lesions, oropharynx clear, and oral mucous membranes moist  NECK: no adenopathy, no asymmetry, masses, or scars  RESP: lungs clear to auscultation - no rales, rhonchi or wheezes  CV: regular rate and rhythm, normal S1 S2, no S3 or S4, no murmur, click or rub, no peripheral edema   NEURO: Normal strength and tone, mentation intact and speech normal  PSYCH: mentation appears normal, anxious, and judgement and insight intact            Signed Electronically by: Alisha Santoro CNP

## 2024-06-10 NOTE — PROGRESS NOTES
"  Assessment & Plan     1. Major depressive disorder, recurrent episode, moderate (H)  Restart zoloft    2. Anxiety  Restart buspirone    I encouraged her to reconnect with her mental health team.      Vascular exam revealed nothing abnormal.  Reassurance provided.        BMI  Estimated body mass index is 25.34 kg/m  as calculated from the following:    Height as of this encounter: 1.651 m (5' 5\").    Weight as of this encounter: 69.1 kg (152 lb 4.8 oz).         She is due for pap, physical and chlamydia screen, will schedule on the way out today.       The longitudinal plan of care for the diagnosis(es)/condition(s) as documented were addressed during this visit. Due to the added complexity in care, I will continue to support Meg in the subsequent management and with ongoing continuity of care.    Tiara Hidalgo,   Certified Adult Nurse Practitioner  [phone]      Darnell Weaver is a 24 year old, presenting for the following health issues:  Musculoskeletal Problem        6/13/2024     8:55 AM   Additional Questions   Roomed by howard   Accompanied by none     HPI     Concern - vein in left arm   Onset: comes and goes   Description: bulges at times   Intensity: mild  Progression of Symptoms:  intermittent  Accompanying Signs & Symptoms: none   Previous history of similar problem: no   Precipitating factors:        Worsened by: lifting and bending arm   Alleviating factors:        Improved by: nothing   Therapies tried and outcome: None      She has not been taking zoloft and buspirone, anxiety is worsening.  She has missed several appointments with her mental health team.  She did request and receive refills of her medications, just has not started them.       Recent Labs   Lab Test 11/29/23  1355 08/13/23  0325 01/19/23  1353 05/11/21  0000 03/24/21  0000 01/29/21  0000 01/19/21  1428 08/21/20  0000 06/03/20  1047 05/08/18  1027 06/22/17  2357   A1C  --   --   --   --   --   --  5.1  --   --   --   " "--    ALT  --  25  --  15  --  15  --    < > 24   < >  --    CR 0.66 0.48*  --  0.81   < > 0.77  --    < > 0.68   < > 0.41*   GFRESTIMATED >90 >90  --  >60   < > >60  --    < > >90   < > >90  Non  GFR Calc     GFRESTBLACK  --   --   --   --   --   --   --   --  >90  --  >90  African American GFR Calc     POTASSIUM 4.0 3.5  --  3.6   < > 3.0*  --    < > 3.6   < > 3.8   TSH  --   --  0.88  --   --  4.17* 1.46  --   --    < >  --     < > = values in this interval not displayed.      BP Readings from Last 3 Encounters:   06/13/24 112/68   05/24/24 124/81   05/20/24 104/56    Wt Readings from Last 3 Encounters:   06/13/24 69.1 kg (152 lb 4.8 oz)   05/24/24 67.1 kg (148 lb)   05/20/24 67.4 kg (148 lb 8 oz)                        Review of Systems  Constitutional, neuro, ENT, endocrine, pulmonary, cardiac, gastrointestinal, genitourinary, musculoskeletal, integument and psychiatric systems are negative, except as otherwise noted.      Objective    /68 (BP Location: Left arm, Patient Position: Chair, Cuff Size: Adult Regular)   Pulse 73   Temp 98.4  F (36.9  C) (Tympanic)   Resp 16   Ht 1.651 m (5' 5\")   Wt 69.1 kg (152 lb 4.8 oz)   LMP 06/13/2024 (Exact Date)   SpO2 100%   BMI 25.34 kg/m    Body mass index is 25.34 kg/m .  Physical Exam   GENERAL: alert and no distress  RESP: lungs clear to auscultation - no rales, rhonchi or wheezes  CV: regular rate and rhythm, normal S1 S2, no S3 or S4, no murmur.  Left antecubital fossa is of normal temp, color, without signs of infection of phlebitis.    MS: no gross musculoskeletal defects noted, no edema  PSYCH: mentation appears normal, affect normal/bright            Signed Electronically by: Tiaramarcello Hidalgo NP"

## 2024-06-13 ENCOUNTER — OFFICE VISIT (OUTPATIENT)
Dept: FAMILY MEDICINE | Facility: OTHER | Age: 24
End: 2024-06-13
Attending: NURSE PRACTITIONER
Payer: COMMERCIAL

## 2024-06-13 VITALS
DIASTOLIC BLOOD PRESSURE: 68 MMHG | BODY MASS INDEX: 25.37 KG/M2 | OXYGEN SATURATION: 100 % | HEART RATE: 73 BPM | RESPIRATION RATE: 16 BRPM | TEMPERATURE: 98.4 F | HEIGHT: 65 IN | SYSTOLIC BLOOD PRESSURE: 112 MMHG | WEIGHT: 152.3 LBS

## 2024-06-13 DIAGNOSIS — F41.9 ANXIETY: ICD-10-CM

## 2024-06-13 DIAGNOSIS — F33.1 MAJOR DEPRESSIVE DISORDER, RECURRENT EPISODE, MODERATE (H): Primary | ICD-10-CM

## 2024-06-13 PROCEDURE — G2211 COMPLEX E/M VISIT ADD ON: HCPCS | Performed by: NURSE PRACTITIONER

## 2024-06-13 PROCEDURE — 99214 OFFICE O/P EST MOD 30 MIN: CPT | Performed by: NURSE PRACTITIONER

## 2024-06-13 PROCEDURE — G0463 HOSPITAL OUTPT CLINIC VISIT: HCPCS

## 2024-06-13 ASSESSMENT — PATIENT HEALTH QUESTIONNAIRE - PHQ9
SUM OF ALL RESPONSES TO PHQ QUESTIONS 1-9: 21
SUM OF ALL RESPONSES TO PHQ QUESTIONS 1-9: 21
10. IF YOU CHECKED OFF ANY PROBLEMS, HOW DIFFICULT HAVE THESE PROBLEMS MADE IT FOR YOU TO DO YOUR WORK, TAKE CARE OF THINGS AT HOME, OR GET ALONG WITH OTHER PEOPLE: EXTREMELY DIFFICULT

## 2024-06-13 ASSESSMENT — PAIN SCALES - GENERAL: PAINLEVEL: MODERATE PAIN (4)

## 2024-06-14 ENCOUNTER — PATIENT OUTREACH (OUTPATIENT)
Dept: CARE COORDINATION | Facility: OTHER | Age: 24
End: 2024-06-14

## 2024-06-14 NOTE — PROGRESS NOTES
Clinic Care Coordination Contact  Care Team Conversations    Patient in to see Bulmaro iHdalgo NP yesterday.  Stopped in to introduce self in person to patient and ask if there are any transportation/care coordination needs at this time.  Patient declined.  Encouraged patient to call to this RN CC if needing assistance with appointments or transportation as needed in the past.    Patient lives walking distance to the clinic and schedules most appointments here at [facility] when possible.    Visit note reviewed:  Patient advised to reconnect with her mental health team, restart medication for depression, and follow up with PCP for PAP, physical exam, and chlamydia screening.      Appointment was scheduled.  Future Appointments 6/14/2024 - 12/11/2024        Date Visit Type Length Department Provider     6/21/2024  2:00 PM PREVENTATIVE ADULT 30 min [facility] Tiara Hidalgo NP    Location Instructions:     From [place] - follow [address]nbsp; Take a right at the intersection across from [employer].&nbsp; The clinic will be on your right.  From [place] - follow [address]nbsp; Take a right onto [address].&nbsp; Take a left at the intersection across from [employer].&nbsp; The clinic will be on your right.  From [place] - follow [address]nbsp; Take a left onto [address].&nbsp; Take a left at the intersection across from [employer].&nbsp; The clinic will be on your right.              7/5/2024  2:30 PM PROCEDURE 30 min [place] Orin Pina NP    Location Instructions:     From [place] - follow [address]nbsp; Take a right at the intersection across from [employer].&nbsp; The clinic will be on your right.  From [place] - follow [address].&nbsp; Take a right onto [address].&nbsp; Take a left at the intersection across from [employer].&nbsp; The clinic will be on your right.  From [place] - follow [address]nbsp; Take a left onto [address]  [address].&nbsp; Take a left at the intersection across from [employer].&nbsp; The clinic will be on your right.                   Will continue to follow patient to ensure she is following up with mental health as directed and offer assistance with referrals and transportation as needed.

## 2024-06-21 ENCOUNTER — OFFICE VISIT (OUTPATIENT)
Dept: FAMILY MEDICINE | Facility: OTHER | Age: 24
End: 2024-06-21
Attending: NURSE PRACTITIONER
Payer: COMMERCIAL

## 2024-06-21 VITALS
SYSTOLIC BLOOD PRESSURE: 110 MMHG | HEART RATE: 71 BPM | WEIGHT: 150.1 LBS | TEMPERATURE: 98.1 F | HEIGHT: 65 IN | RESPIRATION RATE: 16 BRPM | BODY MASS INDEX: 25.01 KG/M2 | OXYGEN SATURATION: 99 % | DIASTOLIC BLOOD PRESSURE: 60 MMHG

## 2024-06-21 DIAGNOSIS — F33.1 MAJOR DEPRESSIVE DISORDER, RECURRENT EPISODE, MODERATE (H): ICD-10-CM

## 2024-06-21 DIAGNOSIS — Z30.013 ENCOUNTER FOR INITIAL PRESCRIPTION OF INJECTABLE CONTRACEPTIVE: ICD-10-CM

## 2024-06-21 DIAGNOSIS — R30.0 DYSURIA: ICD-10-CM

## 2024-06-21 DIAGNOSIS — J45.20 MILD INTERMITTENT ASTHMA WITHOUT COMPLICATION: ICD-10-CM

## 2024-06-21 DIAGNOSIS — Z12.4 CERVICAL CANCER SCREENING: ICD-10-CM

## 2024-06-21 DIAGNOSIS — F31.4 BIPOLAR DISORDER, CURRENT EPISODE DEPRESSED, SEVERE, WITHOUT PSYCHOTIC FEATURES (H): ICD-10-CM

## 2024-06-21 DIAGNOSIS — Z00.00 ANNUAL PHYSICAL EXAM: Primary | ICD-10-CM

## 2024-06-21 LAB
ALBUMIN UR-MCNC: NEGATIVE MG/DL
APPEARANCE UR: CLEAR
BACTERIA #/AREA URNS HPF: ABNORMAL /HPF
BILIRUB UR QL STRIP: NEGATIVE
C TRACH DNA SPEC QL PROBE+SIG AMP: NEGATIVE
COLOR UR AUTO: YELLOW
GLUCOSE UR STRIP-MCNC: NEGATIVE MG/DL
HCG UR QL: NEGATIVE
HGB UR QL STRIP: NEGATIVE
KETONES UR STRIP-MCNC: NEGATIVE MG/DL
LEUKOCYTE ESTERASE UR QL STRIP: ABNORMAL
N GONORRHOEA DNA SPEC QL NAA+PROBE: NEGATIVE
NITRATE UR QL: NEGATIVE
PH UR STRIP: 6.5 [PH] (ref 5–7)
RBC #/AREA URNS AUTO: ABNORMAL /HPF
SP GR UR STRIP: 1.01 (ref 1–1.03)
SQUAMOUS #/AREA URNS AUTO: ABNORMAL /LPF
UROBILINOGEN UR STRIP-ACNC: 0.2 E.U./DL
WBC #/AREA URNS AUTO: ABNORMAL /HPF

## 2024-06-21 PROCEDURE — 99213 OFFICE O/P EST LOW 20 MIN: CPT | Mod: 25 | Performed by: NURSE PRACTITIONER

## 2024-06-21 PROCEDURE — 87491 CHLMYD TRACH DNA AMP PROBE: CPT | Mod: ZL | Performed by: NURSE PRACTITIONER

## 2024-06-21 PROCEDURE — 99395 PREV VISIT EST AGE 18-39: CPT | Performed by: NURSE PRACTITIONER

## 2024-06-21 PROCEDURE — 81001 URINALYSIS AUTO W/SCOPE: CPT | Mod: ZL | Performed by: NURSE PRACTITIONER

## 2024-06-21 PROCEDURE — G0463 HOSPITAL OUTPT CLINIC VISIT: HCPCS

## 2024-06-21 PROCEDURE — 96372 THER/PROPH/DIAG INJ SC/IM: CPT | Performed by: NURSE PRACTITIONER

## 2024-06-21 PROCEDURE — 81025 URINE PREGNANCY TEST: CPT | Mod: ZL | Performed by: NURSE PRACTITIONER

## 2024-06-21 PROCEDURE — 81003 URINALYSIS AUTO W/O SCOPE: CPT | Mod: ZL | Performed by: NURSE PRACTITIONER

## 2024-06-21 PROCEDURE — 250N000011 HC RX IP 250 OP 636: Mod: JZ | Performed by: NURSE PRACTITIONER

## 2024-06-21 PROCEDURE — G0463 HOSPITAL OUTPT CLINIC VISIT: HCPCS | Mod: 25

## 2024-06-21 RX ORDER — MEDROXYPROGESTERONE ACETATE 150 MG/ML
150 INJECTION, SUSPENSION INTRAMUSCULAR
Status: DISPENSED | OUTPATIENT
Start: 2024-06-21 | End: 2025-06-16

## 2024-06-21 RX ORDER — ALBUTEROL SULFATE 90 UG/1
2 AEROSOL, METERED RESPIRATORY (INHALATION) EVERY 6 HOURS PRN
Qty: 18 G | Refills: 3 | Status: SHIPPED | OUTPATIENT
Start: 2024-06-21 | End: 2024-07-11

## 2024-06-21 RX ADMIN — MEDROXYPROGESTERONE ACETATE 150 MG: 150 INJECTION, SUSPENSION INTRAMUSCULAR at 15:05

## 2024-06-21 SDOH — HEALTH STABILITY: PHYSICAL HEALTH: ON AVERAGE, HOW MANY DAYS PER WEEK DO YOU ENGAGE IN MODERATE TO STRENUOUS EXERCISE (LIKE A BRISK WALK)?: 5 DAYS

## 2024-06-21 ASSESSMENT — ASTHMA QUESTIONNAIRES
ACT_TOTALSCORE: 14
QUESTION_1 LAST FOUR WEEKS HOW MUCH OF THE TIME DID YOUR ASTHMA KEEP YOU FROM GETTING AS MUCH DONE AT WORK, SCHOOL OR AT HOME: A LITTLE OF THE TIME
QUESTION_5 LAST FOUR WEEKS HOW WOULD YOU RATE YOUR ASTHMA CONTROL: SOMEWHAT CONTROLLED
QUESTION_4 LAST FOUR WEEKS HOW OFTEN HAVE YOU USED YOUR RESCUE INHALER OR NEBULIZER MEDICATION (SUCH AS ALBUTEROL): THREE OR MORE TIMES PER DAY
ACT_TOTALSCORE: 14
QUESTION_3 LAST FOUR WEEKS HOW OFTEN DID YOUR ASTHMA SYMPTOMS (WHEEZING, COUGHING, SHORTNESS OF BREATH, CHEST TIGHTNESS OR PAIN) WAKE YOU UP AT NIGHT OR EARLIER THAN USUAL IN THE MORNING: TWO OR THREE NIGHTS A WEEK
QUESTION_2 LAST FOUR WEEKS HOW OFTEN HAVE YOU HAD SHORTNESS OF BREATH: ONCE OR TWICE A WEEK

## 2024-06-21 ASSESSMENT — SOCIAL DETERMINANTS OF HEALTH (SDOH): HOW OFTEN DO YOU GET TOGETHER WITH FRIENDS OR RELATIVES?: NEVER

## 2024-06-21 ASSESSMENT — PAIN SCALES - GENERAL: PAINLEVEL: NO PAIN (0)

## 2024-06-21 NOTE — COMMUNITY RESOURCES LIST (ENGLISH)
June 21, 2024           YOUR PERSONALIZED LIST OF SERVICES & PROGRAMS           NAVIGATION    Eligibility Screening      [facility] insurance application assistance  [address] [address] (Distance: 2.4 miles)  Language: English  Fee: Free  Accessibility: Translation services      [facility] application assistance  [address] [address] (Distance: 2.4 miles)  Language: English  Fee: Free      [employer] [place] - SNAP (formerly food stamps) Screening and Application help  Phone: [phone]) [phone]  Website: https://www.[employer].org/programs/[place]-food-helpline/  Language: English  Hours: Mon 10:00 AM - 5:00 PM Tue 10:00 AM - 5:00 PM Wed 10:00 AM - 5:00 PM Thu 10:00 AM - 5:00 PM Fri 10:00 AM - 5:00 PM  Fee: Free  Accessibility: Ada accessible, Blind accommodation, Deaf or hard of hearing, Translation services        ASSISTANCE    Nutrition Benefits      [facility] application assistance  [address] [address] (Distance: 2.4 miles)  Language: English  Fee: Free      [facility] application assistance  [address] [address] (Distance: 2.4 miles)  Language: English  Fee: Free  Accessibility: Translation services      [employer] [place] - SNAP (formerly food stamps) Screening and Application help  Phone: [phone] [phone]  Website: https://www.[employer].org/programs/[place]-food-helpline/  Language: English  Hours: Mon 10:00 AM - 5:00 PM Tue 10:00 AM - 5:00 PM Wed 10:00 AM - 5:00 PM Thu 10:00 AM - 5:00 PM Fri 10:00 AM - 5:00 PM  Fee: Free  Accessibility: Ada accessible, Blind accommodation, Deaf or hard of hearing, Translation services    Pantry      [employer] [place] - Food pantry [facility]  [address] [place] [address] (Distance: 17.9 miles)  Language: English  Fee: Free, Self pay      [employer] - [facility] Free [facility]  [address] [place] [address] (Distance: 2.4 miles)  Language: English  Fee: Free      [employer] - [employer]  Phone: [phone]) [phone]  Website: www.jacintoHealthy LabstimboTripAdvisor.org  Language: English, Japanese  Hours: Mon 9:00 AM - 3:30 PM Tue 9:00 AM - 6:30 PM Wed 9:00 AM - 3:30 PM Thu 9:00 AM - 12:30 PM Fri 9:00 AM - 12:30 PM Sat 9:00 AM - 12:00 PM  Fee: Free        & SHELTER    Housing      [facility] Shelter for families  [address] [place] [address] (Distance: 2.5 miles)  Phone: [phone]  Language: English  Fee: Free      [facility] Shelter for individuals  [address] [place] [address] (Distance: 2.5 miles)  Phone: [phone]  Language: English  Fee: Free      [facility] - [employer] [facility]  Phone: [phone]  Website: https://www.NeighborhoodsDNART LIMITADA/  Language: English, Hmong, Oromo, Belizean, Japanese  Hours: Mon 9:00 AM - 5:00 PM Tue 9:00 AM - 5:00 PM Wed 9:00 AM - 5:00 PM Thu 9:00 AM - 5:00 PM Fri 9:00 AM - 5:00 PM  Fee: Insurance  Accessibility: Blind accommodation, Deaf or hard of hearing, Translation services  Transportation Options: Free transportation    Case Management      [facility] - Housing search assistance  [address] [place] [address] (Distance: 2.4 miles)  Language: English  Fee: Free      [employer] - Housing Stabilization Services  Phone: [phone]) [phone]  Website: https://homebasemn.com/  Language: English  Hours: Mon 8:00 AM - 4:00 PM Tue 8:00 AM - 4:00 PM Wed 8:00 AM - 4:00 PM Thu 8:00 AM - 4:00 PM Fri 8:00 AM - 4:00 PM  Fee: Free  Accessibility: Blind accommodation, Deaf or hard of hearing  Transportation Options: Free transportation    Drop-In Services      [facility] POSTAL SERVICE - MAIL SERVICE FOR THE HOMELESS  Phone: [phone]) [phone]  Website: https://www.WebEventss.[employer]ass            Medical Transportation, (NEMT)      Critical access hospital - Transportation to [facility]  appointments  [address] [place] [address] (Distance: 24.4 miles)  Phone: [phone]  Language: English  Fee: Sliding scale, Self pay, Insurance      [employer] [place] - Neighbor to Neighbor Program  Phone: (818) 865-6277  Email: [email or web address]  Website: https://www.[facility].org/services/older-adults/-services/neighbor-to-neighbor  Language: English  Hours: Mon 8:00 AM - 5:00 PM Tue 8:00 AM - 5:00 PM Wed 8:00 AM - 5:00 PM Thu 8:00 AM - 5:00 PM Fri 8:00 AM - 5:00 PM  Fee: Insurance, Self pay  Accessibility: Deaf or hard of hearing, Blind accommodation, Translation services    Expense Assistance      - Dislocated Worker/Adult [employer] Employment Program  Phone: (163) 133-3277  Email: zina@[employer]  Website: https://[employer]/services/employment-services/dislocated-worker-program/  Language: English, Sudanese  Hours: Mon 8:00 AM - 4:30 PM Tue 8:00 AM - 4:30 PM Wed 8:00 AM - 4:30 PM Thu 8:00 AM - 4:30 PM Fri 8:00 AM - 4:30 PM  Fee: Free  Accessibility: Ada accessible    Coordination      [employer] - [employer] - Free or Low-cost Transportation  [address] [place] [address] (Distance: 17.9 miles)  Language: English  Fee: Free      [employer] - [employer]  [address] [place] [address] (Distance: 2.4 miles)  Language: English  Fee: Self pay      [employer] Transportation - How [employer] works  Phone: (496) 861-3945  Website: https://www.Promoter.io/members/shop-plans/[place]-health-care-programs/blueride-transportation  Language: English  Hours: Mon 8:00 AM - 5:00 PM Tue 8:00 AM - 5:00 PM Wed 8:00 AM - 5:00 PM Thu 8:00 AM - 5:00 PM Fri 8:00 AM - 5:00 PM               IMPORTANT NUMBERS & WEBSITES        Emergency Services  911  .   [facility]  211 http://211unitedway.org  .   Poison Control  (454) 734-3629 http://[place]poison.org http://wisconsinpoison.org  .     Suicide and Crisis Lifeline  988 http://988[facility].org  .    Childhelp [place] Child Abuse Hotline  209.716.1978 http://Childhelphotline.org   .   National Sexual Assault Hotline  (562) 818-1491 (HOPE) http://Rainn.org   .     National Runaway Safeline  (603) 504-5052 (RUNAWAY) http://Fleep.[employer]ix  .   Pregnancy & Postpartum Support  Call/text 258-607-7392  [place]: http://ppsupportmn.org  [place]: http://psichapters.com/wi  .   Substance Abuse National Helpline ([facility])  254-433-HELP (5856) http://Findtreatment.gov   .                DISCLAIMER: These resources have been generated via the Fetchnotes Platform. Fetch[employer] does not endorse any service providers mentioned in this resource list. F[employer] does not guarantee that the services mentioned in this resource list will be available to you or will improve your health or wellness.    [facility]

## 2024-06-21 NOTE — PATIENT INSTRUCTIONS
Assessment & Plan     1. Annual physical exam  Exam completed   - GC/Chlamydia by PCR - HI,[facility]    2. Major depressive disorder, recurrent episode, moderate (H)  Restart mediations.     3. Bipolar disorder, current episode depressed, severe, without psychotic features (H)  As above    4. Mild intermittent asthma without complication  Start dulera, continue albuterol as needed   - mometasone-formoterol (DULERA) 100-5 MCG/ACT inhaler; Inhale 2 puffs into the lungs 2 times daily  Dispense: 13 g; Refill: 3  - albuterol (PROAIR HFA/PROVENTIL HFA/VENTOLIN HFA) 108 (90 Base) MCG/ACT inhaler; Inhale 2 puffs into the lungs every 6 hours as needed for shortness of breath or wheezing  Dispense: 18 g; Refill: 3    5. Cervical cancer screening  Due January 2026    6. Encounter for initial prescription of injectable contraceptive  - HCG Qual, Urine ([id number]) - negative, will start depo today  - medroxyPROGESTERone (DEPO-PROVERA) injection 150 mg    7. Dysuria  - UA Macroscopic with reflex to Microscopic and Culture; Future      Follow-up as needed    Tiara Hidalgo,   Certified Adult Nurse Practitioner  [phone]

## 2024-06-21 NOTE — PROGRESS NOTES
Preventive Care Visit  [facility] [place] [facility]  Tiara Hidalgo, HANDY, Family Medicine  Jun 21, 2024      Assessment & Plan     1. Annual physical exam  Exam completed   - GC/Chlamydia by PCR - HI,GH    2. Major depressive disorder, recurrent episode, moderate (H)  Restart mediations.     3. Bipolar disorder, current episode depressed, severe, without psychotic features (H)  As above    4. Mild intermittent asthma without complication  Start dulera, continue albuterol as needed   - mometasone-formoterol (DULERA) 100-5 MCG/ACT inhaler; Inhale 2 puffs into the lungs 2 times daily  Dispense: 13 g; Refill: 3  - albuterol (PROAIR HFA/PROVENTIL HFA/VENTOLIN HFA) 108 (90 Base) MCG/ACT inhaler; Inhale 2 puffs into the lungs every 6 hours as needed for shortness of breath or wheezing  Dispense: 18 g; Refill: 3    5. Cervical cancer screening  Due January 2026    6. Encounter for initial prescription of injectable contraceptive  - HCG Qual, Urine ([id number]) - negative, will start depo today  - medroxyPROGESTERone (DEPO-PROVERA) injection 150 mg    7. Dysuria  - UA Macroscopic with reflex to Microscopic and Culture; Future          Patient has been advised of split billing requirements and indicates understanding: Yes        Counseling  Appropriate preventive services were discussed with this patient, including applicable screening as appropriate for fall prevention, nutrition, physical activity, Tobacco-use cessation, weight loss and cognition.  Checklist reviewing preventive services available has been given to the patient.  Reviewed patient's diet, addressing concerns and/or questions.   Patient is at risk for social isolation and has been provided with information about the benefit of social connection.   The patient was instructed to see the dentist every 6 months.       Follow-up in 3 months for depo injection.     Tiara Hidalgo,   Certified Adult Nurse Practitioner  [phone]          Darnell Weaver is a 24  year old, presenting for the following:  Physical        6/21/2024     1:50 PM   Additional Questions   Roomed by howard   Accompanied by none        Health Care Directive  Patient does not have a Health Care Directive or Living Will: Discussed advance care planning with patient; however, patient declined at this time.    HPI    Depression and Anxiety   How are you doing with your depression since your last visit? Worsened has not been taking medications  How are you doing with your anxiety since your last visit?  Worse - not taking medications  Are you having other symptoms that might be associated with depression or anxiety? No  Have you had a significant life event? No   Do you have any concerns with your use of alcohol or other drugs? No    Social History     Tobacco Use    Smoking status: Never    Smokeless tobacco: Never   Vaping Use    Vaping status: Never Used   Substance Use Topics    Alcohol use: No    Drug use: No         2/16/2024     9:27 AM 5/20/2024     9:28 AM 6/13/2024     8:55 AM   PHQ   PHQ-9 Total Score 20 20 21   Q9: Thoughts of better off dead/self-harm past 2 weeks More than half the days Not at all Not at all         9/21/2023     1:34 PM 10/16/2023    10:53 AM 11/29/2023     1:09 PM   RUFINA-7 SCORE   Total Score  15 (severe anxiety) 21 (severe anxiety)   Total Score 21 15 21         Suicide Assessment Five-step Evaluation and Treatment (SAFE-T)    Asthma Follow-Up    Was ACT completed today?  Yes        6/21/2024     1:51 PM   ACT Total Scores   ACT TOTAL SCORE (Goal Greater than or Equal to 20) 14   In the past 12 months, how many times did you visit the emergency room for your asthma without being admitted to the hospital? 0   In the past 12 months, how many times were you hospitalized overnight because of your asthma? 0     How many days per week do you miss taking your asthma controller medication?  I do not have an asthma controller medication, has tried advair and symbicort - which did not  help, lungs felt burning.   Please describe any recent triggers for your asthma: None  Have you had any Emergency Room Visits, Urgent Care Visits, or Hospital Admissions since your last office visit?  No        6/21/2024   General Health   How would you rate your overall physical health? (!) FAIR   Feel stress (tense, anxious, or unable to sleep) Very much      (!) STRESS CONCERN      6/21/2024   Nutrition   Three or more servings of calcium each day? Yes   Diet: Other   If other, please elaborate: lowfodmap   How many servings of fruit and vegetables per day? (!) 2-3   How many sweetened beverages each day? 0-1            6/21/2024   Exercise   Days per week of moderate/strenous exercise 5 days            6/21/2024   Social Factors   Frequency of gathering with friends or relatives Never   Worry food won't last until get money to buy more Yes   Food not last or not have enough money for food? Yes   Do you have housing? (Housing is defined as stable permanent housing and does not include staying ouside in a car, in a tent, in an abandoned building, in an overnight shelter, or couch-surfing.) No   Are you worried about losing your housing? No   Lack of transportation? Yes   Unable to get utilities (heat,electricity)? No   Want help with housing or utility concern? No      (!) FOOD SECURITY CONCERN PRESENT (!) TRANSPORTATION CONCERN PRESENT(!) HOUSING CONCERN PRESENT(!) SOCIAL CONNECTIONS CONCERN      6/21/2024   Dental   Dentist two times every year? (!) NO            6/21/2024   TB Screening   Were you born outside of the [place]? No        Today's PHQ-9 Score:       6/13/2024     8:55 AM   PHQ-9 SCORE   PHQ-9 Total Score MyChart 21 (Severe depression)   PHQ-9 Total Score 21           6/21/2024   Substance Use   Alcohol more than 3/day or more than 7/wk Not Applicable   Do you use any other substances recreationally? No        Social History     Tobacco Use    Smoking status: Never    Smokeless tobacco: Never   Vaping  "Use    Vaping status: Never Used   Substance Use Topics    Alcohol use: No    Drug use: No           6/21/2024   STI Screening   New sexual partner(s) since last STI/HIV test? No        History of abnormal Pap smear: No - age 21-29 PAP every 3 years recommended        1/19/2023     2:11 PM 1/19/2021     2:22 PM   PAP / HPV   PAP Negative for Intraepithelial Lesion or Malignancy (NILM)     PAP (Historical)  NIL            6/21/2024   Contraception/Family Planning   Questions about contraception or family planning (!) YES     Would like to start depo - not very good at taking medications daily.         Reviewed and updated as needed this visit by Provider                    BP Readings from Last 3 Encounters:   06/21/24 110/60   06/13/24 112/68   05/24/24 124/81    Wt Readings from Last 3 Encounters:   06/21/24 68.1 kg (150 lb 1.6 oz)   06/13/24 69.1 kg (152 lb 4.8 oz)   05/24/24 67.1 kg (148 lb)                      Review of Systems  CONSTITUTIONAL: NEGATIVE for fever, chills, change in weight  INTEGUMENTARY/SKIN: NEGATIVE for worrisome rashes, moles or lesions  EYES: NEGATIVE for vision changes or irritation  ENT/MOUTH: NEGATIVE for ear, mouth and throat problems  RESP: NEGATIVE for significant cough or SOB  BREAST: NEGATIVE for masses, tenderness or discharge  CV: NEGATIVE for chest pain, palpitations or peripheral edema  GI: NEGATIVE for nausea, abdominal pain, heartburn, or change in bowel habits  : NEGATIVE for frequency, dysuria, or hematuria  MUSCULOSKELETAL: NEGATIVE for significant arthralgias or myalgia  NEURO: NEGATIVE for weakness, dizziness or paresthesias  ENDOCRINE: NEGATIVE for temperature intolerance, skin/hair changes  HEME: NEGATIVE for bleeding problems  PSYCHIATRIC: depressed mood     Objective    Exam  /60 (BP Location: Left arm, Patient Position: Chair, Cuff Size: Adult Regular)   Pulse 71   Temp 98.1  F (36.7  C) (Tympanic)   Resp 16   Ht 1.651 m (5' 5\")   Wt 68.1 kg (150 lb 1.6 " "oz)   LMP 06/13/2024 (Exact Date)   SpO2 99%   BMI 24.98 kg/m     Estimated body mass index is 24.98 kg/m  as calculated from the following:    Height as of this encounter: 1.651 m (5' 5\").    Weight as of this encounter: 68.1 kg (150 lb 1.6 oz).    Physical Exam  GENERAL: alert and no distress  HENT: ear canals and TM's normal, nose and mouth without ulcers or lesions  NECK: no adenopathy, no asymmetry, masses, or scars  RESP: lungs clear to auscultation - no rales, rhonchi or wheezes  CV: regular rate and rhythm, normal S1 S2, no S3 or S4, no murmur  MS: no gross musculoskeletal defects noted, no edema  PSYCH: mentation appears normal, affect normal/bright    Results for orders placed or performed in visit on 06/21/24   HCG Qual, Urine ([id number])     Status: Normal   Result Value Ref Range    hCG Urine Qualitative Negative Negative   UA Macroscopic with reflex to Microscopic and Culture     Status: Abnormal    Specimen: Urine, NOS   Result Value Ref Range    Color Urine Yellow Colorless, Straw, Light Yellow, Yellow    Appearance Urine Clear Clear    Glucose Urine Negative Negative mg/dL    Bilirubin Urine Negative Negative    Ketones Urine Negative Negative mg/dL    Specific Gravity Urine 1.010 1.003 - 1.035    Blood Urine Negative Negative    pH Urine 6.5 5.0 - 7.0    Protein Albumin Urine Negative Negative mg/dL    Urobilinogen Urine 0.2 0.2, 1.0 E.U./dL    Nitrite Urine Negative Negative    Leukocyte Esterase Urine Trace (A) Negative   UA Microscopic with Reflex to Culture     Status: Abnormal   Result Value Ref Range    Bacteria Urine Moderate (A) None Seen /HPF    RBC Urine None Seen 0-2 /HPF /HPF    WBC Urine 0-5 0-5 /HPF /HPF    Squamous Epithelials Urine Moderate (A) None Seen /LPF    Narrative    Urine Culture not indicated         Signed Electronically by: Tiara Hidalgo NP    "

## 2024-06-21 NOTE — NURSING NOTE

## 2024-07-05 ENCOUNTER — OFFICE VISIT (OUTPATIENT)
Dept: OTOLARYNGOLOGY | Facility: OTHER | Age: 24
End: 2024-07-05
Attending: NURSE PRACTITIONER
Payer: COMMERCIAL

## 2024-07-05 VITALS
HEART RATE: 77 BPM | HEIGHT: 63 IN | WEIGHT: 148.7 LBS | SYSTOLIC BLOOD PRESSURE: 106 MMHG | TEMPERATURE: 98.4 F | RESPIRATION RATE: 18 BRPM | OXYGEN SATURATION: 99 % | DIASTOLIC BLOOD PRESSURE: 60 MMHG | BODY MASS INDEX: 26.35 KG/M2

## 2024-07-05 DIAGNOSIS — M26.622 TMJ TENDERNESS, LEFT: Primary | ICD-10-CM

## 2024-07-05 DIAGNOSIS — G43.E09 CHRONIC MIGRAINE WITH AURA WITHOUT STATUS MIGRAINOSUS, NOT INTRACTABLE: ICD-10-CM

## 2024-07-05 DIAGNOSIS — G44.209 TENSION HEADACHE: ICD-10-CM

## 2024-07-05 DIAGNOSIS — H61.23 EXCESSIVE CERUMEN IN BOTH EAR CANALS: ICD-10-CM

## 2024-07-05 PROCEDURE — 92504 EAR MICROSCOPY EXAMINATION: CPT | Performed by: NURSE PRACTITIONER

## 2024-07-05 PROCEDURE — G0463 HOSPITAL OUTPT CLINIC VISIT: HCPCS

## 2024-07-05 PROCEDURE — 99213 OFFICE O/P EST LOW 20 MIN: CPT | Mod: 25 | Performed by: NURSE PRACTITIONER

## 2024-07-05 RX ORDER — NORTRIPTYLINE HCL 25 MG
25 CAPSULE ORAL AT BEDTIME
Qty: 90 CAPSULE | Refills: 1 | Status: SHIPPED | OUTPATIENT
Start: 2024-07-05

## 2024-07-05 RX ORDER — MOMETASONE FUROATE 1 MG/G
CREAM TOPICAL DAILY
Qty: 15 G | Refills: 2 | Status: SHIPPED | OUTPATIENT
Start: 2024-07-05 | End: 2024-07-19

## 2024-07-05 RX ORDER — HYDROCORTISONE AND ACETIC ACID 20.75; 10.375 MG/ML; MG/ML
5 SOLUTION AURICULAR (OTIC) 2 TIMES DAILY
Qty: 10 ML | Refills: 2 | Status: SHIPPED | OUTPATIENT
Start: 2024-07-05 | End: 2024-07-19

## 2024-07-05 ASSESSMENT — PAIN SCALES - GENERAL: PAINLEVEL: NO PAIN (0)

## 2024-07-05 NOTE — PATIENT INSTRUCTIONS
Thank you for allowing Orin Dumont and our ENT team to participate in your care.  If your medications are too expensive, please give the nurse a call.  We can possibly change this medication.  If you have a scheduling or an appointment question please contact our Health Unit Coordinator at their direct line [phone].   ALL nursing questions or concerns can be directed to your ENT nurse at: [phone] - Francisca     Start mometasone and vosol HC drops for ear itching  Start nortriptyline for migraine prevention    1 of 4  Massage the masseter muscles:  Your masseter muscles are used to chew and clench. They are located under your cheek bones. The masseter muscles can become painful and thick if you have TMD. Gentle massage can help.      Find the Rest Position with your jaw.   Place 2 to 3 fingers on the muscles below your cheek bones. If you clench your teeth, you should feel the muscles tighten under those fingers. Press into the muscles and hold that pressure for 6 to 10 seconds.  Keep your jaw relaxed and repeat in another tender or tight area of your cheek.   Find at least 4 to 5 different parts of the muscle to work on. Try to find at least 4 to 5 different parts of the muscle to work on.         2 of 4  Massage the temporalis muscles:  The temporalis muscles are located along the sides of your head under your scalp and are used to pull your teeth together. They can become thick and tight, giving you a headache or head tension.      Place 2 to 3 fingers on top of the painful and tense parts of this muscle. Make sure you are NOT pressing on your temples.  Find the Rest Position with your jaw.  Press firmly on the muscle and hold for 6 to 10 seconds.   Move your fingers to another spot along the side of your head that is tight or painful.  Find 4 to 5 different spots to work on.          3 of 4  Upper cervical muscles 1:  Find the Rest Position with your jaw.   Support your forehead gently with  one hand.   Use the fingertips of your other hand to gently massage the tender muscles at the top of your neck under your skull.   Move your hand back and forth without allowing your fingers to slide on the skin or hair.   Massage for 1 minute.          4 of 4  Upper cervical muscles 2:  Find the Rest Position with your jaw.   Support your forehead gently with one hand.   Use the fingertips of your other hand to gently massage the tender muscles at the top of your neck under your skull.   Move your hand back and forth without allowing your fingers to slide on the skin or hair.   Massage for 1 minute.

## 2024-07-09 ENCOUNTER — TELEPHONE (OUTPATIENT)
Dept: FAMILY MEDICINE | Facility: OTHER | Age: 24
End: 2024-07-09

## 2024-07-09 NOTE — TELEPHONE ENCOUNTER
8:13 AM    Reason for Call: OVERBOOK    Patient is having the following symptoms: RIB PAIN - LEFT SIDE - LUMP UNDER THE SKIN - FEELS HARD UNDER RIB - FOR AN UNKNOWN AMOUNT OF TIME    The patient is requesting an appointment for ASAP with [facility].    Was an appointment offered for this call? No  If yes : Appointment type              Date    Preferred method for responding to this message: Telephone Call  What is your phone number ? [phone]     If we cannot reach you directly, may we leave a detailed response at the number you provided? Yes    Can this message wait until your PCP/provider returns, if unavailable today?ALEXIA Santos

## 2024-07-11 ENCOUNTER — ANCILLARY PROCEDURE (OUTPATIENT)
Dept: GENERAL RADIOLOGY | Facility: OTHER | Age: 24
End: 2024-07-11
Attending: NURSE PRACTITIONER
Payer: COMMERCIAL

## 2024-07-11 ENCOUNTER — OFFICE VISIT (OUTPATIENT)
Dept: FAMILY MEDICINE | Facility: OTHER | Age: 24
End: 2024-07-11
Attending: NURSE PRACTITIONER
Payer: COMMERCIAL

## 2024-07-11 ENCOUNTER — TELEPHONE (OUTPATIENT)
Dept: FAMILY MEDICINE | Facility: OTHER | Age: 24
End: 2024-07-11

## 2024-07-11 VITALS
RESPIRATION RATE: 16 BRPM | WEIGHT: 148.8 LBS | DIASTOLIC BLOOD PRESSURE: 68 MMHG | BODY MASS INDEX: 26.36 KG/M2 | OXYGEN SATURATION: 98 % | SYSTOLIC BLOOD PRESSURE: 108 MMHG | TEMPERATURE: 98.5 F | HEIGHT: 63 IN | HEART RATE: 93 BPM

## 2024-07-11 DIAGNOSIS — M94.0 COSTOCHONDRITIS: Primary | ICD-10-CM

## 2024-07-11 DIAGNOSIS — R07.81 RIB PAIN ON LEFT SIDE: ICD-10-CM

## 2024-07-11 DIAGNOSIS — F33.1 MAJOR DEPRESSIVE DISORDER, RECURRENT EPISODE, MODERATE (H): ICD-10-CM

## 2024-07-11 DIAGNOSIS — J45.20 MILD INTERMITTENT ASTHMA WITHOUT COMPLICATION: ICD-10-CM

## 2024-07-11 PROCEDURE — 99214 OFFICE O/P EST MOD 30 MIN: CPT | Performed by: NURSE PRACTITIONER

## 2024-07-11 PROCEDURE — 71101 X-RAY EXAM UNILAT RIBS/CHEST: CPT | Mod: TC,LT

## 2024-07-11 PROCEDURE — G2211 COMPLEX E/M VISIT ADD ON: HCPCS | Performed by: NURSE PRACTITIONER

## 2024-07-11 PROCEDURE — G0463 HOSPITAL OUTPT CLINIC VISIT: HCPCS

## 2024-07-11 RX ORDER — ALBUTEROL SULFATE 90 UG/1
2 AEROSOL, METERED RESPIRATORY (INHALATION) EVERY 6 HOURS PRN
Qty: 18 G | Refills: 3 | Status: SHIPPED | OUTPATIENT
Start: 2024-07-11

## 2024-07-11 RX ORDER — IBUPROFEN 600 MG/1
600 TABLET, FILM COATED ORAL 3 TIMES DAILY PRN
Qty: 90 TABLET | Refills: 1 | Status: SHIPPED | OUTPATIENT
Start: 2024-07-11

## 2024-07-11 RX ORDER — PREDNISONE 20 MG/1
20 TABLET ORAL DAILY
Qty: 5 TABLET | Refills: 0 | Status: SHIPPED | OUTPATIENT
Start: 2024-07-11 | End: 2024-07-16

## 2024-07-11 ASSESSMENT — PAIN SCALES - GENERAL: PAINLEVEL: MILD PAIN (3)

## 2024-07-11 NOTE — PROGRESS NOTES
"  Assessment & Plan       1. Costochondritis  Ice, deep breathing   - ibuprofen (ADVIL/MOTRIN) 600 MG tablet; Take 1 tablet (600 mg) by mouth 3 times daily as needed for moderate pain  Dispense: 90 tablet; Refill: 1  - predniSONE (DELTASONE) 20 MG tablet; Take 1 tablet (20 mg) by mouth daily for 5 days  Dispense: 5 tablet; Refill: 0    2. Mild intermittent asthma without complication  - albuterol (PROAIR HFA/PROVENTIL HFA/VENTOLIN HFA) 108 (90 Base) MCG/ACT inhaler; Inhale 2 puffs into the lungs every 6 hours as needed for shortness of breath or wheezing  Dispense: 18 g; Refill: 3    3. Rib pain on left side  No fracture noted.   - XR Ribs and Chest Left 3 Views    4. Major depressive disorder, recurrent episode, moderate (H)  - Encouraged to restart psych medications as ordered by Mental Health.      BMI  Estimated body mass index is 26.36 kg/m  as calculated from the following:    Height as of this encounter: 1.6 m (5' 3\").    Weight as of this encounter: 67.5 kg (148 lb 12.8 oz).     Follow-up ifno improvement or any worsening.     The longitudinal plan of care for the diagnosis(es)/condition(s) as documented were addressed during this visit. Due to the added complexity in care, I will continue to support Meg in the subsequent management and with ongoing continuity of care.    Tiara Hidalgo,   Certified Adult Nurse Practitioner  [phone]      Darnell Weaver is a 24 year old, presenting for the following health issues:  Rib Pain        7/11/2024     9:34 AM   Additional Questions   Roomed by howard   Accompanied by none     HPI     Pain History:  When did you first notice your pain? 4 days   Have you seen anyone else for your pain? No  How has your pain affected your ability to work? Yes when standing and walking and twisting   Where in your body do you have pain?  Left side pain noted a little lump under rib cage     Requesting refill of albuterol, has been out and noticed increased wheezing.  " "    Recent Labs   Lab Test 11/29/23  1355 08/13/23  0325 01/19/23  1353 05/11/21  0000 03/24/21  0000 01/29/21  0000 01/19/21  1428 08/21/20  0000 06/03/20  1047 05/08/18  1027 06/22/17  2357   A1C  --   --   --   --   --   --  5.1  --   --   --   --    ALT  --  25  --  15  --  15  --    < > 24   < >  --    CR 0.66 0.48*  --  0.81   < > 0.77  --    < > 0.68   < > 0.41*   GFRESTIMATED >90 >90  --  >60   < > >60  --    < > >90   < > >90  Non  GFR Calc     GFRESTBLACK  --   --   --   --   --   --   --   --  >90  --  >90  African American GFR Calc     POTASSIUM 4.0 3.5  --  3.6   < > 3.0*  --    < > 3.6   < > 3.8   TSH  --   --  0.88  --   --  4.17* 1.46  --   --    < >  --     < > = values in this interval not displayed.      BP Readings from Last 3 Encounters:   07/11/24 108/68   07/05/24 106/60   06/21/24 110/60    Wt Readings from Last 3 Encounters:   07/11/24 67.5 kg (148 lb 12.8 oz)   07/05/24 67.4 kg (148 lb 11.2 oz)   06/21/24 68.1 kg (150 lb 1.6 oz)               Review of Systems  Constitutional, neuro, ENT, endocrine, pulmonary, cardiac, gastrointestinal, genitourinary, musculoskeletal, integument and psychiatric systems are negative, except as otherwise noted.      Objective    /68 (BP Location: Right arm, Patient Position: Chair, Cuff Size: Adult Regular)   Pulse 93   Temp 98.5  F (36.9  C) (Tympanic)   Resp 16   Ht 1.6 m (5' 3\")   Wt 67.5 kg (148 lb 12.8 oz)   LMP 06/13/2024 (Exact Date)   SpO2 98%   BMI 26.36 kg/m    Body mass index is 26.36 kg/m .  Physical Exam   GENERAL: alert and no distress  RESP: lungs clear to auscultation - no rales, rhonchi or wheezes  CV: regular rate and rhythm, normal S1 S2, no S3 or S4, no murmur, click or rub, no peripheral edema  MS: left ribs, tenderness of intercostal muscles, no tenderness over ribs.  No abdominal tenderness on palpation of 4 quadrants.  No masses noted.    PSYCH: mentation appears normal, affect normal/bright    Results for " orders placed or performed in visit on 07/11/24   XR Ribs and Chest Left 3 Views     Status: None    Narrative    PROCEDURE: XR RIBS AND CHEST LEFT 3 VIEWS 7/11/2024 10:26 AM    HISTORY: Rib pain on left side    COMPARISONS: None.    TECHNIQUE: Chest one view, left RIBS 2 views    FINDINGS: Chest: The lungs are clear. The heart and the pulmonary  vessels are within normal limits. There is no pneumothorax or pleural  effusion    Left RIBS: No left rib fracture or destructive lesion is noted.         Impression    IMPRESSION: Normal chest and left RIBS    CAROLYNE ZUNIGA MD         SYSTEM ID:  [id number]             Signed Electronically by: Tiara Hidalgo NP

## 2024-07-11 NOTE — PATIENT INSTRUCTIONS
"  Assessment & Plan       1. Costochondritis  Ice, deep breathing   - ibuprofen (ADVIL/MOTRIN) 600 MG tablet; Take 1 tablet (600 mg) by mouth 3 times daily as needed for moderate pain  Dispense: 90 tablet; Refill: 1  - predniSONE (DELTASONE) 20 MG tablet; Take 1 tablet (20 mg) by mouth daily for 5 days  Dispense: 5 tablet; Refill: 0    2. Mild intermittent asthma without complication  - albuterol (PROAIR HFA/PROVENTIL HFA/VENTOLIN HFA) 108 (90 Base) MCG/ACT inhaler; Inhale 2 puffs into the lungs every 6 hours as needed for shortness of breath or wheezing  Dispense: 18 g; Refill: 3    3. Rib pain on left side  No fracture noted.   - XR Ribs and Chest Left 3 Views          BMI  Estimated body mass index is 26.36 kg/m  as calculated from the following:    Height as of this encounter: 1.6 m (5' 3\").    Weight as of this encounter: 67.5 kg (148 lb 12.8 oz).     Follow-up ifno improvement or any worsening.       Tiara Hidalgo,   Certified Adult Nurse Practitioner  [phone]    "

## 2024-07-11 NOTE — TELEPHONE ENCOUNTER
Received PA approval from [facility] for albuterol (PROAIR HFA/PROVENTIL HFA/VENTOLIN HFA) 108 (90 Base) MCG/ACT inhaler. Scanned into EPIC. Must be filled after 7/12/24 to soon to fill b4 that date. Last fill 6/21/24.

## 2024-07-11 NOTE — TELEPHONE ENCOUNTER
Received PA request from [facility] for albuterol (PROAIR HFA/PROVENTIL HFA/VENTOLIN HFA) 108 (90 Base) MCG/ACT inhaler. Submitted on CMM, waiting for response.

## 2024-08-01 ENCOUNTER — MYC MEDICAL ADVICE (OUTPATIENT)
Dept: FAMILY MEDICINE | Facility: OTHER | Age: 24
End: 2024-08-01

## 2024-08-26 ENCOUNTER — OFFICE VISIT (OUTPATIENT)
Dept: FAMILY MEDICINE | Facility: OTHER | Age: 24
End: 2024-08-26
Attending: NURSE PRACTITIONER
Payer: COMMERCIAL

## 2024-08-26 VITALS
SYSTOLIC BLOOD PRESSURE: 110 MMHG | RESPIRATION RATE: 16 BRPM | DIASTOLIC BLOOD PRESSURE: 62 MMHG | HEIGHT: 63 IN | WEIGHT: 149.8 LBS | OXYGEN SATURATION: 100 % | HEART RATE: 80 BPM | BODY MASS INDEX: 26.54 KG/M2 | TEMPERATURE: 99.2 F

## 2024-08-26 DIAGNOSIS — N30.00 ACUTE CYSTITIS WITHOUT HEMATURIA: Primary | ICD-10-CM

## 2024-08-26 DIAGNOSIS — N89.8 VAGINAL IRRITATION: ICD-10-CM

## 2024-08-26 DIAGNOSIS — N92.1 MENORRHAGIA WITH IRREGULAR CYCLE: ICD-10-CM

## 2024-08-26 DIAGNOSIS — J45.20 MILD INTERMITTENT ASTHMA WITHOUT COMPLICATION: ICD-10-CM

## 2024-08-26 LAB
ALBUMIN UR-MCNC: NEGATIVE MG/DL
APPEARANCE UR: ABNORMAL
BACTERIA #/AREA URNS HPF: ABNORMAL /HPF
BASOPHILS # BLD AUTO: 0 10E3/UL (ref 0–0.2)
BASOPHILS NFR BLD AUTO: 1 %
BILIRUB UR QL STRIP: NEGATIVE
CLUE CELLS: ABNORMAL
COLOR UR AUTO: YELLOW
EOSINOPHIL # BLD AUTO: 0.2 10E3/UL (ref 0–0.7)
EOSINOPHIL NFR BLD AUTO: 3 %
ERYTHROCYTE [DISTWIDTH] IN BLOOD BY AUTOMATED COUNT: 12.8 % (ref 10–15)
GLUCOSE UR STRIP-MCNC: NEGATIVE MG/DL
HCT VFR BLD AUTO: 35.8 % (ref 35–47)
HGB BLD-MCNC: 12.4 G/DL (ref 11.7–15.7)
HGB UR QL STRIP: ABNORMAL
HOLD SPECIMEN: NORMAL
IMM GRANULOCYTES # BLD: 0 10E3/UL
IMM GRANULOCYTES NFR BLD: 0 %
KETONES UR STRIP-MCNC: NEGATIVE MG/DL
LEUKOCYTE ESTERASE UR QL STRIP: ABNORMAL
LYMPHOCYTES # BLD AUTO: 2.3 10E3/UL (ref 0.8–5.3)
LYMPHOCYTES NFR BLD AUTO: 28 %
MCH RBC QN AUTO: 29.7 PG (ref 26.5–33)
MCHC RBC AUTO-ENTMCNC: 34.6 G/DL (ref 31.5–36.5)
MCV RBC AUTO: 86 FL (ref 78–100)
MONOCYTES # BLD AUTO: 0.6 10E3/UL (ref 0–1.3)
MONOCYTES NFR BLD AUTO: 7 %
NEUTROPHILS # BLD AUTO: 4.9 10E3/UL (ref 1.6–8.3)
NEUTROPHILS NFR BLD AUTO: 61 %
NITRATE UR QL: NEGATIVE
PH UR STRIP: 6.5 [PH] (ref 5–7)
PLATELET # BLD AUTO: 262 10E3/UL (ref 150–450)
RBC # BLD AUTO: 4.18 10E6/UL (ref 3.8–5.2)
RBC #/AREA URNS AUTO: ABNORMAL /HPF
SP GR UR STRIP: 1.01 (ref 1–1.03)
SQUAMOUS #/AREA URNS AUTO: ABNORMAL /LPF
TRANS CELLS #/AREA URNS HPF: ABNORMAL /HPF
TRICHOMONAS, WET PREP: ABNORMAL
UROBILINOGEN UR STRIP-ACNC: 0.2 E.U./DL
WBC # BLD AUTO: 8 10E3/UL (ref 4–11)
WBC #/AREA URNS AUTO: ABNORMAL /HPF
WBC'S/HIGH POWER FIELD, WET PREP: ABNORMAL
YEAST, WET PREP: ABNORMAL

## 2024-08-26 PROCEDURE — G0463 HOSPITAL OUTPT CLINIC VISIT: HCPCS

## 2024-08-26 PROCEDURE — 99214 OFFICE O/P EST MOD 30 MIN: CPT | Performed by: NURSE PRACTITIONER

## 2024-08-26 PROCEDURE — 81003 URINALYSIS AUTO W/O SCOPE: CPT | Mod: ZL | Performed by: NURSE PRACTITIONER

## 2024-08-26 PROCEDURE — 87086 URINE CULTURE/COLONY COUNT: CPT | Mod: ZL | Performed by: NURSE PRACTITIONER

## 2024-08-26 PROCEDURE — G2211 COMPLEX E/M VISIT ADD ON: HCPCS | Performed by: NURSE PRACTITIONER

## 2024-08-26 PROCEDURE — 36415 COLL VENOUS BLD VENIPUNCTURE: CPT | Mod: ZL | Performed by: NURSE PRACTITIONER

## 2024-08-26 PROCEDURE — 85025 COMPLETE CBC W/AUTO DIFF WBC: CPT | Mod: ZL | Performed by: NURSE PRACTITIONER

## 2024-08-26 PROCEDURE — 87210 SMEAR WET MOUNT SALINE/INK: CPT | Mod: ZL | Performed by: NURSE PRACTITIONER

## 2024-08-26 PROCEDURE — 81001 URINALYSIS AUTO W/SCOPE: CPT | Mod: ZL | Performed by: NURSE PRACTITIONER

## 2024-08-26 RX ORDER — SULFAMETHOXAZOLE/TRIMETHOPRIM 800-160 MG
1 TABLET ORAL 2 TIMES DAILY
Qty: 14 TABLET | Refills: 0 | Status: SHIPPED | OUTPATIENT
Start: 2024-08-26 | End: 2024-09-02

## 2024-08-26 ASSESSMENT — ANXIETY QUESTIONNAIRES
GAD7 TOTAL SCORE: 21
6. BECOMING EASILY ANNOYED OR IRRITABLE: NEARLY EVERY DAY
5. BEING SO RESTLESS THAT IT IS HARD TO SIT STILL: NEARLY EVERY DAY
1. FEELING NERVOUS, ANXIOUS, OR ON EDGE: NEARLY EVERY DAY
2. NOT BEING ABLE TO STOP OR CONTROL WORRYING: NEARLY EVERY DAY
4. TROUBLE RELAXING: NEARLY EVERY DAY
8. IF YOU CHECKED OFF ANY PROBLEMS, HOW DIFFICULT HAVE THESE MADE IT FOR YOU TO DO YOUR WORK, TAKE CARE OF THINGS AT HOME, OR GET ALONG WITH OTHER PEOPLE?: EXTREMELY DIFFICULT
3. WORRYING TOO MUCH ABOUT DIFFERENT THINGS: NEARLY EVERY DAY
7. FEELING AFRAID AS IF SOMETHING AWFUL MIGHT HAPPEN: NEARLY EVERY DAY
GAD7 TOTAL SCORE: 21
GAD7 TOTAL SCORE: 21
IF YOU CHECKED OFF ANY PROBLEMS ON THIS QUESTIONNAIRE, HOW DIFFICULT HAVE THESE PROBLEMS MADE IT FOR YOU TO DO YOUR WORK, TAKE CARE OF THINGS AT HOME, OR GET ALONG WITH OTHER PEOPLE: EXTREMELY DIFFICULT
7. FEELING AFRAID AS IF SOMETHING AWFUL MIGHT HAPPEN: NEARLY EVERY DAY

## 2024-08-26 ASSESSMENT — ASTHMA QUESTIONNAIRES
ACT_TOTALSCORE: 15
QUESTION_1 LAST FOUR WEEKS HOW MUCH OF THE TIME DID YOUR ASTHMA KEEP YOU FROM GETTING AS MUCH DONE AT WORK, SCHOOL OR AT HOME: SOME OF THE TIME
QUESTION_3 LAST FOUR WEEKS HOW OFTEN DID YOUR ASTHMA SYMPTOMS (WHEEZING, COUGHING, SHORTNESS OF BREATH, CHEST TIGHTNESS OR PAIN) WAKE YOU UP AT NIGHT OR EARLIER THAN USUAL IN THE MORNING: TWO OR THREE NIGHTS A WEEK
QUESTION_5 LAST FOUR WEEKS HOW WOULD YOU RATE YOUR ASTHMA CONTROL: SOMEWHAT CONTROLLED
ACT_TOTALSCORE: 15
QUESTION_4 LAST FOUR WEEKS HOW OFTEN HAVE YOU USED YOUR RESCUE INHALER OR NEBULIZER MEDICATION (SUCH AS ALBUTEROL): NOT AT ALL
QUESTION_2 LAST FOUR WEEKS HOW OFTEN HAVE YOU HAD SHORTNESS OF BREATH: ONCE A DAY

## 2024-08-26 ASSESSMENT — PATIENT HEALTH QUESTIONNAIRE - PHQ9
SUM OF ALL RESPONSES TO PHQ QUESTIONS 1-9: 26
SUM OF ALL RESPONSES TO PHQ QUESTIONS 1-9: 26
10. IF YOU CHECKED OFF ANY PROBLEMS, HOW DIFFICULT HAVE THESE PROBLEMS MADE IT FOR YOU TO DO YOUR WORK, TAKE CARE OF THINGS AT HOME, OR GET ALONG WITH OTHER PEOPLE: EXTREMELY DIFFICULT

## 2024-08-26 ASSESSMENT — PAIN SCALES - GENERAL: PAINLEVEL: SEVERE PAIN (7)

## 2024-08-26 NOTE — PROGRESS NOTES
"  Assessment & Plan     1. Acute cystitis without hematuria  Increase water intake.   - sulfamethoxazole-trimethoprim (BACTRIM DS) 800-160 MG tablet; Take 1 tablet by mouth 2 times daily for 7 days.  Dispense: 14 tablet; Refill: 0    2. Menorrhagia with irregular cycle  Continue depo injections, will consider pelvic ultrasound; declined for now.    HGB normal  - CBC with platelets and differential    3. Vaginal irritation  Normal wet prep - UTI on UA.   -- Wet prep  - UA Macroscopic with reflex to Microscopic and Culture  - Urine Microscopic Exam  - Urine Culture      BMI  Estimated body mass index is 26.54 kg/m  as calculated from the following:    Height as of this encounter: 1.6 m (5' 3\").    Weight as of this encounter: 67.9 kg (149 lb 12.8 oz).         Will notify of culture results as they are returned.     The longitudinal plan of care for the diagnosis(es)/condition(s) as documented were addressed during this visit. Due to the added complexity in care, I will continue to support Meg in the subsequent management and with ongoing continuity of care.    Tiara Hidalgo,   Certified Adult Nurse Practitioner  [phone]      Darnell Weaver is a 24 year old, presenting for the following health issues:  menstral issues        8/26/2024     9:12 AM   Additional Questions   Roomed by howard   Accompanied by none     HPI     Concern - Menstrual issues   Onset: 7/31/24  Description: having cycle lasting   Intensity: moderate  Progression of Symptoms:  same  Accompanying Signs & Symptoms: blood clots   Previous history of similar problem: no  Precipitating factors:        Worsened by: unknown   Alleviating factors:        Improved by: nothing   Therapies tried and outcome: is on depot injection thinks from that     Also notes low pelvic and low back pain/cramping.      Asthma Follow-Up    Was ACT completed today?  Yes        8/26/2024     9:13 AM   ACT Total Scores   ACT TOTAL SCORE (Goal Greater than or " "Equal to 20) 15   In the past 12 months, how many times did you visit the emergency room for your asthma without being admitted to the hospital? 0   In the past 12 months, how many times were you hospitalized overnight because of your asthma? 0         How many days per week do you miss taking your asthma controller medication?  Does miss 3 days a week  Please describe any recent triggers for your asthma: None  Have you had any Emergency Room Visits, Urgent Care Visits, or Hospital Admissions since your last office visit?  No          Recent Labs   Lab Test 11/29/23  1355 08/13/23  0325 01/19/23  1353 05/11/21  0000 03/24/21  0000 01/29/21  0000 01/19/21  1428 08/21/20  0000 06/03/20  1047 05/08/18  1027 06/22/17  2357   A1C  --   --   --   --   --   --  5.1  --   --   --   --    ALT  --  25  --  15  --  15  --    < > 24   < >  --    CR 0.66 0.48*  --  0.81   < > 0.77  --    < > 0.68   < > 0.41*   GFRESTIMATED >90 >90  --  >60   < > >60  --    < > >90   < > >90  Non  GFR Calc     GFRESTBLACK  --   --   --   --   --   --   --   --  >90  --  >90  African American GFR Calc     POTASSIUM 4.0 3.5  --  3.6   < > 3.0*  --    < > 3.6   < > 3.8   TSH  --   --  0.88  --   --  4.17* 1.46  --   --    < >  --     < > = values in this interval not displayed.      BP Readings from Last 3 Encounters:   08/26/24 110/62   07/11/24 108/68   07/05/24 106/60    Wt Readings from Last 3 Encounters:   08/26/24 67.9 kg (149 lb 12.8 oz)   07/11/24 67.5 kg (148 lb 12.8 oz)   07/05/24 67.4 kg (148 lb 11.2 oz)               Review of Systems  Constitutional, neuro, ENT, endocrine, pulmonary, cardiac, gastrointestinal, genitourinary, musculoskeletal, integument and psychiatric systems are negative, except as otherwise noted.      Objective    /62 (BP Location: Left arm, Patient Position: Chair, Cuff Size: Adult Regular)   Pulse 80   Temp 99.2  F (37.3  C) (Tympanic)   Resp 16   Ht 1.6 m (5' 3\")   Wt 67.9 kg (149 lb " 12.8 oz)   LMP 07/31/2024 (Exact Date)   SpO2 100%   BMI 26.54 kg/m    Body mass index is 26.54 kg/m .  Physical Exam   GENERAL: alert and no distress  RESP: lungs clear to auscultation - no rales, rhonchi or wheezes  CV: regular rate and rhythm, normal S1 S2, no S3 or S4, no murmur  MS: no gross musculoskeletal defects noted, no edema  PSYCH: mentation appears normal, affect normal/bright    Results for orders placed or performed in visit on 08/26/24   UA Macroscopic with reflex to Microscopic and Culture     Status: Abnormal    Specimen: Urine, Midstream   Result Value Ref Range    Color Urine Yellow Colorless, Straw, Light Yellow, Yellow    Appearance Urine Slightly Cloudy (A) Clear    Glucose Urine Negative Negative mg/dL    Bilirubin Urine Negative Negative    Ketones Urine Negative Negative mg/dL    Specific Gravity Urine 1.010 1.003 - 1.035    Blood Urine Large (A) Negative    pH Urine 6.5 5.0 - 7.0    Protein Albumin Urine Negative Negative mg/dL    Urobilinogen Urine 0.2 0.2, 1.0 E.U./dL    Nitrite Urine Negative Negative    Leukocyte Esterase Urine Moderate (A) Negative   Extra Tube     Status: None    Narrative    The following orders were created for panel order Extra Tube.  Procedure                               Abnormality         Status                     ---------                               -----------         ------                     Extra Green Top (Lithium...[[id number]]                      Final result                 Please view results for these tests on the individual orders.   CBC with platelets and differential     Status: None   Result Value Ref Range    WBC Count 8.0 4.0 - 11.0 10e3/uL    RBC Count 4.18 3.80 - 5.20 10e6/uL    Hemoglobin 12.4 11.7 - 15.7 g/dL    Hematocrit 35.8 35.0 - 47.0 %    MCV 86 78 - 100 fL    MCH 29.7 26.5 - 33.0 pg    MCHC 34.6 31.5 - 36.5 g/dL    RDW 12.8 10.0 - 15.0 %    Platelet Count 262 150 - 450 10e3/uL    % Neutrophils 61 %    % Lymphocytes 28 %     % Monocytes 7 %    % Eosinophils 3 %    % Basophils 1 %    % Immature Granulocytes 0 %    Absolute Neutrophils 4.9 1.6 - 8.3 10e3/uL    Absolute Lymphocytes 2.3 0.8 - 5.3 10e3/uL    Absolute Monocytes 0.6 0.0 - 1.3 10e3/uL    Absolute Eosinophils 0.2 0.0 - 0.7 10e3/uL    Absolute Basophils 0.0 0.0 - 0.2 10e3/uL    Absolute Immature Granulocytes 0.0 <=0.4 10e3/uL   Extra Green Top (Lithium Heparin) Tube     Status: None   Result Value Ref Range    Hold Specimen [facility]    Urine Microscopic Exam     Status: Abnormal   Result Value Ref Range    Bacteria Urine Few (A) None Seen /HPF    RBC Urine 0-2 0-2 /HPF /HPF    WBC Urine 10-25 (A) 0-5 /HPF /HPF    Squamous Epithelials Urine Moderate (A) None Seen /LPF    Transitional Epithelials Urine Few (A) None Seen /HPF   Wet prep     Status: Abnormal    Specimen: Vagina; Swab   Result Value Ref Range    Trichomonas Absent Absent    Yeast Absent Absent    Clue Cells Absent Absent    WBCs/high power field 2+ (A) None   CBC with platelets and differential     Status: None    Narrative    The following orders were created for panel order CBC with platelets and differential.  Procedure                               Abnormality         Status                     ---------                               -----------         ------                     CBC with platelets and d...[[id number]]                      Final result                 Please view results for these tests on the individual orders.             Signed Electronically by: Tiara Hidalgo NP

## 2024-08-28 LAB — BACTERIA UR CULT: NORMAL

## 2024-09-09 ENCOUNTER — PATIENT OUTREACH (OUTPATIENT)
Dept: CARE COORDINATION | Facility: OTHER | Age: 24
End: 2024-09-09

## 2024-09-09 ENCOUNTER — ALLIED HEALTH/NURSE VISIT (OUTPATIENT)
Dept: FAMILY MEDICINE | Facility: OTHER | Age: 24
End: 2024-09-09
Attending: NURSE PRACTITIONER
Payer: COMMERCIAL

## 2024-09-09 DIAGNOSIS — Z30.42 ENCOUNTER FOR SURVEILLANCE OF INJECTABLE CONTRACEPTIVE: Primary | ICD-10-CM

## 2024-09-09 PROCEDURE — 96372 THER/PROPH/DIAG INJ SC/IM: CPT | Performed by: NURSE PRACTITIONER

## 2024-09-09 PROCEDURE — 250N000011 HC RX IP 250 OP 636: Mod: JZ | Performed by: NURSE PRACTITIONER

## 2024-09-09 RX ADMIN — MEDROXYPROGESTERONE ACETATE 150 MG: 150 INJECTION, SUSPENSION INTRAMUSCULAR at 13:27

## 2024-09-09 NOTE — PROGRESS NOTES
Patient presents to clinic today for depo-provera injection.  Last injection given: 24 in left deltoid with NEXT INJECTION DUE: 24 - 10/4/24     Clinic-Administered Medication Detail     Dose Frequency Start End REGINA   medroxyPROGESTERone (DEPO-PROVERA) injection 150 mg 150 mg EVERY 3 MONTHS 2024 --   Class: Normal   Route: Intramuscular   Order: [id number]     Clinic Administered Medication Documentation      Depo Provera Documentation    Depo-Provera Standing Order inclusion/exclusion criteria reviewed.     Is this the initial or subsequent dose of Depo Provera? Subsequent dose - patient is within the acceptable window of time (11-15 weeks) for subsequent injection. Pregnancy test not indicated.    Patient meets: inclusion criteria     Is there an active order (written within the past 365 days, with administrations remaining, not ) in the chart? Yes.     Prior to injection, verified patient identity using patient's name and date of birth. Medication was administered. Please see MAR and medication order for additional information.     Vial/Syringe: Single dose vial. Was entire vial of medication used? Yes    Patient instructed to remain in clinic for 15 minutes and report any adverse reaction to staff immediately.  NEXT INJECTION DUE: 24 - 24    Verified that the patient has refills remaining in their prescription.    Date Range for next injection provided to patient.  Next appointment scheduled:  Future Appointments 2024 - 3/8/2025        Date Visit Type Length Department Provider     2024  1:30 PM MA VISIT 30 min [facility] N[facility]SE    Location Instructions:     From [place] - follow [address]nbsp; Take a right at the intersection across from [employer].&nbsp; The clinic will be on your right.  From [place] - follow [address]nbsp; Take a right onto [address].&nbsp; Take a left at the intersection across from [employer].&nbsp; The  clinic will be on your right.  From [place] - follow [address]nbsp; Take a left onto [address].&nbsp; Take a left at the intersection across from [employer].&nbsp; The clinic will be on your right.                   Chloe Maurer RN Care Coordinator  [facility]

## 2024-09-09 NOTE — PROGRESS NOTES
Clinic Care Coordination Contact  Care Team Conversations    RN CC met with patient in clinic today following depo-provera injection.    Patient states she still has not established with a new therapist or psychiatrist.  She states that she falls asleep and misses appointments due to working night shift at [employer].  She feels that working night shift is making her mental health worse, also the clients at [facility] can be rude to her as well.  Working night shift has also made it difficult for her to take her prescribed medications.  She states she has just updated her resume and is looking to make a job change that pays similar to her current job.  Discussed speaking with [employer], they can help review her resume and help with a job search. Patient is using [employer] for job search.  Provided her with [place] [employer] website to upload resume and job search as well.      RN CC provided contact number if needing any support for clinic appointments, transportation, or mental health concerns.

## 2024-10-03 ENCOUNTER — TELEPHONE (OUTPATIENT)
Dept: FAMILY MEDICINE | Facility: OTHER | Age: 24
End: 2024-10-03

## 2024-10-03 DIAGNOSIS — N92.1 MENORRHAGIA WITH IRREGULAR CYCLE: Primary | ICD-10-CM

## 2024-10-03 NOTE — TELEPHONE ENCOUNTER
11:49 AM    Reason for Call: Phone Call    Description: PT called to say that she is having continued daily bleeding since the Depo shot on 9/9/24. PT stated that some days are heavy and then spotting. PT stated that she is going through so many pads. PT is asking for a phone call back .     Was an appointment offered for this call? No      Preferred method for responding to this message: Telephone Call  What is your phone number ? [phone]    If we cannot reach you directly, may we leave a detailed response at the number you provided? Yes    Can this message wait until your PCP/provider returns, if available today? YES    Kory Ortega

## 2024-10-11 ENCOUNTER — HOSPITAL ENCOUNTER (OUTPATIENT)
Dept: ULTRASOUND IMAGING | Facility: HOSPITAL | Age: 24
Discharge: HOME | End: 2024-10-11
Attending: NURSE PRACTITIONER | Admitting: NURSE PRACTITIONER
Payer: COMMERCIAL

## 2024-10-11 DIAGNOSIS — N92.1 MENORRHAGIA WITH IRREGULAR CYCLE: ICD-10-CM

## 2024-10-11 PROCEDURE — 76856 US EXAM PELVIC COMPLETE: CPT

## 2024-10-25 ENCOUNTER — TELEPHONE (OUTPATIENT)
Dept: FAMILY MEDICINE | Facility: OTHER | Age: 24
End: 2024-10-25

## 2024-10-25 DIAGNOSIS — N92.1 MENORRHAGIA WITH IRREGULAR CYCLE: Primary | ICD-10-CM

## 2025-02-18 ENCOUNTER — TELEPHONE (OUTPATIENT)
Dept: FAMILY MEDICINE | Facility: OTHER | Age: 25
End: 2025-02-18

## 2025-02-18 DIAGNOSIS — R05.1 ACUTE COUGH: Primary | ICD-10-CM

## 2025-02-18 NOTE — TELEPHONE ENCOUNTER
Call placed to patient to discuss upcoming appointment scheduled with Alisha Santoro CNP on 2/20/25 for covid exposure. No answer, message left requesting a return call.

## 2025-02-19 NOTE — TELEPHONE ENCOUNTER
Patient returned call, discussed schedule appointment with Alisha Santoro CNP on 2/20/25 due to Covid exposure as patients family members within the home have tested positive for covid. Patient is symptomatic; symptoms to include cough, sneezing.     Update provided appointment with Alisha Santoro CNP can be changed to a Nurse Only Visit for covid testing per Alisha Santoro CNP.    Nurse Only Visit Scheduled for 2/20/25 patient may arrive at 1045 am.    A call will be returned to patient with results, if patient covid result is positive, a video visit can be scheduled for anti-viral treatment.     Patient verbalized understanding.

## 2025-02-20 ENCOUNTER — ALLIED HEALTH/NURSE VISIT (OUTPATIENT)
Dept: FAMILY MEDICINE | Facility: OTHER | Age: 25
End: 2025-02-20
Attending: NURSE PRACTITIONER
Payer: COMMERCIAL

## 2025-02-20 DIAGNOSIS — R05.1 ACUTE COUGH: ICD-10-CM

## 2025-02-20 LAB — SARS-COV-2 RNA RESP QL NAA+PROBE: NEGATIVE

## 2025-02-20 PROCEDURE — 87635 SARS-COV-2 COVID-19 AMP PRB: CPT | Mod: ZL

## 2025-02-20 NOTE — PROGRESS NOTES
Patient in to [facility] for Nurse Only Visit-Covid Testing.     Exposure to family member testing positive- resides in the same home.     Symptoms to include; cough, sneezing and sore throat.     Patient requesting work excuse. Letter has been provided to patient.

## 2025-02-20 NOTE — LETTER
February 20, 2025      Meg Diallo  [address]  [place] [address]        To Whom It May Concern:    Meg Diallo  was seen on 2/20/25.  Please excuse her 02/18/25-02/23/25 due to illness.        Sincerely,        [facility] NURSE    Electronically signed

## 2025-02-24 ENCOUNTER — TELEPHONE (OUTPATIENT)
Dept: FAMILY MEDICINE | Facility: OTHER | Age: 25
End: 2025-02-24

## 2025-02-24 NOTE — TELEPHONE ENCOUNTER
A call was returned by patient on [facility] Care Team Triage Line, RN CC returned call with no answer, message left again for patient to return call to [phone] option 6.

## 2025-02-24 NOTE — TELEPHONE ENCOUNTER
Symptom or reason needing to speak to RN: Patient called, states that she wass sent home from work. She is experiencing Heart Palpitations and dizziness     Best number to return call: [phone]     Best time to return call: ASAP

## 2025-02-24 NOTE — TELEPHONE ENCOUNTER
Call returned to patient.     Patient reports not feeling well the past few days, then stating she needs to end the call and call RN CC back.  Patient will return call to discuss.

## 2025-05-21 ENCOUNTER — TELEPHONE (OUTPATIENT)
Dept: FAMILY MEDICINE | Facility: OTHER | Age: 25
End: 2025-05-21

## 2025-05-21 NOTE — TELEPHONE ENCOUNTER
Symptom or reason needing to speak to RN: ER Follow Up / Rosa Marcano / Work Related Injury - Left Arm / 5/20/25     Best number to return call: [phone]     Best time to return call: Before 11AM (works 11-8pm)

## 2025-05-21 NOTE — TELEPHONE ENCOUNTER
Call returned to patient on 5/21/25 at 1044 am, no answer, message left requesting a return call to this writer, call back number provided on voicemail.     Date of ER/UC Visit:  5/20/25    Location: [facility]-     Reason for ER/UC Visit:  LT Wrist/Hand Injury, work comp- [employer], DOI 5/20/25    Medication Changes:  OTC Tylenol, Ibuprofen prn pain/swelling     Medication picked up/started: Yes, OTC tylenol and ibuprofen    Symptoms improved or worsened: pain continues, tylenol and ibuprofen are not helping with the pain, unable to work due to the pain. Crying during telephone conversation with this writer.     ER/UC follow up recommended:  Referral placed to Occupational Medicine for further evaluation - work comp case.   Ace Wrap and Arm Sling provided in UC  RICE  OTC Tylenol, Ibuprofen for pain/swelling       Questions/concern: OTC Tylenol and ibuprofen is not helping with pain. Crying and difficulty working due to the pain.         Appointment Scheduled: 5/30/25 at 945 am with Tiara Marcelino NP for ER Follow Up and to discuss pain and unable to work due to the pain.       Appointment scheduled with [facility] Occupational Medicine on 6/2/25.      UC follow up - LT Wrist/Hand; Work Comp-[employer]; DOI 5/20/25

## 2025-05-22 NOTE — TELEPHONE ENCOUNTER
2nd attempt to coordinate ER follow up, patient states she is unable to discuss the follow up at this time, will return call to [facility] when she is able to schedule the appointment.

## 2025-05-29 NOTE — TELEPHONE ENCOUNTER
Patient returning call on 5/29/25 for ER Follow Up. ER note from 5/21/25 updated and appointment scheduled.

## 2025-05-29 NOTE — TELEPHONE ENCOUNTER
Pt was returning phone call/ She said she should be able to answer when nurse calls back [phone]

## 2025-05-30 ENCOUNTER — RESULTS FOLLOW-UP (OUTPATIENT)
Dept: FAMILY MEDICINE | Facility: OTHER | Age: 25
End: 2025-05-30

## 2025-05-30 ENCOUNTER — ANCILLARY PROCEDURE (OUTPATIENT)
Dept: GENERAL RADIOLOGY | Facility: OTHER | Age: 25
End: 2025-05-30
Attending: NURSE PRACTITIONER
Payer: OTHER MISCELLANEOUS
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
## 2025-05-30 DIAGNOSIS — M25.532 LEFT WRIST PAIN: ICD-10-CM

## 2025-05-30 PROCEDURE — 73110 X-RAY EXAM OF WRIST: CPT | Mod: LT | Performed by: RADIOLOGY

## 2025-06-10 ENCOUNTER — TELEPHONE (OUTPATIENT)
Dept: FAMILY MEDICINE | Facility: OTHER | Age: 25
End: 2025-06-10

## 2025-06-10 NOTE — TELEPHONE ENCOUNTER
All visits, tests, imaging has to go through work comp.  She will need authorization for a second opinion.       She can go out on her own insurance for a second opinion - that would not go through work comp and her insurance/herself would be responsible for billing.

## 2025-06-10 NOTE — TELEPHONE ENCOUNTER
12:59 PM    Reason for Call: Phone Call    Description: work related injury /left shoulder and wrist / seeing another provider that said that its muscule / thinks it might rotator cuff / 5/20/25 was date of injury / being seen at , as that is where they were referred thru the  doctor. Feels she needs a second opinion. She said she told  that she is uncomfortable at work and wanted an MRI and [facility]  kind of brushed it off / still waking up and painful during sleep / can't lift arm above should level, and can't move arm behind the back.Pt would like a call back as she is unsure as what to do. Did advise her to contact  insurance to make sure that they will cover the visits here as she has started visits with another dr at [facility], she said she will do that because she doesn't want any billing issues.    Was an appointment offered for this call? No  If yes : Appointment type              Date    Preferred method for responding to this message: Telephone Call  What is your phone number ? [phone]     If we cannot reach you directly, may we leave a detailed response at the number you provided? Yes    Can this message wait until your PCP/provider returns, if available today? YES    Kory Ortega

## 2025-06-17 NOTE — PROGRESS NOTES
"  {PROVIDER CHARTING PREFERENCE:108338}    Darnell Weaver is a 25 year old, presenting for the following health issues:  UTI    History of Present Illness       Reason for visit:  Uti still?    She eats 2-3 servings of fruits and vegetables daily.She consumes 0 sweetened beverage(s) daily.She exercises with enough effort to increase her heart rate 9 or less minutes per day.  She exercises with enough effort to increase her heart rate 3 or less days per week. She is missing 3 dose(s) of medications per week.  She is not taking prescribed medications regularly due to side effects.        Genitourinary - Female  Onset/Duration: 6/4/25  Description:   Painful urination (Dysuria): YES           Frequency: YES  Blood in urine (Hematuria): No  Delay in urine (Hesitency): YES  Intensity: moderate  Progression of Symptoms:  worsening  Accompanying Signs & Symptoms:  Fever/chills: No  Flank pain: YES  Nausea and vomiting: No  Vaginal symptoms: discharge and odor  Abdominal/Pelvic Pain: YES  History:   History of frequent UTI s: YES  History of kidney stones: No  Sexually Active: YES  Possibility of pregnancy: No  Precipitating or alleviating factors: None  Therapies tried and outcome:  antibiotic from UC   6/4/25    Put on cefdinir 6/4/25    + vaginal discharge with vaginal pain.     Up to date with PAP. Due again in 2026  PAP 1/19/2023- NIL     Lab Results   Component Value Date    PAP NIL 01/19/2021           Objective    /67 (BP Location: Left arm, Patient Position: Sitting, Cuff Size: Adult Large)   Pulse 81   Temp 98  F (36.7  C) (Tympanic)   Resp 16   Ht 1.6 m (5' 3\")   Wt 54.9 kg (121 lb)   SpO2 99%   Breastfeeding No   BMI 21.43 kg/m    Body mass index is 21.43 kg/m .  Physical Exam   {Exam List (Optional):729765}    {Diagnostic Test Results (Optional):416274}        Signed Electronically by: Alisha Santoro CNP    " "No  Precipitating or alleviating factors: None  Therapies tried and outcome:  antibiotic from UC   6/4/25    Put on cefdinir 6/4/25    + vaginal discharge with vaginal pain.     Up to date with PAP. Due again in 2026  PAP 1/19/2023- NIL     Lab Results   Component Value Date    PAP NIL 01/19/2021           Objective    /67 (BP Location: Left arm, Patient Position: Sitting, Cuff Size: Adult Large)   Pulse 81   Temp 98  F (36.7  C) (Tympanic)   Resp 16   Ht 1.6 m (5' 3\")   Wt 54.9 kg (121 lb)   SpO2 99%   Breastfeeding No   BMI 21.43 kg/m    Body mass index is 21.43 kg/m .  Physical Exam   GENERAL: alert and no distress  EYES: Eyes grossly normal to inspection, PERRL and conjunctivae and sclerae normal  RESP: lungs clear to auscultation - no rales, rhonchi or wheezes  CV: regular rate and rhythm, normal S1 S2, no S3 or S4, no murmur, click or rub, no peripheral edema   BACK: no CVA tenderness, no paralumbar tenderness  PSYCH: mentation appears normal, affect normal/bright            Signed Electronically by: Alisha Santoro, CNP    "

## 2025-06-19 ENCOUNTER — OFFICE VISIT (OUTPATIENT)
Dept: FAMILY MEDICINE | Facility: OTHER | Age: 25
End: 2025-06-19
Attending: NURSE PRACTITIONER
Payer: COMMERCIAL

## 2025-06-19 ENCOUNTER — TELEPHONE (OUTPATIENT)
Dept: FAMILY MEDICINE | Facility: OTHER | Age: 25
End: 2025-06-19

## 2025-06-19 ENCOUNTER — RESULTS FOLLOW-UP (OUTPATIENT)
Dept: FAMILY MEDICINE | Facility: OTHER | Age: 25
End: 2025-06-19

## 2025-06-19 VITALS
RESPIRATION RATE: 16 BRPM | OXYGEN SATURATION: 99 % | SYSTOLIC BLOOD PRESSURE: 107 MMHG | HEIGHT: 63 IN | DIASTOLIC BLOOD PRESSURE: 67 MMHG | HEART RATE: 81 BPM | WEIGHT: 121 LBS | TEMPERATURE: 98 F | BODY MASS INDEX: 21.44 KG/M2

## 2025-06-19 DIAGNOSIS — N89.8 VAGINAL DISCHARGE: ICD-10-CM

## 2025-06-19 DIAGNOSIS — R39.9 URINARY SYMPTOM OR SIGN: Primary | ICD-10-CM

## 2025-06-19 LAB
ALBUMIN UR-MCNC: NEGATIVE MG/DL
APPEARANCE UR: ABNORMAL
BACTERIA #/AREA URNS HPF: ABNORMAL /HPF
BACTERIAL VAGINOSIS VAG-IMP: NEGATIVE
BILIRUB UR QL STRIP: NEGATIVE
C TRACH DNA SPEC QL PROBE+SIG AMP: NEGATIVE
CANDIDA DNA VAG QL NAA+PROBE: NOT DETECTED
CANDIDA GLABRATA / CANDIDA KRUSEI DNA: NOT DETECTED
COLOR UR AUTO: YELLOW
GLUCOSE UR STRIP-MCNC: NEGATIVE MG/DL
HGB UR QL STRIP: NEGATIVE
KETONES UR STRIP-MCNC: NEGATIVE MG/DL
LEUKOCYTE ESTERASE UR QL STRIP: NEGATIVE
N GONORRHOEA DNA SPEC QL NAA+PROBE: NEGATIVE
NITRATE UR QL: NEGATIVE
PH UR STRIP: 6 [PH] (ref 5–7)
RBC #/AREA URNS AUTO: ABNORMAL /HPF
SP GR UR STRIP: 1.02 (ref 1–1.03)
SPECIMEN TYPE: NORMAL
SQUAMOUS #/AREA URNS AUTO: ABNORMAL /LPF
T VAGINALIS DNA VAG QL NAA+PROBE: NOT DETECTED
UROBILINOGEN UR STRIP-ACNC: 0.2 E.U./DL
WBC #/AREA URNS AUTO: ABNORMAL /HPF

## 2025-06-19 PROCEDURE — 81001 URINALYSIS AUTO W/SCOPE: CPT | Mod: ZL | Performed by: NURSE PRACTITIONER

## 2025-06-19 PROCEDURE — 87491 CHLMYD TRACH DNA AMP PROBE: CPT | Mod: ZL | Performed by: NURSE PRACTITIONER

## 2025-06-19 PROCEDURE — 81003 URINALYSIS AUTO W/O SCOPE: CPT | Mod: ZL | Performed by: NURSE PRACTITIONER

## 2025-06-19 PROCEDURE — 81515 NFCT DS BV&VAGINITIS DNA ALG: CPT | Mod: ZL | Performed by: NURSE PRACTITIONER

## 2025-06-19 PROCEDURE — G0463 HOSPITAL OUTPT CLINIC VISIT: HCPCS

## 2025-06-19 ASSESSMENT — ASTHMA QUESTIONNAIRES
ACT_TOTALSCORE: 18
QUESTION_3 LAST FOUR WEEKS HOW OFTEN DID YOUR ASTHMA SYMPTOMS (WHEEZING, COUGHING, SHORTNESS OF BREATH, CHEST TIGHTNESS OR PAIN) WAKE YOU UP AT NIGHT OR EARLIER THAN USUAL IN THE MORNING: NOT AT ALL
QUESTION_4 LAST FOUR WEEKS HOW OFTEN HAVE YOU USED YOUR RESCUE INHALER OR NEBULIZER MEDICATION (SUCH AS ALBUTEROL): NOT AT ALL
QUESTION_2 LAST FOUR WEEKS HOW OFTEN HAVE YOU HAD SHORTNESS OF BREATH: THREE TO SIX TIMES A WEEK
QUESTION_1 LAST FOUR WEEKS HOW MUCH OF THE TIME DID YOUR ASTHMA KEEP YOU FROM GETTING AS MUCH DONE AT WORK, SCHOOL OR AT HOME: MOST OF THE TIME
QUESTION_5 LAST FOUR WEEKS HOW WOULD YOU RATE YOUR ASTHMA CONTROL: SOMEWHAT CONTROLLED

## 2025-06-19 ASSESSMENT — PAIN SCALES - GENERAL: PAINLEVEL_OUTOF10: NO PAIN (0)

## 2025-06-19 NOTE — TELEPHONE ENCOUNTER
"Results requested: Labs from today  Please note: Patient is seeing \"abnormal results\" on her MyChart - she did see the Results follow Up message from [facility] but is confused with MyChart also saying \"abnormal\" within results    Best number to reach patient at: [phone]     Best time to call patient: ASAP    Send to care team in basket.   "

## 2025-06-24 NOTE — TELEPHONE ENCOUNTER
"Urine had some skin cells (that carry bacteria) from 6/19. That was the only thing \"abnormal\".  Poor collection- contaminated.  No infection.  Has since gone to  at [facility]. No follow up needed .  Alisha Santoro, APRN, CNP    "

## (undated) DEVICE — SUCTION-IRRIGATION STRYKEFLOW II (STRYKER)

## (undated) DEVICE — CANISTER-SUCTION 2000CC

## (undated) DEVICE — TROCAR SLEEVE-KII 5X100MM

## (undated) DEVICE — TUBING-SEECLEAR LAPAROSCOPIC SMOKE EVACUATION

## (undated) DEVICE — SPATULA TIP FOR SUCTION IRRIGATION PROBE

## (undated) DEVICE — COVER-MAYO STAND 23" X 55"

## (undated) DEVICE — Device

## (undated) DEVICE — APPLICATOR-CHLORAPREP 26ML TINTED CHG 2%+ 70% IPA-SURGICAL

## (undated) DEVICE — LABEL-STERILE PREPRINTED FOR OR

## (undated) DEVICE — CAUTERY PAD-POLYHESIVE II ADULT

## (undated) DEVICE — TUBING-INSUFFLATION/LAPAROFLATOR W/FILTER

## (undated) DEVICE — MARKER-SKIN REG

## (undated) DEVICE — TROCAR-11X100MM BLADED W/FIXATION

## (undated) DEVICE — UTERINE MANIPULATOR-KRONNER MANIPUJECTOR

## (undated) DEVICE — PUNCTURE CLOSURE DEVICE

## (undated) DEVICE — TRAY-SKIN PREP POVIDONE/IODINE

## (undated) DEVICE — DRSG-SPONGE X-RAY 4 X 4

## (undated) DEVICE — SUTURE-VICRYL 0 UR-6 J603H

## (undated) DEVICE — NDL-INSUFFLATION 120MM

## (undated) DEVICE — GLV-8.5 BIOGEL LATEX

## (undated) DEVICE — PACK-LAP LAVH-CUSTOM

## (undated) DEVICE — GLV-7.0 PROTEXIS PI CLASSIC LF/PF

## (undated) DEVICE — INZII RETRIEVAL SYSTEM-10MM

## (undated) DEVICE — SOL-NACL 0.9% 1000ML

## (undated) DEVICE — LIGHT HANDLE COVER

## (undated) DEVICE — TROCAR-5X100MM BLADED W/FIXATION

## (undated) DEVICE — SPONGE-LAPAROTOMY PADS 18 X 18

## (undated) DEVICE — IRRIGATION-NACL 1000ML

## (undated) DEVICE — CATH-URETHRAL 14FR

## (undated) DEVICE — IRRIGATION-H2O 1000ML

## (undated) DEVICE — SUTURE-MONOCRYL 4-0 PS-2 Y496G

## (undated) DEVICE — CORD-LAPAROSCOPIC MONOPOLAR-DISPOSABLE

## (undated) DEVICE — LIGASURE-5MM BLUNT TIP LAPAROSCOPIC

## (undated) DEVICE — SCD SLEEVE-THIGH REG.

## (undated) DEVICE — SYRINGE-20CC LUER LOCK

## (undated) RX ORDER — GLYCOPYRROLATE 0.2 MG/ML
INJECTION, SOLUTION INTRAMUSCULAR; INTRAVENOUS
Status: DISPENSED
Start: 2019-08-22

## (undated) RX ORDER — FENTANYL CITRATE 50 UG/ML
INJECTION, SOLUTION INTRAMUSCULAR; INTRAVENOUS
Status: DISPENSED
Start: 2019-08-22

## (undated) RX ORDER — ONDANSETRON 2 MG/ML
INJECTION INTRAMUSCULAR; INTRAVENOUS
Status: DISPENSED
Start: 2019-08-22

## (undated) RX ORDER — DEXAMETHASONE SODIUM PHOSPHATE 10 MG/ML
INJECTION, SOLUTION INTRAMUSCULAR; INTRAVENOUS
Status: DISPENSED
Start: 2019-08-22

## (undated) RX ORDER — PROPOFOL 10 MG/ML
INJECTION, EMULSION INTRAVENOUS
Status: DISPENSED
Start: 2019-08-22

## (undated) RX ORDER — MEDROXYPROGESTERONE ACETATE 150 MG/ML
INJECTION, SUSPENSION INTRAMUSCULAR
Status: DISPENSED
Start: 2024-09-09

## (undated) RX ORDER — LIDOCAINE HYDROCHLORIDE 20 MG/ML
INJECTION, SOLUTION EPIDURAL; INFILTRATION; INTRACAUDAL; PERINEURAL
Status: DISPENSED
Start: 2019-08-22

## (undated) RX ORDER — NEOSTIGMINE METHYLSULFATE 1 MG/ML
VIAL (ML) INJECTION
Status: DISPENSED
Start: 2019-08-22

## (undated) RX ORDER — KETOROLAC TROMETHAMINE 30 MG/ML
INJECTION, SOLUTION INTRAMUSCULAR; INTRAVENOUS
Status: DISPENSED
Start: 2019-08-22